# Patient Record
Sex: MALE | Race: WHITE | Employment: OTHER | URBAN - METROPOLITAN AREA
[De-identification: names, ages, dates, MRNs, and addresses within clinical notes are randomized per-mention and may not be internally consistent; named-entity substitution may affect disease eponyms.]

---

## 2017-03-05 PROBLEM — R06.09 EXERTIONAL DYSPNEA: Status: ACTIVE | Noted: 2017-03-05

## 2017-03-05 PROBLEM — G47.33 OBSTRUCTIVE SLEEP APNEA: Status: ACTIVE | Noted: 2017-03-05

## 2017-03-05 PROBLEM — E66.01 MORBID OBESITY DUE TO EXCESS CALORIES (HCC): Status: ACTIVE | Noted: 2017-03-05

## 2017-03-06 PROBLEM — E78.00 PURE HYPERCHOLESTEROLEMIA: Status: ACTIVE | Noted: 2017-03-06

## 2017-03-06 PROBLEM — N18.2 CHRONIC KIDNEY DISEASE, STAGE II (MILD): Status: ACTIVE | Noted: 2017-03-06

## 2017-03-08 LAB
LEFT VENTRICULAR EJECTION FRACTION HIGH VALUE: 60 %
LEFT VENTRICULAR EJECTION FRACTION MODE: NORMAL
LV EF: 55 %

## 2017-05-04 PROBLEM — M79.602 LEFT ARM PAIN: Status: ACTIVE | Noted: 2017-05-04

## 2017-05-05 PROBLEM — I10 ACCELERATED HYPERTENSION: Status: ACTIVE | Noted: 2017-05-05

## 2018-03-12 ENCOUNTER — TELEPHONE (OUTPATIENT)
Dept: CARDIOLOGY CLINIC | Age: 53
End: 2018-03-12

## 2018-03-12 NOTE — TELEPHONE ENCOUNTER
Yuniel Daisy 8/09/2095 [facility] [phone] (home)    Called and cancelled tomorrows initial visit. Has to take his mother to an appt that she can't miss. Cant come next Tuesday.   Agreeable to Friday 3/23 @ 2\"30pm

## 2018-03-19 DIAGNOSIS — R06.09 EXERTIONAL DYSPNEA: Primary | ICD-10-CM

## 2018-03-23 ENCOUNTER — OFFICE VISIT (OUTPATIENT)
Dept: CARDIOLOGY CLINIC | Age: 53
End: 2018-03-23

## 2018-03-23 VITALS
HEIGHT: 72 IN | WEIGHT: 315 LBS | DIASTOLIC BLOOD PRESSURE: 108 MMHG | OXYGEN SATURATION: 93 % | BODY MASS INDEX: 42.66 KG/M2 | HEART RATE: 78 BPM | SYSTOLIC BLOOD PRESSURE: 170 MMHG | RESPIRATION RATE: 20 BRPM

## 2018-03-23 DIAGNOSIS — I50.30 (HFPEF) HEART FAILURE WITH PRESERVED EJECTION FRACTION (HCC): Primary | ICD-10-CM

## 2018-03-23 DIAGNOSIS — J98.4 RESTRICTIVE LUNG DISEASE: ICD-10-CM

## 2018-03-23 DIAGNOSIS — R06.02 SOB (SHORTNESS OF BREATH): ICD-10-CM

## 2018-03-23 DIAGNOSIS — G47.33 OSA (OBSTRUCTIVE SLEEP APNEA): ICD-10-CM

## 2018-03-23 DIAGNOSIS — I10 ESSENTIAL HYPERTENSION: ICD-10-CM

## 2018-03-23 DIAGNOSIS — E66.9 OBESITY (BMI 35.0-39.9 WITHOUT COMORBIDITY): ICD-10-CM

## 2018-03-23 PROCEDURE — 99205 OFFICE O/P NEW HI 60 MIN: CPT | Performed by: INTERNAL MEDICINE

## 2018-03-23 PROCEDURE — 93000 ELECTROCARDIOGRAM COMPLETE: CPT | Performed by: INTERNAL MEDICINE

## 2018-03-23 RX ORDER — AMITRIPTYLINE HYDROCHLORIDE 25 MG/1
25 TABLET, FILM COATED ORAL NIGHTLY PRN
Qty: 90 TABLET | Refills: 3 | Status: SHIPPED | OUTPATIENT
Start: 2018-03-23 | End: 2018-09-17

## 2018-03-23 RX ORDER — POTASSIUM CHLORIDE 20 MEQ/1
20 TABLET, EXTENDED RELEASE ORAL DAILY
Qty: 60 TABLET | Refills: 3 | Status: SHIPPED | OUTPATIENT
Start: 2018-03-23 | End: 2018-07-26 | Stop reason: ALTCHOICE

## 2018-03-23 RX ORDER — NICOTINE 21 MG/24HR
1 PATCH, TRANSDERMAL 24 HOURS TRANSDERMAL EVERY 24 HOURS
Qty: 60 PATCH | Refills: 3 | Status: SHIPPED | OUTPATIENT
Start: 2018-03-23 | End: 2018-07-10

## 2018-03-23 RX ORDER — BUMETANIDE 2 MG/1
2 TABLET ORAL DAILY
Qty: 60 TABLET | Refills: 3 | Status: SHIPPED | OUTPATIENT
Start: 2018-03-23 | End: 2018-07-26 | Stop reason: ALTCHOICE

## 2018-03-23 RX ORDER — LOSARTAN POTASSIUM 25 MG/1
25 TABLET ORAL DAILY
Qty: 30 TABLET | Refills: 3 | Status: SHIPPED | OUTPATIENT
Start: 2018-03-23 | End: 2018-04-23 | Stop reason: SDUPTHER

## 2018-04-04 ENCOUNTER — HOSPITAL ENCOUNTER (OUTPATIENT)
Age: 53
Discharge: HOME | End: 2018-04-04
Payer: MEDICAID

## 2018-04-04 ENCOUNTER — OFFICE VISIT (OUTPATIENT)
Dept: CARDIOLOGY CLINIC | Age: 53
End: 2018-04-04
Payer: MEDICAID

## 2018-04-04 VITALS
OXYGEN SATURATION: 95 % | HEART RATE: 94 BPM | WEIGHT: 315 LBS | RESPIRATION RATE: 20 BRPM | BODY MASS INDEX: 42.66 KG/M2 | HEIGHT: 72 IN | DIASTOLIC BLOOD PRESSURE: 80 MMHG | SYSTOLIC BLOOD PRESSURE: 138 MMHG

## 2018-04-04 DIAGNOSIS — G47.33 OSA (OBSTRUCTIVE SLEEP APNEA): ICD-10-CM

## 2018-04-04 DIAGNOSIS — I50.30 (HFPEF) HEART FAILURE WITH PRESERVED EJECTION FRACTION (HCC): ICD-10-CM

## 2018-04-04 DIAGNOSIS — I10 ESSENTIAL HYPERTENSION: ICD-10-CM

## 2018-04-04 DIAGNOSIS — E66.9 OBESITY (BMI 35.0-39.9 WITHOUT COMORBIDITY): ICD-10-CM

## 2018-04-04 DIAGNOSIS — J98.4 RESTRICTIVE LUNG DISEASE: ICD-10-CM

## 2018-04-04 DIAGNOSIS — I25.118 ATHEROSCLEROSIS OF NATIVE CORONARY ARTERY OF NATIVE HEART WITH STABLE ANGINA PECTORIS (HCC): Primary | ICD-10-CM

## 2018-04-04 DIAGNOSIS — R06.02 SOB (SHORTNESS OF BREATH): ICD-10-CM

## 2018-04-04 LAB — PRO-BNP: 245 PG/ML (ref 0–125)

## 2018-04-04 PROCEDURE — G8598 ASA/ANTIPLAT THER USED: HCPCS | Performed by: INTERNAL MEDICINE

## 2018-04-04 PROCEDURE — 99214 OFFICE O/P EST MOD 30 MIN: CPT | Performed by: INTERNAL MEDICINE

## 2018-04-04 PROCEDURE — 4004F PT TOBACCO SCREEN RCVD TLK: CPT | Performed by: INTERNAL MEDICINE

## 2018-04-04 PROCEDURE — G8427 DOCREV CUR MEDS BY ELIG CLIN: HCPCS | Performed by: INTERNAL MEDICINE

## 2018-04-04 PROCEDURE — 3017F COLORECTAL CA SCREEN DOC REV: CPT | Performed by: INTERNAL MEDICINE

## 2018-04-04 PROCEDURE — G8417 CALC BMI ABV UP PARAM F/U: HCPCS | Performed by: INTERNAL MEDICINE

## 2018-04-04 PROCEDURE — 36415 COLL VENOUS BLD VENIPUNCTURE: CPT

## 2018-04-04 PROCEDURE — 93000 ELECTROCARDIOGRAM COMPLETE: CPT | Performed by: INTERNAL MEDICINE

## 2018-04-04 PROCEDURE — 83880 ASSAY OF NATRIURETIC PEPTIDE: CPT

## 2018-04-06 ENCOUNTER — OFFICE VISIT (OUTPATIENT)
Dept: PULMONOLOGY | Age: 53
End: 2018-04-06
Payer: MEDICAID

## 2018-04-06 VITALS
SYSTOLIC BLOOD PRESSURE: 180 MMHG | DIASTOLIC BLOOD PRESSURE: 122 MMHG | WEIGHT: 315 LBS | RESPIRATION RATE: 20 BRPM | BODY MASS INDEX: 42.66 KG/M2 | HEIGHT: 72 IN | OXYGEN SATURATION: 96 %

## 2018-04-06 DIAGNOSIS — G47.33 OBSTRUCTIVE SLEEP APNEA: ICD-10-CM

## 2018-04-06 PROCEDURE — 99213 OFFICE O/P EST LOW 20 MIN: CPT | Performed by: INTERNAL MEDICINE

## 2018-04-06 PROCEDURE — G8427 DOCREV CUR MEDS BY ELIG CLIN: HCPCS | Performed by: INTERNAL MEDICINE

## 2018-04-06 PROCEDURE — 3017F COLORECTAL CA SCREEN DOC REV: CPT | Performed by: INTERNAL MEDICINE

## 2018-04-06 PROCEDURE — 99214 OFFICE O/P EST MOD 30 MIN: CPT | Performed by: INTERNAL MEDICINE

## 2018-04-06 PROCEDURE — G8598 ASA/ANTIPLAT THER USED: HCPCS | Performed by: INTERNAL MEDICINE

## 2018-04-06 PROCEDURE — G8417 CALC BMI ABV UP PARAM F/U: HCPCS | Performed by: INTERNAL MEDICINE

## 2018-04-06 PROCEDURE — 4004F PT TOBACCO SCREEN RCVD TLK: CPT | Performed by: INTERNAL MEDICINE

## 2018-04-09 ENCOUNTER — HOSPITAL ENCOUNTER (OUTPATIENT)
Dept: CARDIOLOGY | Age: 53
Discharge: HOME | End: 2018-04-09
Payer: MEDICAID

## 2018-04-09 DIAGNOSIS — E66.9 OBESITY (BMI 35.0-39.9 WITHOUT COMORBIDITY): ICD-10-CM

## 2018-04-09 DIAGNOSIS — G47.33 OSA (OBSTRUCTIVE SLEEP APNEA): ICD-10-CM

## 2018-04-09 DIAGNOSIS — I10 ESSENTIAL HYPERTENSION: ICD-10-CM

## 2018-04-09 DIAGNOSIS — R06.02 SOB (SHORTNESS OF BREATH): ICD-10-CM

## 2018-04-09 DIAGNOSIS — I50.30 (HFPEF) HEART FAILURE WITH PRESERVED EJECTION FRACTION (HCC): ICD-10-CM

## 2018-04-09 DIAGNOSIS — J98.4 RESTRICTIVE LUNG DISEASE: ICD-10-CM

## 2018-04-09 LAB
LV EF: 56 %
LVEF MODALITY: NORMAL

## 2018-04-09 PROCEDURE — 93306 TTE W/DOPPLER COMPLETE: CPT

## 2018-04-12 ENCOUNTER — HOSPITAL ENCOUNTER (OUTPATIENT)
Dept: CARDIAC CATH/INVASIVE PROCEDURES | Age: 53
Discharge: HOME | End: 2018-04-12
Payer: MEDICAID

## 2018-04-12 VITALS
DIASTOLIC BLOOD PRESSURE: 109 MMHG | RESPIRATION RATE: 24 BRPM | TEMPERATURE: 98 F | BODY MASS INDEX: 42.66 KG/M2 | HEIGHT: 72 IN | WEIGHT: 315 LBS | HEART RATE: 86 BPM | SYSTOLIC BLOOD PRESSURE: 176 MMHG

## 2018-04-12 LAB
ANION GAP SERPL CALCULATED.3IONS-SCNC: 17 MMOL/L (ref 7–16)
BASOPHILS ABSOLUTE: 0.04 E9/L (ref 0–0.2)
BASOPHILS RELATIVE PERCENT: 0.4 % (ref 0–2)
BUN BLDV-MCNC: 21 MG/DL (ref 6–20)
CALCIUM SERPL-MCNC: 9.4 MG/DL (ref 8.6–10.2)
CHLORIDE BLD-SCNC: 103 MMOL/L (ref 98–107)
CO2: 24 MMOL/L (ref 22–29)
CREAT SERPL-MCNC: 1.1 MG/DL (ref 0.7–1.2)
EOSINOPHILS ABSOLUTE: 0.17 E9/L (ref 0.05–0.5)
EOSINOPHILS RELATIVE PERCENT: 1.9 % (ref 0–6)
GFR AFRICAN AMERICAN: >60
GFR NON-AFRICAN AMERICAN: >60 ML/MIN/1.73
GLUCOSE BLD-MCNC: 119 MG/DL (ref 74–109)
HCT VFR BLD CALC: 46.5 % (ref 37–54)
HEMOGLOBIN: 15.7 G/DL (ref 12.5–16.5)
IMMATURE GRANULOCYTES #: 0.06 E9/L
IMMATURE GRANULOCYTES %: 0.7 % (ref 0–5)
LYMPHOCYTES ABSOLUTE: 1.82 E9/L (ref 1.5–4)
LYMPHOCYTES RELATIVE PERCENT: 19.8 % (ref 20–42)
MCH RBC QN AUTO: 31.1 PG (ref 26–35)
MCHC RBC AUTO-ENTMCNC: 33.8 % (ref 32–34.5)
MCV RBC AUTO: 92.1 FL (ref 80–99.9)
MONOCYTES ABSOLUTE: 0.79 E9/L (ref 0.1–0.95)
MONOCYTES RELATIVE PERCENT: 8.6 % (ref 2–12)
NEUTROPHILS ABSOLUTE: 6.3 E9/L (ref 1.8–7.3)
NEUTROPHILS RELATIVE PERCENT: 68.6 % (ref 43–80)
PDW BLD-RTO: 13.7 FL (ref 11.5–15)
PLATELET # BLD: 221 E9/L (ref 130–450)
PMV BLD AUTO: 9.1 FL (ref 7–12)
POTASSIUM SERPL-SCNC: 4.4 MMOL/L (ref 3.5–5)
RBC # BLD: 5.05 E12/L (ref 3.8–5.8)
SODIUM BLD-SCNC: 144 MMOL/L (ref 132–146)
WBC # BLD: 9.2 E9/L (ref 4.5–11.5)

## 2018-04-12 PROCEDURE — 80048 BASIC METABOLIC PNL TOTAL CA: CPT

## 2018-04-12 PROCEDURE — 2500000003 HC RX 250 WO HCPCS

## 2018-04-12 PROCEDURE — 2709999900 HC NON-CHARGEABLE SUPPLY

## 2018-04-12 PROCEDURE — C1751 CATH, INF, PER/CENT/MIDLINE: HCPCS

## 2018-04-12 PROCEDURE — C1894 INTRO/SHEATH, NON-LASER: HCPCS

## 2018-04-12 PROCEDURE — 93451 RIGHT HEART CATH: CPT | Performed by: INTERNAL MEDICINE

## 2018-04-12 PROCEDURE — 6360000002 HC RX W HCPCS

## 2018-04-12 PROCEDURE — 36415 COLL VENOUS BLD VENIPUNCTURE: CPT

## 2018-04-12 PROCEDURE — 85025 COMPLETE CBC W/AUTO DIFF WBC: CPT

## 2018-04-23 ENCOUNTER — OFFICE VISIT (OUTPATIENT)
Dept: CARDIOLOGY CLINIC | Age: 53
End: 2018-04-23
Payer: MEDICAID

## 2018-04-23 VITALS
OXYGEN SATURATION: 98 % | WEIGHT: 315 LBS | BODY MASS INDEX: 42.66 KG/M2 | RESPIRATION RATE: 24 BRPM | DIASTOLIC BLOOD PRESSURE: 88 MMHG | HEART RATE: 86 BPM | SYSTOLIC BLOOD PRESSURE: 150 MMHG | HEIGHT: 72 IN

## 2018-04-23 DIAGNOSIS — I50.30 (HFPEF) HEART FAILURE WITH PRESERVED EJECTION FRACTION (HCC): ICD-10-CM

## 2018-04-23 DIAGNOSIS — R20.0 ARM NUMBNESS: ICD-10-CM

## 2018-04-23 DIAGNOSIS — R07.9 CHEST PAIN, UNSPECIFIED TYPE: Primary | ICD-10-CM

## 2018-04-23 DIAGNOSIS — R42 DIZZINESS: ICD-10-CM

## 2018-04-23 DIAGNOSIS — R06.00 DYSPNEA, UNSPECIFIED TYPE: ICD-10-CM

## 2018-04-23 DIAGNOSIS — R26.89 IMBALANCE: ICD-10-CM

## 2018-04-23 DIAGNOSIS — R27.0 ATAXIA: ICD-10-CM

## 2018-04-23 PROCEDURE — G8417 CALC BMI ABV UP PARAM F/U: HCPCS | Performed by: INTERNAL MEDICINE

## 2018-04-23 PROCEDURE — 4004F PT TOBACCO SCREEN RCVD TLK: CPT | Performed by: INTERNAL MEDICINE

## 2018-04-23 PROCEDURE — 93000 ELECTROCARDIOGRAM COMPLETE: CPT | Performed by: INTERNAL MEDICINE

## 2018-04-23 PROCEDURE — 99215 OFFICE O/P EST HI 40 MIN: CPT | Performed by: INTERNAL MEDICINE

## 2018-04-23 PROCEDURE — 3017F COLORECTAL CA SCREEN DOC REV: CPT | Performed by: INTERNAL MEDICINE

## 2018-04-23 PROCEDURE — 99406 BEHAV CHNG SMOKING 3-10 MIN: CPT | Performed by: INTERNAL MEDICINE

## 2018-04-23 PROCEDURE — G8598 ASA/ANTIPLAT THER USED: HCPCS | Performed by: INTERNAL MEDICINE

## 2018-04-23 PROCEDURE — G8427 DOCREV CUR MEDS BY ELIG CLIN: HCPCS | Performed by: INTERNAL MEDICINE

## 2018-04-23 RX ORDER — LOSARTAN POTASSIUM 50 MG/1
50 TABLET ORAL NIGHTLY
Qty: 90 TABLET | Refills: 3 | Status: SHIPPED | OUTPATIENT
Start: 2018-04-23 | End: 2018-07-10 | Stop reason: SDUPTHER

## 2018-05-10 ENCOUNTER — TELEPHONE (OUTPATIENT)
Dept: CARDIOLOGY CLINIC | Age: 53
End: 2018-05-10

## 2018-06-25 ENCOUNTER — APPOINTMENT (OUTPATIENT)
Dept: GENERAL RADIOLOGY | Age: 53
End: 2018-06-25
Payer: MEDICAID

## 2018-06-25 ENCOUNTER — APPOINTMENT (OUTPATIENT)
Dept: CT IMAGING | Age: 53
End: 2018-06-25
Payer: MEDICAID

## 2018-06-25 ENCOUNTER — HOSPITAL ENCOUNTER (OUTPATIENT)
Age: 53
Setting detail: OBSERVATION
Discharge: HOME | End: 2018-06-28
Attending: EMERGENCY MEDICINE | Admitting: INTERNAL MEDICINE
Payer: MEDICAID

## 2018-06-25 DIAGNOSIS — I15.0 RENOVASCULAR HYPERTENSION: ICD-10-CM

## 2018-06-25 DIAGNOSIS — R42 LIGHTHEADED: ICD-10-CM

## 2018-06-25 DIAGNOSIS — G45.9 TRANSIENT CEREBRAL ISCHEMIA, UNSPECIFIED TYPE: Primary | ICD-10-CM

## 2018-06-25 DIAGNOSIS — I10 HYPERTENSION, UNSPECIFIED TYPE: ICD-10-CM

## 2018-06-25 LAB
ANION GAP SERPL CALCULATED.3IONS-SCNC: 13 MMOL/L (ref 7–16)
BUN BLDV-MCNC: 14 MG/DL (ref 6–20)
CALCIUM SERPL-MCNC: 9.1 MG/DL (ref 8.6–10.2)
CHLORIDE BLD-SCNC: 103 MMOL/L (ref 98–107)
CO2: 24 MMOL/L (ref 22–29)
CREAT SERPL-MCNC: 1 MG/DL (ref 0.7–1.2)
GFR AFRICAN AMERICAN: >60
GFR NON-AFRICAN AMERICAN: >60 ML/MIN/1.73
GLUCOSE BLD-MCNC: 156 MG/DL (ref 74–109)
HCT VFR BLD CALC: 45 % (ref 37–54)
HEMOGLOBIN: 15.3 G/DL (ref 12.5–16.5)
MCH RBC QN AUTO: 31.7 PG (ref 26–35)
MCHC RBC AUTO-ENTMCNC: 34 % (ref 32–34.5)
MCV RBC AUTO: 93.4 FL (ref 80–99.9)
PDW BLD-RTO: 13.3 FL (ref 11.5–15)
PLATELET # BLD: 178 E9/L (ref 130–450)
PMV BLD AUTO: 9.2 FL (ref 7–12)
POTASSIUM SERPL-SCNC: 3.8 MMOL/L (ref 3.5–5)
PRO-BNP: 281 PG/ML (ref 0–125)
RBC # BLD: 4.82 E12/L (ref 3.8–5.8)
SODIUM BLD-SCNC: 140 MMOL/L (ref 132–146)
TROPONIN: <0.01 NG/ML (ref 0–0.03)
WBC # BLD: 6.7 E9/L (ref 4.5–11.5)

## 2018-06-25 PROCEDURE — 85027 COMPLETE CBC AUTOMATED: CPT

## 2018-06-25 PROCEDURE — 36415 COLL VENOUS BLD VENIPUNCTURE: CPT

## 2018-06-25 PROCEDURE — 83880 ASSAY OF NATRIURETIC PEPTIDE: CPT

## 2018-06-25 PROCEDURE — 70450 CT HEAD/BRAIN W/O DYE: CPT

## 2018-06-25 PROCEDURE — 96374 THER/PROPH/DIAG INJ IV PUSH: CPT

## 2018-06-25 PROCEDURE — 80048 BASIC METABOLIC PNL TOTAL CA: CPT

## 2018-06-25 PROCEDURE — 2500000003 HC RX 250 WO HCPCS: Performed by: EMERGENCY MEDICINE

## 2018-06-25 PROCEDURE — 96375 TX/PRO/DX INJ NEW DRUG ADDON: CPT

## 2018-06-25 PROCEDURE — 71046 X-RAY EXAM CHEST 2 VIEWS: CPT

## 2018-06-25 PROCEDURE — 6360000002 HC RX W HCPCS: Performed by: EMERGENCY MEDICINE

## 2018-06-25 PROCEDURE — 99285 EMERGENCY DEPT VISIT HI MDM: CPT

## 2018-06-25 PROCEDURE — 84484 ASSAY OF TROPONIN QUANT: CPT

## 2018-06-25 PROCEDURE — G0378 HOSPITAL OBSERVATION PER HR: HCPCS

## 2018-06-25 RX ORDER — HYDRALAZINE HYDROCHLORIDE 20 MG/ML
10 INJECTION INTRAMUSCULAR; INTRAVENOUS ONCE
Status: COMPLETED | OUTPATIENT
Start: 2018-06-25 | End: 2018-06-25

## 2018-06-25 RX ORDER — MORPHINE SULFATE 4 MG/ML
4 INJECTION, SOLUTION INTRAMUSCULAR; INTRAVENOUS ONCE
Status: COMPLETED | OUTPATIENT
Start: 2018-06-25 | End: 2018-06-25

## 2018-06-25 RX ORDER — ONDANSETRON 2 MG/ML
4 INJECTION INTRAMUSCULAR; INTRAVENOUS ONCE
Status: COMPLETED | OUTPATIENT
Start: 2018-06-25 | End: 2018-06-25

## 2018-06-25 RX ORDER — LABETALOL HYDROCHLORIDE 5 MG/ML
20 INJECTION, SOLUTION INTRAVENOUS ONCE
Status: COMPLETED | OUTPATIENT
Start: 2018-06-25 | End: 2018-06-25

## 2018-06-25 RX ADMIN — MORPHINE SULFATE 4 MG: 4 INJECTION INTRAVENOUS at 20:11

## 2018-06-25 RX ADMIN — LABETALOL HYDROCHLORIDE 20 MG: 5 INJECTION INTRAVENOUS at 18:05

## 2018-06-25 RX ADMIN — ONDANSETRON 4 MG: 2 INJECTION INTRAMUSCULAR; INTRAVENOUS at 20:11

## 2018-06-25 RX ADMIN — HYDRALAZINE HYDROCHLORIDE 10 MG: 20 INJECTION INTRAMUSCULAR; INTRAVENOUS at 22:24

## 2018-06-25 ASSESSMENT — PAIN SCALES - GENERAL
PAINLEVEL_OUTOF10: 10
PAINLEVEL_OUTOF10: 6

## 2018-06-25 ASSESSMENT — PAIN DESCRIPTION - LOCATION: LOCATION: BACK;HEAD

## 2018-06-25 ASSESSMENT — PAIN DESCRIPTION - PAIN TYPE: TYPE: CHRONIC PAIN;ACUTE PAIN

## 2018-06-25 NOTE — ED PROVIDER NOTES
Heart Disease in his mother; No Known Problems in his brother, brother, sister, and sister; Other in his father. The patients home medications have been reviewed.     Allergies: Pcn [penicillins]    -------------------------------------------------- RESULTS -------------------------------------------------  All laboratory and radiology results have been personally reviewed by myself   LABS:  Results for orders placed or performed during the hospital encounter of 66/24/61   Basic metabolic panel   Result Value Ref Range    Sodium 140 132 - 146 mmol/L    Potassium 3.8 3.5 - 5.0 mmol/L    Chloride 103 98 - 107 mmol/L    CO2 24 22 - 29 mmol/L    Anion Gap 13 7 - 16 mmol/L    Glucose 156 (H) 74 - 109 mg/dL    BUN 14 6 - 20 mg/dL    CREATININE 1.0 0.7 - 1.2 mg/dL    GFR Non-African American >60 >=60 mL/min/1.73    GFR African American >60     Calcium 9.1 8.6 - 10.2 mg/dL   CBC   Result Value Ref Range    WBC 6.7 4.5 - 11.5 E9/L    RBC 4.82 3.80 - 5.80 E12/L    Hemoglobin 15.3 12.5 - 16.5 g/dL    Hematocrit 45.0 37.0 - 54.0 %    MCV 93.4 80.0 - 99.9 fL    MCH 31.7 26.0 - 35.0 pg    MCHC 34.0 32.0 - 34.5 %    RDW 13.3 11.5 - 15.0 fL    Platelets 005 000 - 390 E9/L    MPV 9.2 7.0 - 12.0 fL   Troponin   Result Value Ref Range    Troponin <0.01 0.00 - 0.03 ng/mL   Brain Natriuretic Peptide   Result Value Ref Range    Pro- (H) 0 - 125 pg/mL   Hemoglobin A1c   Result Value Ref Range    Hemoglobin A1C 6.1 (H) 4.0 - 5.6 %   CBC auto differential   Result Value Ref Range    WBC 7.8 4.5 - 11.5 E9/L    RBC 4.93 3.80 - 5.80 E12/L    Hemoglobin 15.5 12.5 - 16.5 g/dL    Hematocrit 45.2 37.0 - 54.0 %    MCV 91.7 80.0 - 99.9 fL    MCH 31.4 26.0 - 35.0 pg    MCHC 34.3 32.0 - 34.5 %    RDW 13.4 11.5 - 15.0 fL    Platelets 773 831 - 074 E9/L    MPV 9.2 7.0 - 12.0 fL    Neutrophils % 65.9 43.0 - 80.0 %    Immature Granulocytes % 0.9 0.0 - 5.0 %    Lymphocytes % 19.4 (L) 20.0 - 42.0 %    Monocytes % 10.5 2.0 - 12.0 %    Eosinophils results, in addition to providing specific details for the plan of care and counseling regarding the diagnosis and prognosis. Questions are answered at this time and they are agreeable with the plan.      --------------------------------- IMPRESSION AND DISPOSITION ---------------------------------    IMPRESSION  1. Transient cerebral ischemia, unspecified type    2. Hypertension, unspecified type    3. Lightheaded    4. Renovascular hypertension        DISPOSITION  Disposition: Admit to telemetry  Patient condition is stable      NOTE: This report was transcribed using voice recognition software.  Every effort was made to ensure accuracy; however, inadvertent computerized transcription errors may be present        Pablo Goldman MD  07/01/18 2522

## 2018-06-25 NOTE — ED NOTES
FIRST PROVIDER CONTACT ASSESSMENT NOTE      Department of Emergency Medicine   6/25/18  3:00 PM    Chief Complaint: Shortness of Breath (more so than usual since this a.m.; -cough +dizziness, numbness to b/l hands) and Headache      History of Present Illness:    Leroy Espinoza is a 48 y.o. male who presents to the ED by private car for  Sob   Focused Screening Exam:  Constitutional:  Alert, appears stated age and is in no distress.   Heart rrr  Lungs Diminished     *ALLERGIES*     Pcn [penicillins]     ED Triage Vitals [06/25/18 1458]   BP Temp Temp Source Pulse Resp SpO2 Height Weight   (!) 166/107 98.4 °F (36.9 °C) Oral 76 20 95 % 6' (1.829 m) (!) 350 lb (158.8 kg)        Initial Plan of Care:  Initiate Treatment-Testing, Proceed toTreatment Area When Bed Available for ED Attending/MLP to Continue Care    -----------------END OF FIRST PROVIDER CONTACT ASSESSMENT NOTE--------------  Electronically signed by JESSE Manley   DD: 6/25/18     JESSE Manley  06/25/18 1909

## 2018-06-26 LAB — HBA1C MFR BLD: 6.1 % (ref 4–5.6)

## 2018-06-26 PROCEDURE — 6370000000 HC RX 637 (ALT 250 FOR IP): Performed by: INTERNAL MEDICINE

## 2018-06-26 PROCEDURE — 2580000003 HC RX 258: Performed by: INTERNAL MEDICINE

## 2018-06-26 PROCEDURE — 83036 HEMOGLOBIN GLYCOSYLATED A1C: CPT

## 2018-06-26 PROCEDURE — G0378 HOSPITAL OBSERVATION PER HR: HCPCS

## 2018-06-26 PROCEDURE — 6360000002 HC RX W HCPCS: Performed by: INTERNAL MEDICINE

## 2018-06-26 PROCEDURE — 96372 THER/PROPH/DIAG INJ SC/IM: CPT

## 2018-06-26 PROCEDURE — 36415 COLL VENOUS BLD VENIPUNCTURE: CPT

## 2018-06-26 PROCEDURE — 99220 PR INITIAL OBSERVATION CARE/DAY 70 MINUTES: CPT | Performed by: PSYCHIATRY & NEUROLOGY

## 2018-06-26 RX ORDER — AMITRIPTYLINE HYDROCHLORIDE 25 MG/1
25 TABLET, FILM COATED ORAL NIGHTLY PRN
Status: DISCONTINUED | OUTPATIENT
Start: 2018-06-26 | End: 2018-06-28 | Stop reason: HOSPADM

## 2018-06-26 RX ORDER — LOSARTAN POTASSIUM 50 MG/1
50 TABLET ORAL NIGHTLY
Status: DISCONTINUED | OUTPATIENT
Start: 2018-06-26 | End: 2018-06-28 | Stop reason: HOSPADM

## 2018-06-26 RX ORDER — ONDANSETRON 2 MG/ML
4 INJECTION INTRAMUSCULAR; INTRAVENOUS EVERY 6 HOURS PRN
Status: DISCONTINUED | OUTPATIENT
Start: 2018-06-26 | End: 2018-06-28 | Stop reason: HOSPADM

## 2018-06-26 RX ORDER — HYDRALAZINE HYDROCHLORIDE 20 MG/ML
10 INJECTION INTRAMUSCULAR; INTRAVENOUS EVERY 6 HOURS PRN
Status: DISCONTINUED | OUTPATIENT
Start: 2018-06-26 | End: 2018-06-28 | Stop reason: HOSPADM

## 2018-06-26 RX ORDER — SODIUM CHLORIDE 0.9 % (FLUSH) 0.9 %
10 SYRINGE (ML) INJECTION PRN
Status: DISCONTINUED | OUTPATIENT
Start: 2018-06-26 | End: 2018-06-28 | Stop reason: HOSPADM

## 2018-06-26 RX ORDER — ACETAMINOPHEN 325 MG/1
650 TABLET ORAL EVERY 4 HOURS PRN
Status: DISCONTINUED | OUTPATIENT
Start: 2018-06-26 | End: 2018-06-28 | Stop reason: HOSPADM

## 2018-06-26 RX ORDER — POTASSIUM CHLORIDE 20 MEQ/1
20 TABLET, EXTENDED RELEASE ORAL DAILY
Status: DISCONTINUED | OUTPATIENT
Start: 2018-06-26 | End: 2018-06-28 | Stop reason: HOSPADM

## 2018-06-26 RX ORDER — SODIUM CHLORIDE 0.9 % (FLUSH) 0.9 %
10 SYRINGE (ML) INJECTION EVERY 12 HOURS SCHEDULED
Status: DISCONTINUED | OUTPATIENT
Start: 2018-06-26 | End: 2018-06-28 | Stop reason: HOSPADM

## 2018-06-26 RX ORDER — BUMETANIDE 1 MG/1
2 TABLET ORAL DAILY
Status: DISCONTINUED | OUTPATIENT
Start: 2018-06-26 | End: 2018-06-28 | Stop reason: HOSPADM

## 2018-06-26 RX ORDER — ASPIRIN 81 MG/1
81 TABLET, CHEWABLE ORAL DAILY
Status: DISCONTINUED | OUTPATIENT
Start: 2018-06-26 | End: 2018-06-28 | Stop reason: HOSPADM

## 2018-06-26 RX ADMIN — BECLOMETHASONE DIPROPIONATE 1 PUFF: 40 AEROSOL, METERED RESPIRATORY (INHALATION) at 20:49

## 2018-06-26 RX ADMIN — BECLOMETHASONE DIPROPIONATE 1 PUFF: 40 AEROSOL, METERED RESPIRATORY (INHALATION) at 08:00

## 2018-06-26 RX ADMIN — POTASSIUM CHLORIDE 20 MEQ: 1500 TABLET, EXTENDED RELEASE ORAL at 08:01

## 2018-06-26 RX ADMIN — Medication 10 ML: at 08:01

## 2018-06-26 RX ADMIN — ENOXAPARIN SODIUM 40 MG: 40 INJECTION SUBCUTANEOUS at 08:01

## 2018-06-26 RX ADMIN — ACETAMINOPHEN 650 MG: 325 TABLET, FILM COATED ORAL at 16:32

## 2018-06-26 RX ADMIN — Medication 10 ML: at 22:20

## 2018-06-26 RX ADMIN — LOSARTAN POTASSIUM 50 MG: 50 TABLET, FILM COATED ORAL at 20:48

## 2018-06-26 RX ADMIN — METOPROLOL TARTRATE 12.5 MG: 25 TABLET ORAL at 20:48

## 2018-06-26 RX ADMIN — HYDRALAZINE HYDROCHLORIDE 10 MG: 20 INJECTION INTRAMUSCULAR; INTRAVENOUS at 12:16

## 2018-06-26 RX ADMIN — ASPIRIN 81 MG CHEWABLE TABLET 81 MG: 81 TABLET CHEWABLE at 08:01

## 2018-06-26 RX ADMIN — BUMETANIDE 2 MG: 1 TABLET ORAL at 08:01

## 2018-06-26 ASSESSMENT — PAIN DESCRIPTION - LOCATION
LOCATION: NECK;LEG
LOCATION: NECK

## 2018-06-26 ASSESSMENT — PAIN DESCRIPTION - ONSET: ONSET: ON-GOING

## 2018-06-26 ASSESSMENT — PAIN SCALES - GENERAL
PAINLEVEL_OUTOF10: 0
PAINLEVEL_OUTOF10: 7

## 2018-06-26 ASSESSMENT — PAIN DESCRIPTION - FREQUENCY: FREQUENCY: INTERMITTENT

## 2018-06-26 ASSESSMENT — PAIN DESCRIPTION - PAIN TYPE
TYPE: CHRONIC PAIN
TYPE: OTHER (COMMENT)

## 2018-06-26 NOTE — CONSULTS
aspirin  81 mg Oral Daily    bumetanide  2 mg Oral Daily    losartan  50 mg Oral Nightly    potassium chloride  20 mEq Oral Daily    beclomethasone  1 puff Inhalation BID    sodium chloride flush  10 mL Intravenous 2 times per day    enoxaparin  40 mg Subcutaneous Daily           Meds prn:     hydrALAZINE, amitriptyline, sodium chloride flush, ondansetron, acetaminophen    Meds prior to admission:     No current facility-administered medications on file prior to encounter. Current Outpatient Prescriptions on File Prior to Encounter   Medication Sig Dispense Refill    losartan (COZAAR) 50 MG tablet Take 1 tablet by mouth nightly 90 tablet 3    QVAR 40 MCG/ACT inhaler Inhale 1 puff into the lungs 2 times daily      amitriptyline (ELAVIL) 25 MG tablet Take 1 tablet by mouth nightly as needed for Sleep 90 tablet 3    bumetanide (BUMEX) 2 MG tablet Take 1 tablet by mouth daily 60 tablet 3    potassium chloride (KLOR-CON M) 20 MEQ extended release tablet Take 1 tablet by mouth daily 60 tablet 3    nicotine (NICODERM CQ) 14 MG/24HR Place 1 patch onto the skin every 24 hours 60 patch 3    aspirin 81 MG tablet Take 81 mg by mouth daily Prescribed per PCP; contact DR Andi Cantu re: preop instructions         Allergies:    Pcn [penicillins]    Social History:     reports that he has been smoking Cigarettes. He has a 6.25 pack-year smoking history. He has never used smokeless tobacco. He reports that he drinks about 3.0 oz of alcohol per week . He reports that he uses drugs, including Marijuana, about 2 times per week. Family History:     family history includes Heart Disease in his mother; No Known Problems in his brother, brother, sister, and sister; Other in his father.     ROS:     General: no fever, chills   Heent: no nasal congestion, sore throat   Resp: no cough, sob   Cardiac: no cp   Gi: no nausea, vomiting, melena, abd pain, nausea  Gu: no hematuria, dysuria   Neruo: dysarthria  Endocrine:  No h/o dm  Derm: no rash   Heme: no epistaxis, bruising  All other sx negative     Physical Exam:    Patient Vitals for the past 24 hrs:   BP Temp Temp src Pulse Resp SpO2 Weight   06/26/18 1345 (!) 138/92 - - - - - -   06/26/18 1215 (!) 162/100 97.9 °F (36.6 °C) Oral 70 18 - -   06/26/18 0745 128/81 97.5 °F (36.4 °C) - 67 18 96 % -   06/26/18 0650 - - - - - - (!) 347 lb 14.4 oz (157.8 kg)   06/26/18 0435 130/82 98.2 °F (36.8 °C) Temporal 68 18 97 % -   06/25/18 2355 (!) 140/90 98 °F (36.7 °C) Temporal 66 - 97 % -   06/25/18 2239 (!) 154/94 - - 68 18 98 % -   06/25/18 2205 (!) 188/114 - Oral 68 18 98 % -   06/25/18 1809 (!) 174/88 - - 69 18 96 % -         Intake/Output Summary (Last 24 hours) at 06/26/18 1550  Last data filed at 06/26/18 1352   Gross per 24 hour   Intake              840 ml   Output             1850 ml   Net            -1010 ml       Constitutional: Patient in no acute distress   Head: normocephalic, atraumatic   Neck: supple, no jvd  Cardiovascular: regular rate and rhythm, no murmurs, gallops, or rubs   Respiratory: Clear, no rales, rhochi, or wheezes,   Gastrointestinal: soft, nontender, nondistended, no hepatosplenomegaly  Ext: no edema  Neuro: aaox3  Skin: dry, no rash   Back: nontender    Data:    Recent Labs      06/25/18   1515   WBC  6.7   HGB  15.3   HCT  45.0   MCV  93.4   PLT  178       Recent Labs      06/25/18   1515   NA  140   K  3.8   CL  103   CO2  24   CREATININE  1.0   BUN  14   LABGLOM  >60   GLUCOSE  156*   CALCIUM  9.1       No results for input(s): ALT, AST, ALKPHOS, BILITOT, BILIDIR in the last 72 hours. No results found for: FERRITIN, IRON, TIBC    Lab Results   Component Value Date    MG 2.1 05/04/2017       Lab Results   Component Value Date    LABALBU 4.2 08/29/2017    LABALBU 4.4 05/03/2017    LABALBU 4.7 07/16/2012       Assessment and Plan:    1. Htn: not well controlled at this time. Likely multifactorial due to obesity, reilly, tobacco use, high salt diet , and inactivity.

## 2018-06-26 NOTE — PLAN OF CARE
Problem: Falls - Risk of:  Goal: Will remain free from falls  Will remain free from falls   Outcome: Met This Shift    Goal: Absence of physical injury  Absence of physical injury   Outcome: Met This Shift      Problem: Risk for Impaired Skin Integrity  Goal: Tissue integrity - skin and mucous membranes  Structural intactness and normal physiological function of skin and  mucous membranes.    Outcome: Met This Shift

## 2018-06-27 ENCOUNTER — APPOINTMENT (OUTPATIENT)
Dept: ULTRASOUND IMAGING | Age: 53
End: 2018-06-27
Payer: MEDICAID

## 2018-06-27 LAB
BASOPHILS ABSOLUTE: 0.05 E9/L (ref 0–0.2)
BASOPHILS RELATIVE PERCENT: 0.6 % (ref 0–2)
CORTISOL TOTAL: 7.42 MCG/DL (ref 2.68–18.4)
EOSINOPHILS ABSOLUTE: 0.21 E9/L (ref 0.05–0.5)
EOSINOPHILS RELATIVE PERCENT: 2.7 % (ref 0–6)
HCT VFR BLD CALC: 45.2 % (ref 37–54)
HEMOGLOBIN: 15.5 G/DL (ref 12.5–16.5)
IMMATURE GRANULOCYTES #: 0.07 E9/L
IMMATURE GRANULOCYTES %: 0.9 % (ref 0–5)
LYMPHOCYTES ABSOLUTE: 1.52 E9/L (ref 1.5–4)
LYMPHOCYTES RELATIVE PERCENT: 19.4 % (ref 20–42)
MCH RBC QN AUTO: 31.4 PG (ref 26–35)
MCHC RBC AUTO-ENTMCNC: 34.3 % (ref 32–34.5)
MCV RBC AUTO: 91.7 FL (ref 80–99.9)
MONOCYTES ABSOLUTE: 0.82 E9/L (ref 0.1–0.95)
MONOCYTES RELATIVE PERCENT: 10.5 % (ref 2–12)
NEUTROPHILS ABSOLUTE: 5.17 E9/L (ref 1.8–7.3)
NEUTROPHILS RELATIVE PERCENT: 65.9 % (ref 43–80)
PDW BLD-RTO: 13.4 FL (ref 11.5–15)
PLATELET # BLD: 201 E9/L (ref 130–450)
PMV BLD AUTO: 9.2 FL (ref 7–12)
RBC # BLD: 4.93 E12/L (ref 3.8–5.8)
TSH SERPL DL<=0.05 MIU/L-ACNC: 1.17 UIU/ML (ref 0.27–4.2)
WBC # BLD: 7.8 E9/L (ref 4.5–11.5)

## 2018-06-27 PROCEDURE — 6370000000 HC RX 637 (ALT 250 FOR IP): Performed by: INTERNAL MEDICINE

## 2018-06-27 PROCEDURE — 36415 COLL VENOUS BLD VENIPUNCTURE: CPT

## 2018-06-27 PROCEDURE — 2580000003 HC RX 258: Performed by: INTERNAL MEDICINE

## 2018-06-27 PROCEDURE — 96372 THER/PROPH/DIAG INJ SC/IM: CPT

## 2018-06-27 PROCEDURE — 93975 VASCULAR STUDY: CPT

## 2018-06-27 PROCEDURE — G0378 HOSPITAL OBSERVATION PER HR: HCPCS

## 2018-06-27 PROCEDURE — 84443 ASSAY THYROID STIM HORMONE: CPT

## 2018-06-27 PROCEDURE — 6360000002 HC RX W HCPCS: Performed by: INTERNAL MEDICINE

## 2018-06-27 PROCEDURE — 85025 COMPLETE CBC W/AUTO DIFF WBC: CPT

## 2018-06-27 PROCEDURE — 76770 US EXAM ABDO BACK WALL COMP: CPT

## 2018-06-27 PROCEDURE — 82533 TOTAL CORTISOL: CPT

## 2018-06-27 RX ADMIN — Medication 10 ML: at 08:56

## 2018-06-27 RX ADMIN — Medication 10 ML: at 21:31

## 2018-06-27 RX ADMIN — ACETAMINOPHEN 650 MG: 325 TABLET, FILM COATED ORAL at 09:00

## 2018-06-27 RX ADMIN — POTASSIUM CHLORIDE 20 MEQ: 1500 TABLET, EXTENDED RELEASE ORAL at 08:56

## 2018-06-27 RX ADMIN — METOPROLOL TARTRATE 12.5 MG: 25 TABLET ORAL at 21:31

## 2018-06-27 RX ADMIN — BECLOMETHASONE DIPROPIONATE 1 PUFF: 40 AEROSOL, METERED RESPIRATORY (INHALATION) at 21:32

## 2018-06-27 RX ADMIN — LOSARTAN POTASSIUM 50 MG: 50 TABLET, FILM COATED ORAL at 21:31

## 2018-06-27 RX ADMIN — METOPROLOL TARTRATE 12.5 MG: 25 TABLET ORAL at 08:56

## 2018-06-27 RX ADMIN — HYDRALAZINE HYDROCHLORIDE 10 MG: 20 INJECTION INTRAMUSCULAR; INTRAVENOUS at 00:30

## 2018-06-27 RX ADMIN — BUMETANIDE 2 MG: 1 TABLET ORAL at 08:56

## 2018-06-27 RX ADMIN — ASPIRIN 81 MG CHEWABLE TABLET 81 MG: 81 TABLET CHEWABLE at 09:05

## 2018-06-27 RX ADMIN — BECLOMETHASONE DIPROPIONATE 1 PUFF: 40 AEROSOL, METERED RESPIRATORY (INHALATION) at 08:55

## 2018-06-27 RX ADMIN — ENOXAPARIN SODIUM 40 MG: 40 INJECTION SUBCUTANEOUS at 08:55

## 2018-06-27 ASSESSMENT — PAIN SCALES - GENERAL
PAINLEVEL_OUTOF10: 4
PAINLEVEL_OUTOF10: 2

## 2018-06-27 ASSESSMENT — PAIN DESCRIPTION - FREQUENCY: FREQUENCY: INTERMITTENT

## 2018-06-27 ASSESSMENT — PAIN DESCRIPTION - LOCATION: LOCATION: NECK

## 2018-06-27 ASSESSMENT — PAIN DESCRIPTION - ORIENTATION: ORIENTATION: RIGHT;LEFT

## 2018-06-27 ASSESSMENT — PAIN DESCRIPTION - DESCRIPTORS: DESCRIPTORS: PINS AND NEEDLES

## 2018-06-27 ASSESSMENT — PAIN DESCRIPTION - ONSET: ONSET: GRADUAL

## 2018-06-27 ASSESSMENT — PAIN DESCRIPTION - PAIN TYPE: TYPE: CHRONIC PAIN

## 2018-06-27 NOTE — CONSULTS
CT of his head. CMP was  unrevealing with a GFR greater than 60. Blood glucose of 156 with a  hemoglobin A1c of 6.1. CBC was unremarkable. Coagulation studies were  normal.    IMPRESSION:  This individual now presents with only hypernasal dysarthria. This may be a longstanding difficulty due to chronic nasal congestion and  postnasal drip; however, his resolving speech difficulties and visual  abnormalities are likely the result of an hypertensive crisis. He may have  suffered from PRES. This disorder, fortunately, is now resolving. However, he  is at high risk of strokes as well as severe heart disease due to  his hypertension, marked obesity and obstructive sleep apnea. At present, he is stable medically except for his continued high blood  pressures. PLAN:  We will do MRI and the open study on Thursday. We will watch his blood pressure closely and proceed accordingly. I spent 70 minutes with this patient, examining him, reviewing records, and  reviewing his imaging studies. I spent 50% of my time in discussing my  diagnoses and treatment plans with him.         Maida Morales MD    D: 06/26/2018 16:45:26       T: 06/26/2018 20:03:59     [email or web address]  Job#: [id number]     Doc#: [id number]:

## 2018-06-27 NOTE — PROGRESS NOTES
Spoke with MRI tech re: patient needs MRI Brain WO Contrast scheduled for the mobile open MRI tomorrow. Tentatively, there is a 12pm slot for the scan open, so patient can be transported via wheelchair at that time. Transport to be arranged by the MRI department.      MRI Checklist completed 6/27/18

## 2018-06-27 NOTE — PROGRESS NOTES
Nutrition Assessment    Type and Reason for Visit: Initial, Consult, Patient Education    Nutrition Recommendations: Continue current diet    Malnutrition Assessment:  · Malnutrition Status: No malnutrition  · Context: Acute illness or injury  · Findings of the 6 clinical characteristics of malnutrition (Minimum of 2 out of 6 clinical characteristics is required to make the diagnosis of moderate or severe Protein Calorie Malnutrition based on AND/ASPEN Guidelines):  1. Energy Intake-Greater than 75%,  (x2 days)    2. Weight Loss-No significant weight loss    3. Fat Loss-No significant subcutaneous fat loss    4. Muscle Loss-No significant muscle mass loss    5. Fluid Accumulation-No significant fluid accumulation    6.   Strength-Not measured    Nutrition Diagnosis:   · Problem: No nutrition diagnosis at this time    Nutrition Assessment:  · Nutrition-Focused Physical Findings: pt alert, abd WDL, +BS, no edema, -I/O  · Wound Type: None  · Current Nutrition Therapies:  · Oral Diet Orders: Carb Control 5 Carbs/Meal   · Oral Diet intake: %  · Oral Nutrition Supplement (ONS) Orders: None  · Anthropometric Measures:  · Ht: 6' (182.9 cm)   · Current Body Wt: 343 lb (155.6 kg) (standing scale 6/27)  · Admission Body Wt: 347 lb (157.4 kg) (standing scale 6/26)  · Usual Body Wt: 351 lb (159.2 kg) (actual per EMR 4/2018)  · % Weight Change: 1% loss (4#),  x1 day, likely r/t diuretic use  · Ideal Body Wt: 178 lb (80.7 kg), % Ideal Body 193%  · Adjusted Body Wt: 193 lb (87.5 kg), body weight adjusted for Obesity  · BMI Classification: BMI > or equal to 40.0 Obese Class III  · Comparative Standards (Estimated Nutrition Needs):  · Estimated Daily Total Kcal: 1873-3680 (13-15 kcal/kg CBW)  · Estimated Daily Protein (g): 160-185 (2.0-2.3 kcal/kg IBW)    Estimated Intake vs Estimated Needs: Intake Meets Needs    Nutrition Risk Level: Low    Nutrition Interventions:   Continued Inpatient Monitoring, Education Completed,

## 2018-06-27 NOTE — PROGRESS NOTES
seeing  3. Tobacco abuse  4. HFpEF/reilly: continue diuretic and will add bb. Encourage bipap at night    Felicie Model.  Jose Cotton MD

## 2018-06-27 NOTE — CARE COORDINATION
Transition of care: Patient resting on bed. Remains observation level of care. Pt for open MRI tomorrow. Arrangements made per nursing. SW/CM will follow.

## 2018-06-27 NOTE — PLAN OF CARE
Problem: Nutrition  Goal: Optimal nutrition therapy  Outcome: Ongoing  Nutrition Problem: No nutrition diagnosis at this time  Intervention: Food and/or Nutrient Delivery: Continue current diet  Nutritional Goals: intake >75% meals

## 2018-06-28 VITALS
SYSTOLIC BLOOD PRESSURE: 135 MMHG | RESPIRATION RATE: 18 BRPM | TEMPERATURE: 98.4 F | HEIGHT: 72 IN | BODY MASS INDEX: 42.66 KG/M2 | OXYGEN SATURATION: 97 % | DIASTOLIC BLOOD PRESSURE: 89 MMHG | WEIGHT: 315 LBS | HEART RATE: 64 BPM

## 2018-06-28 PROCEDURE — 6370000000 HC RX 637 (ALT 250 FOR IP): Performed by: INTERNAL MEDICINE

## 2018-06-28 PROCEDURE — 99225 PR SBSQ OBSERVATION CARE/DAY 25 MINUTES: CPT | Performed by: NURSE PRACTITIONER

## 2018-06-28 PROCEDURE — 6360000002 HC RX W HCPCS: Performed by: INTERNAL MEDICINE

## 2018-06-28 PROCEDURE — 96372 THER/PROPH/DIAG INJ SC/IM: CPT

## 2018-06-28 PROCEDURE — 2580000003 HC RX 258: Performed by: INTERNAL MEDICINE

## 2018-06-28 PROCEDURE — G0378 HOSPITAL OBSERVATION PER HR: HCPCS

## 2018-06-28 RX ADMIN — POTASSIUM CHLORIDE 20 MEQ: 1500 TABLET, EXTENDED RELEASE ORAL at 08:25

## 2018-06-28 RX ADMIN — HYDRALAZINE HYDROCHLORIDE 10 MG: 20 INJECTION INTRAMUSCULAR; INTRAVENOUS at 01:29

## 2018-06-28 RX ADMIN — ENOXAPARIN SODIUM 40 MG: 40 INJECTION SUBCUTANEOUS at 08:25

## 2018-06-28 RX ADMIN — ASPIRIN 81 MG CHEWABLE TABLET 81 MG: 81 TABLET CHEWABLE at 08:25

## 2018-06-28 RX ADMIN — BECLOMETHASONE DIPROPIONATE 1 PUFF: 40 AEROSOL, METERED RESPIRATORY (INHALATION) at 08:26

## 2018-06-28 RX ADMIN — METOPROLOL TARTRATE 12.5 MG: 25 TABLET ORAL at 08:25

## 2018-06-28 RX ADMIN — BUMETANIDE 2 MG: 1 TABLET ORAL at 08:25

## 2018-06-28 RX ADMIN — Medication 10 ML: at 08:26

## 2018-06-28 RX ADMIN — ACETAMINOPHEN 650 MG: 325 TABLET, FILM COATED ORAL at 08:25

## 2018-06-28 ASSESSMENT — PAIN SCALES - GENERAL
PAINLEVEL_OUTOF10: 3
PAINLEVEL_OUTOF10: 0
PAINLEVEL_OUTOF10: 0

## 2018-06-28 NOTE — PROGRESS NOTES
Donovan Norman is a 48 y.o. right handed man    Neurolgogy is following for slurred speech    He was unable to tolerate open MRI--he tells me \"it didn't feel very open to me. \"    He still notes b/l hand tingling and L leg weakness---atop tingling in his b/l hips    No further slurred speech or other new weakness    He admits to having severe anxiety over his issues. His BPs are better controlled    No family present    He is medically stable    Objective:     /89   Pulse 64   Temp 98.4 °F (36.9 °C) (Oral)   Resp 18   Ht 6' (1.829 m)   Wt (!) 341 lb 6.4 oz (154.9 kg)   SpO2 97%   BMI 46.30 kg/m²     General appearance: alert, appears stated age and cooperative---in no acute distress sitting up in bed; morbidly obese  Head: Normocephalic, without obvious abnormality, atraumatic  Eyes: conjunctivae/corneas clear.  PERRL  Neck: no adenopathy, no carotid bruit, no JVD, supple, symmetrical, trachea midline and thyroid not enlarged, symmetric, no tenderness/mass/nodules---large neck circumference  Lungs: clear to auscultation bilaterally  Heart: regular rate and rhythm, S1, S2 normal, no murmur, click, rub or gallop   Abd; rotund; soft, nontender  Extremities: extremities normal, atraumatic, no cyanosis or edema  Pulses: 2+ and symmetric  Skin: Skin color, texture, turgor normal. No rashes or lesions     Mental Status: alert, oriented, thought content appropriate---nervous but pleasant and cooperative  Speech: no dysarthria  Language: no aphasias    Cranial Nerves:  I: smell NA   II: visual acuity  NA   II: visual fields Full to confrontation   II: pupils IVÁN   III,VII: ptosis None   III,IV,VI: extraocular muscles  Full ROM   V: mastication Normal   V: facial light touch sensation  Normal to LT and PP   V,VII: corneal reflex     VII: facial muscle function - upper  Normal   VII: facial muscle function - lower Normal   VIII: hearing Normal   IX: soft palate elevation  Normal   IX,X: gag reflex    XI: trapezius strength  5/5   XI: sternocleidomastoid strength 5/5   XI: neck extension strength  5/5   XII: tongue strength  Normal     Motor:  5/5 throughout except 4/5 L leg with questionable effort  Able to push himself up in bed with both legs without issues  No drift  No abnormal movements    Sensory:  LT intact b/l  PP \"duller\" on L arm; \"more tingly\" R leg    Coordination:   HS intact (no issues lifting L leg for HS testing)  FN, FFM intact b/l    Gait:  Moderate [place]'s  Waddling and slow but normal    DTR:   BE arms  Right Quadriceps reflex 1+  Left Quadriceps reflex 1+  Right Achilles reflex 1+  Left Achilles reflex 1+     No Babinskis or Ugarte's    Laboratory/Radiology:     CBC with Differential:    Lab Results   Component Value Date    WBC 7.8 06/27/2018    RBC 4.93 06/27/2018    HGB 15.5 06/27/2018    HCT 45.2 06/27/2018     06/27/2018    MCV 91.7 06/27/2018    MCH 31.4 06/27/2018    MCHC 34.3 06/27/2018    RDW 13.4 06/27/2018    LYMPHOPCT 19.4 06/27/2018    MONOPCT 10.5 06/27/2018    BASOPCT 0.6 06/27/2018    MONOSABS 0.82 06/27/2018    LYMPHSABS 1.52 06/27/2018    EOSABS 0.21 06/27/2018    BASOSABS 0.05 06/27/2018      Ref. Range 6/26/2018 09:17   Hemoglobin A1C Latest Ref Range: 4.0 - 5.6 % 6.1 (H)     CT head WO: no acute events    All labs and images personally reviewed today    Assessment:     The patient presented with slurred speech, blurred vision, and b/l hand numbness---in the setting of hypertensive crisis---with BPs as high as 188/114. CT proved no acute events    Unable to tolerate open MRI    His neuro exam is inconsistent but improving with BP control. He still notes sensory changes in his L arm, R leg, and both hands---I find no weakness in his L leg on exam and question a LS radiculopathy or neuropathy causing his L leg issues. His anxiety is contributing to this picture.     It is reasonable to re-attempt open MRI as an OP---with plan for pre-medication---should his issues persist, however he does not need any additional inpatient neuro testing at this time. He should remain on ASA and continue I discussed aggressive risk factor modification with him including weight loss, daily exercise, tobacco cessation, and med/follow up compliance. We will follow up with him in our office in a few months.     Plan:     Consider repeating open MRI as an OP---with pre-medication    Continue ASA    Lifestyle mod as detailed above    Will sign off---follow up in our office in 3 months    YUMIKO Eckert, CNP  5:55 PM  6/28/2018

## 2018-06-29 LAB
EKG ATRIAL RATE: 67 BPM
EKG P AXIS: 42 DEGREES
EKG P-R INTERVAL: 156 MS
EKG Q-T INTERVAL: 394 MS
EKG QRS DURATION: 84 MS
EKG QTC CALCULATION (BAZETT): 416 MS
EKG R AXIS: -13 DEGREES
EKG T AXIS: -84 DEGREES
EKG VENTRICULAR RATE: 67 BPM

## 2018-08-18 LAB
EKG ATRIAL RATE: 75 BPM
EKG P AXIS: 29 DEGREES
EKG P-R INTERVAL: 164 MS
EKG Q-T INTERVAL: 410 MS
EKG QRS DURATION: 88 MS
EKG QTC CALCULATION (BAZETT): 457 MS
EKG R AXIS: -30 DEGREES
EKG T AXIS: 116 DEGREES
EKG VENTRICULAR RATE: 75 BPM

## 2018-09-11 ENCOUNTER — TELEPHONE (OUTPATIENT)
Dept: ENT CLINIC | Age: 53
End: 2018-09-11

## 2018-09-12 ENCOUNTER — OFFICE VISIT (OUTPATIENT)
Dept: ENT CLINIC | Age: 53
End: 2018-09-12
Payer: MEDICAID

## 2018-09-12 VITALS
WEIGHT: 315 LBS | BODY MASS INDEX: 42.66 KG/M2 | SYSTOLIC BLOOD PRESSURE: 139 MMHG | OXYGEN SATURATION: 94 % | HEART RATE: 84 BPM | HEIGHT: 72 IN | DIASTOLIC BLOOD PRESSURE: 102 MMHG

## 2018-09-12 DIAGNOSIS — R49.0 HOARSENESS OF VOICE: ICD-10-CM

## 2018-09-12 DIAGNOSIS — J38.3 LESION OF VOCAL CORD: Primary | ICD-10-CM

## 2018-09-12 PROCEDURE — 99204 OFFICE O/P NEW MOD 45 MIN: CPT | Performed by: OTOLARYNGOLOGY

## 2018-09-12 PROCEDURE — 4004F PT TOBACCO SCREEN RCVD TLK: CPT | Performed by: OTOLARYNGOLOGY

## 2018-09-12 PROCEDURE — G8598 ASA/ANTIPLAT THER USED: HCPCS | Performed by: OTOLARYNGOLOGY

## 2018-09-12 PROCEDURE — 31575 DIAGNOSTIC LARYNGOSCOPY: CPT | Performed by: OTOLARYNGOLOGY

## 2018-09-12 PROCEDURE — G8427 DOCREV CUR MEDS BY ELIG CLIN: HCPCS | Performed by: OTOLARYNGOLOGY

## 2018-09-12 PROCEDURE — G8417 CALC BMI ABV UP PARAM F/U: HCPCS | Performed by: OTOLARYNGOLOGY

## 2018-09-12 PROCEDURE — 3017F COLORECTAL CA SCREEN DOC REV: CPT | Performed by: OTOLARYNGOLOGY

## 2018-09-12 ASSESSMENT — ENCOUNTER SYMPTOMS
SHORTNESS OF BREATH: 0
SORE THROAT: 1
ABDOMINAL PAIN: 0
RESPIRATORY NEGATIVE: 1
VOICE CHANGE: 1
COLOR CHANGE: 0
EYES NEGATIVE: 1
GASTROINTESTINAL NEGATIVE: 1

## 2018-09-12 NOTE — PATIENT INSTRUCTIONS
Thank you for choosing our [facility], or [facility] practice. We are committed to your medical treatment and  care. To prepare you for surgery, the surgery scheduler will be  calling you to confirm a date for both your surgery and follow up  appointment. Please allow at least 72 hours after your office visit to  receive your appointment dates and times. If you need to reschedule or cancel your surgery or follow up  appointment, please call the surgery scheduler at [phone]. INSTRUCTIONS FOR SURGERY 09/24/18    Nothing to eat or drink after midnight the night before surgery unless surgery is at [facility] or otherwise instructed by the hospital.    DO NOT TAKE ANY ASPIRIN PRODUCTS 7 days prior to surgery. Tylenol only. No Advil, Motrin, Aleve, or Ibuprofen    Any illegal drugs in your system (including Marijuana even if legally prescribed) will result in your surgery being cancelled. Please be sure to check with our office or the hospital on time frame for the drugs to be out of your system. Should your insurance change at any time you must contact our office. Failure to do so may result in your surgery being rescheduled.     [facility] [address], [address] [place] [address] will call you a couple days prior to your surgery and give you further instructions, if any questions call them at [phone]

## 2018-09-12 NOTE — PROGRESS NOTES
procedure. The following findings were noted. Findings:  Mucosa:  pale and boggy and erythematous   Nasal septum:  normal and deviated to left   Turbinates:  pale, swollen, edematous   Adenoid:  normal   Eustachian tubes:  normal   Mucous stranding:  present   Lesions:  absent   Modified Wallace's Maneuver not indicated   Larynx Vocal cord mobility was normal.  Subglottis is patent. Lesion of left false vocal cord noted. Condition:  Stable    Complications:  None    Pictures:                                Assessment:       Diagnosis Orders   1. Hoarseness of voice     2. Lesion of vocal cord                Plan:      I recommend:    Panendoscopy, with esophagoscopy, bronchoscopy and direct laryngoscopy, microsuspension with biposy of left vocal cord    The procedure risks and benefits were discussed with the patient and family. Pt and family understood and decided to proceed with the surgery. Surgical risks include:     -- Tearing of tissues is the most common problem. This is most common in esophagoscopy using a rigid scope. Tearing of the mucosa of the esophagus or hypopharynx can cause mediastinitus or a severe infection in the chest. Injury to the vocal cords can occur with laryngosopy as can injury to the lungs during bronchoscopy. Lung injury can cause air to leak into the chest cavity. If severe it can fill the chest cavity and compress the lungs producing a tension pneumothorax. Insertion of a chest tube would be emergently required to treat the patient.    - injury to lips, teeth or tongue       I will get a CT scan of the neck and have the patient follow up for review    Follow up in 1 week(s)

## 2018-09-13 ENCOUNTER — TELEPHONE (OUTPATIENT)
Dept: CARDIOLOGY CLINIC | Age: 53
End: 2018-09-13

## 2018-09-13 ENCOUNTER — TELEPHONE (OUTPATIENT)
Dept: ENT CLINIC | Age: 53
End: 2018-09-13

## 2018-09-13 NOTE — TELEPHONE ENCOUNTER
Ct Neck Auth Request faxed to [facility] at [phone]. Spoke with Chris Tadeo. It can take up to 45 minutes for them to receive ppw. She stated to call back later and they can try getting in touch with a nurse to see about expediting the case. Direct line for dept.  [phone]

## 2018-09-13 NOTE — TELEPHONE ENCOUNTER
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 9-11-18 EKG  4-11-18 TTE  4-12-18 Cath             Cardiac Catheterization   4/12/18   Cynthia Cristobal Primitivo 48 y.o. Ordering Physician Zain Patrick MD   Cardiac Catheterization   Order: [id number]   Status:  Final result   Visible to patient:  No (Not Released) Next appt:  10/02/2018 at 08:00 AM in Otolaryngology [facility]   Narrative     Zain Patrick MD     4/12/2018 10:52 AM  Procedure:   right heart catheterization        :  Julio Alatorre M.D. Indications:   Re evaluation of known cardiomyopathy, change in clinical status   or cardiac exam or to guide therapy   A (7) Indication - 94; Score 7        Access of:   right internal jugular vein        Procedural Technique: The patient was prepped and draped in a   sterile fashion for access of the right internal jugular vein. The skin and tissue tract were anesthetized with 1% lidocaine. Potential access sites were visualized with the use of an   ultrasound. Using Seldinger technique, access was completed with   ultrasound guidance, with placement of a 7 Fr sheath in the vein.          Procedural Diagnostics: Right heart catheterization was performed   with a Erleen Baird catheter. Hemodynamic measurements were made in   the right atrium, right ventricle, pulmonary artery. Serial   measurements of pulmonary artery wedge pressure were obtained at   end-expiration. Cardiac outputs were measured by both Vero and   thermodilution methods. The sheath was removed and hemostasis   achieved with manual pressure.  The 144      Potassium 3.5 - 5.0 mmol/L 4.4      Chloride 98 - 107 mmol/L 103      CO2 22 - 29 mmol/L 24      Anion Gap 7 - 16 mmol/L 17       Glucose 74 - 109 mg/dL 119   142R, CM     BUN 6 - 20 mg/dL 21       CREATININE 0.7 - 1.2 mg/dL 1.1      GFR Non-African American >=60 mL/min/1.73 >60     Comment: Chronic Kidney Disease: less than 60 ml/min/1.73 sq. m.         Kidney Failure: less than 15 ml/min/1.73 sq.m. Results valid for patients 18 years and older. GFR   >60      Calcium 8.6 - 10.2 mg/dL 9.4      AST       ALT       Total Protein       Alb       Alkaline Phosphatase       Total Bilirubin       Gfr Calculated         Specimen Collected: 04/12/18 08:09 Last Resulted: 04/12/18 08:44 Lab Flowsheet Order Details View Encounter Lab and Collection Details Routing Result History      CM=Additional comments  R=Reference range differs from displayed range                    CBC Auto Differential   Order: [id number]     Status:  Final result   Visible to patient:  No (Not Released) Next appt:  10/02/2018 at 08:00 AM in Otolaryngology (Gopal Aguero DO)   Newer results are available. Click to view them now.      Ref Range & Units 5mo ago 1yr ago    WBC 4.5 - 11.5 E9/L 9.2  9.7     RBC 3.80 - 5.80 E12/L 5.05  5.17     Hemoglobin 12.5 - 16.5 g/dL 15.7  16.6      Hematocrit 37.0 - 54.0 % 46.5  47.3     MCV 80.0 - 99.9 fL 92.1  91.5     MCH 26.0 - 35.0 pg 31.1  32.1     MCHC 32.0 - 34.5 % 33.8  35.1      RDW 11.5 - 15.0 fL 13.7  13.5     Platelets 742 - 523 O8/M 221  222     MPV 7.0 - 12.0 fL 9.1  9.3     Neutrophils % 43.0 - 80.0 % 68.6  63.7     Immature Granulocytes % 0.0 - 5.0 % 0.7  0.9     Lymphocytes % 20.0 - 42.0 % 19.8   23.9     Monocytes % 2.0 - 12.0 % 8.6  9.4     Eosinophils % 0.0 - 6.0 % 1.9  1.6     Basophils % 0.0 - 2.0 % 0.4  0.5     Neutrophils # 1.80 - 7.30 E9/L 6.30  6.14     Immature Granulocytes # E9/L 0.06  0.09     Lymphocytes # 1.50 - 4.00 E9/L 1.82  2.31  Referring Physician Josse Pompa      Date of Birth     1965       Sonographer         Kirk Chen [facility]      Age               53 year(s)       Interpreting        Dmitriy Rojas                                      Physician                                         Any Other     Procedure    Type of Study      TTE procedure:Echo Complete W/Doppler & Color Flow.     Procedure Date  Date: 04/09/2018 Start: 02:17 PM    Study Location: Echo Lab  Technical Quality: Poor visualization due to body habitus. Indications:Shortness of breath, Hypertension, COPD and Congestive heart  failure. Patient Status: Routine    Height: 72 inches Weight: 251 pounds BSA: 2.35 m^2 BMI: 34.04 kg/m^2    BP: 138/80 mmHg    Allergies    - Penicillins.     Findings      Left Ventricle   Limited images due to technically difficult study. Concentric hypertrophy.   Normal LV function. Difficult to determine if there are any wall motion   abnormalities.      Right Ventricle   Normal right ventricle structure and function.      Left Atrium   Normal left atrium. Interatrial septum not well visualized.      Mitral Valve   Physiologic and/or trace mitral regurgitation is present. No evidence of   hemodynamically significant mitral stenosis.      Tricuspid Valve   Normal tricuspid valve structure and function.      Aortic Valve   Trileaflet aortic valve. Normal leaflet mobility. Normal mean gradient.      Pulmonic Valve   Normal pulmonic valve structure and function. Trace pulmonic   insufficiency.      Pericardial Effusion   A pericardial clear space is present suggesting a prominent pericardial   fat pad or hemodynamically insignificant pericardial effusion.      Aorta   Mildly dilated aortic root. 3.9 cm.      Conclusions      Summary   Trileaflet aortic valve. Normal leaflet mobility. Normal mean gradient.   Normal left atrium. Interatrial septum not well visualized.   Limited images due to technically difficult study. Concentric hypertrophy.   Normal LV function. Difficult to determine if there are any wall motion   abnormalities.   Mildly dilated aortic root. 3.9 cm.   Physiologic and/or trace mitral regurgitation is present. No evidence of   hemodynamically significant mitral stenosis.  A pericardial clear space is present suggesting a prominent pericardial   fat pad or hemodynamically insignificant pericardial effusion.   Normal pulmonic valve structure and function.  Trace pulmonic   insufficiency.   Normal right ventricle structure and function.   Normal tricuspid valve structure and function.      Signature      ----------------------------------------------------------------   Electronically signed by Franky Rojas(Interpreting   physician) on 04/11/2018 03:13 PM   ----------------------------------------------------------------     M-Mode/2D Measurements & Calculations      LV Diastolic    LV Systolic Dimension: 3.6 cm   AV Cusp Separation: 1.7   Dimension: 5 cm LV Volume Diastolic: 598.7 ml   cmAO Root Dimension: 3.9   LV FS:28 %      LV Volume Systolic: 54 ml       cm   LV PW           LV EDV/LV EDV Index: 697.0   Diastolic: 1.3  PT/34 KG/N^6KO ESV/LV ESV   cm              Index: 54 ml/23ml/ m^2   Septum          EF Calculated: 55.2 %           RV Diastolic Dimension:   Diastolic: 1.4  LV Mass Index: 118 l/min*m^2    1.9 cm   cm                                                   LA/Aorta: 1.21   LV Mass: 276.44 LVOT: 2.3 cm   g                                               LA volume/Index: 43.6 ml     Doppler Measurements & Calculations      MV Peak E-Wave: 0.51 AV Peak Velocity: 1.16 LVOT Peak Velocity: 1.06 m/s   m/s                  m/s                    LVOT Mean Velocity: 0.59 m/s   MV Peak A-Wave: 0.65 AV Peak Gradient: 5.41 LVOT Peak Gradient: 4.5   m/s                  mmHg                   mmHgLVOT Mean Gradient: 1.9   MV E/A Ratio: 0.78   AV Mean Velocity: 0.73 mmHg   MV Peak Gradient:    m/s   2.5 mmHg             AV Mean Gradient: 2.5   MV Mean Gradient:    mmHg   0.6 mmHg             AV VTI: 15.3 cm   MV Mean Velocity:    AV Area   0.36 m/s             (Continuity):5.16 cm^2 PV Peak Velocity: 1.05 m/s   MV Deceleration                             PV Peak Gradient: 4.37 mmHg   Time: 277.2 msec     LVOT VTI: 19 cm        PV Mean Velocity: 0.64 m/s   MV P1/2t: 59.1 msec                         PV Mean Gradient: 2 mmHg   MVA by PHT:3.72 cm^2   MV Area   (continuity): 5.1   cm^2     [email or web address] [id number]         Specimen Collected: 04/09/18 14:17 Last Resulted: 04/11/18 15:13 Order Details View Encounter Lab and Collection Details Routing Result History         Scans on Order [id number]     Scan on 4/11/2018  3:13 PM by MARIO Acuna            Reviewed By Dorothy Myers MD on 4/11/2018 16:29   Suzanne Myers MD on 4/11/2018 16:29   PACS Images     Show images for ECHO Complete 2D W Doppler W Color   Signed     Electronically signed by Unknown Provider Result on 4/11/18 at 1513 EDT   Order Report        EKG media tab in epic      Results for Debi Pichardo (MRN [id number]) as of 9/13/2018 08:59   Ref.  Range 6/25/2018 15:15 6/26/2018 09:17 6/27/2018 05:38   Sodium Latest Ref Range: 132 - 146 mmol/L 140     Potassium Latest Ref Range: 3.5 - 5.0 mmol/L 3.8     Chloride Latest Ref Range: 98 - 107 mmol/L 103     CO2 Latest Ref Range: 22 - 29 mmol/L 24     BUN Latest Ref Range: 6 - 20 mg/dL 14     Creatinine Latest Ref Range: 0.7 - 1.2 mg/dL 1.0     Anion Gap Latest Ref Range: 7 - 16 mmol/L 13     GFR Non- Latest Ref Range: >=60 mL/min/1.73 >60     GFR  Unknown >60     Glucose Latest Ref Range: 74 - 109 mg/dL 156 (H)     Calcium Latest Ref Range: 8.6 - 10.2 mg/dL 9.1     Pro-BNP Latest Ref Range: 0 - 125 pg/mL 281 (H)     Troponin Latest Ref Range: 0.00 - 0.03 ng/mL <0.01     Hemoglobin A1C Latest Ref Range: 4.0 - 5.6 %  6.1 (H)    Cortisol Latest Ref Range: 2.68 - 18.40 mcg/dL   7.42   TSH Latest Ref Range: 0.270 - 4.200 uIU/mL   1.170   WBC Latest Ref Range: 4.5 - 11.5 E9/L 6.7  7.8   RBC Latest Ref Range: 3.80 - 5.80 E12/L 4.82  4.93   Hemoglobin Quant Latest Ref Range: 12.5 - 16.5 g/dL 15.3  15.5   Hematocrit Latest Ref Range: 37.0 - 54.0 % 45.0  45.2   MCV Latest Ref Range: 80.0 - 99.9 fL 93.4  91.7   MCH Latest Ref Range: 26.0 - 35.0 pg 31.7  31.4   MCHC Latest Ref Range: 32.0 - 34.5 % 34.0  34.3   MPV Latest Ref Range: 7.0 - 12.0 fL 9.2  9.2   RDW Latest Ref Range: 11.5 - 15.0 fL 13.3  13.4   Platelet Count Latest Ref Range: 130 - 450 E9/L 178  201   Neutrophils % Latest Ref Range: 43.0 - 80.0 %   65.9   Immature Granulocytes % Latest Ref Range: 0.0 - 5.0 %   0.9   Lymphocyte % Latest Ref Range: 20.0 - 42.0 %   19.4 (L)   Monocytes % Latest Ref Range: 2.0 - 12.0 %   10.5   Eosinophils % Latest Ref Range: 0.0 - 6.0 %   2.7   Basophils % Latest Ref Range: 0.0 - 2.0 %   0.6   Neutrophils # Latest Ref Range: 1.80 - 7.30 E9/L   5.17   Immature Granulocytes # Latest Units: E9/L   0.07   Lymphocytes # Latest Ref Range: 1.50 - 4.00 E9/L   1.52   Monocytes # Latest Ref Range: 0.10 - 0.95 E9/L   0.82   Eosinophils # Latest Ref Range: 0.05 - 0.50 E9/L   0.21   Basophils # Latest Ref Range: 0.00 - 0.20 E9/L   0.05

## 2018-09-13 NOTE — TELEPHONE ENCOUNTER
LVM informing patient I am working on  [facility] and once I receive auth from insurance I will schedule the ct and contact him

## 2018-09-17 ENCOUNTER — TELEPHONE (OUTPATIENT)
Dept: CARDIOLOGY CLINIC | Age: 53
End: 2018-09-17

## 2018-09-20 ENCOUNTER — TELEPHONE (OUTPATIENT)
Dept: CARDIOLOGY CLINIC | Age: 53
End: 2018-09-20

## 2018-09-20 NOTE — TELEPHONE ENCOUNTER
Rebecca Rom [birth year] [id number]   Plan is to see Dr Maia Martin tomorrow 9-21-18 for cardiac clearance for panendoscopy with esophagoscopy, bronchoscopy and direct laryngoscopy , microsuspension with biopsy of left vocal cord set for Monday 9-25-18 with Dr Len Cardona    Fax clearance to [phone]  Can call Katelin @ [phone] if not able to provide clearance.     Left voice message for Katelin regarding client has appt tomorrow for cardiac clearance    Neri Johnson RN 9/20/2018 10:36 AM

## 2018-09-21 ENCOUNTER — OFFICE VISIT (OUTPATIENT)
Dept: CARDIOLOGY CLINIC | Age: 53
End: 2018-09-21
Payer: MEDICAID

## 2018-09-21 VITALS
OXYGEN SATURATION: 96 % | WEIGHT: 315 LBS | DIASTOLIC BLOOD PRESSURE: 90 MMHG | HEIGHT: 72 IN | BODY MASS INDEX: 42.66 KG/M2 | HEART RATE: 78 BPM | SYSTOLIC BLOOD PRESSURE: 150 MMHG | RESPIRATION RATE: 18 BRPM

## 2018-09-21 DIAGNOSIS — Z01.810 PREOP CARDIOVASCULAR EXAM: ICD-10-CM

## 2018-09-21 DIAGNOSIS — I20.8 ATYPICAL ANGINA (HCC): ICD-10-CM

## 2018-09-21 DIAGNOSIS — I20.8 ANGINA OF EFFORT (HCC): Primary | ICD-10-CM

## 2018-09-21 PROCEDURE — 4004F PT TOBACCO SCREEN RCVD TLK: CPT | Performed by: INTERNAL MEDICINE

## 2018-09-21 PROCEDURE — G8427 DOCREV CUR MEDS BY ELIG CLIN: HCPCS | Performed by: INTERNAL MEDICINE

## 2018-09-21 PROCEDURE — G8417 CALC BMI ABV UP PARAM F/U: HCPCS | Performed by: INTERNAL MEDICINE

## 2018-09-21 PROCEDURE — 99214 OFFICE O/P EST MOD 30 MIN: CPT | Performed by: INTERNAL MEDICINE

## 2018-09-21 PROCEDURE — G8598 ASA/ANTIPLAT THER USED: HCPCS | Performed by: INTERNAL MEDICINE

## 2018-09-21 PROCEDURE — 93000 ELECTROCARDIOGRAM COMPLETE: CPT | Performed by: INTERNAL MEDICINE

## 2018-09-21 PROCEDURE — 3017F COLORECTAL CA SCREEN DOC REV: CPT | Performed by: INTERNAL MEDICINE

## 2018-09-21 NOTE — PROGRESS NOTES
Patient Active Problem List    Diagnosis Date Noted    TIA (transient ischemic attack) 06/25/2018    (HFpEF) heart failure with preserved ejection fraction (HCC)     Class 3 obesity without serious comorbidity with body mass index (BMI) of 45.0 to 49.9 in adult [facility])     Dyspnea on exertion     JARRET (obstructive sleep apnea)     Accelerated hypertension 05/05/2017    Left arm pain 05/04/2017    Atherosclerosis of native coronary artery of native heart with stable angina pectoris ([address]     Essential hypertension     Chronic kidney disease, stage II (mild) 03/06/2017    Pure hypercholesterolemia 03/06/2017    Exertional dyspnea 03/05/2017    Obstructive sleep apnea 03/05/2017    Morbid obesity due to excess calories ([address] 03/05/2017    Carpal tunnel syndrome of right wrist 10/04/2012    Overuse syndrome 10/04/2012    Right shoulder pain 06/27/2012     Past Medical History:   Diagnosis Date    Accelerated hypertension 5/5/2017    CHF (congestive heart failure) ([address]     Depression with anxiety     Fibromyalgia     GI bleeding     Hyperlipidemia     Hypertension     Myocardial infarct ([address]     in past, per testing, date unknown    Sleep apnea     cpap    Urinary frequency        Past Surgical History:   Procedure Laterality Date    CARDIAC CATHETERIZATION  03/16/2017    Dr Harsha Shannon  04/12/2018    Dr. Sanjiv Boothe  08/18/2017    ENDOSCOPY, COLON, DIAGNOSTIC  08/18/2017    TONSILLECTOMY         Allergies   Allergen Reactions    Pcn [Penicillins] Other (See Comments)     As a child, does not remembered     Outpatient Prescriptions Marked as Taking for the 9/21/18 encounter (Office Visit) with Meghan Max MD   Medication Sig Dispense Refill    beclomethasone (QVAR) 80 MCG/ACT inhaler Inhale 2 puffs into the lungs 2 times daily 1 Inhaler 3    losartan (COZAAR) 100 MG tablet Take 1 tablet by mouth nightly 30 tablet 2   

## 2018-09-24 ENCOUNTER — TELEPHONE (OUTPATIENT)
Dept: ENT CLINIC | Age: 53
End: 2018-09-24

## 2018-09-24 NOTE — TELEPHONE ENCOUNTER
Patient was scheduled for surgery today and needed cardiac clearance. He was in office with his cardiologist Dr. Yogesh Love on 9/21. I called their office to check on clearance on 9/21 around 3:30 and spoke to [place], clinical staff. She stated that  did not clear patient due to him having chest pains upon leaving the appointment. I called surgery scheduling and had his sx cancelled for today. Patient did call later on Friday to let our office know he is having a stress test and will notify our office once completed.     Electronically signed by El Lomeli MA on 9/24/18 at 8:04 AM

## 2018-09-26 ENCOUNTER — APPOINTMENT (OUTPATIENT)
Dept: CARDIOLOGY | Age: 53
End: 2018-09-26
Payer: MEDICAID

## 2018-09-26 ENCOUNTER — HOSPITAL ENCOUNTER (OUTPATIENT)
Dept: CT IMAGING | Age: 53
Discharge: HOME | End: 2018-09-28
Payer: MEDICAID

## 2018-09-26 ENCOUNTER — HOSPITAL ENCOUNTER (OUTPATIENT)
Dept: NON INVASIVE DIAGNOSTICS | Age: 53
Discharge: HOME | End: 2018-09-26
Payer: MEDICAID

## 2018-09-26 ENCOUNTER — HOSPITAL ENCOUNTER (OUTPATIENT)
Dept: NUCLEAR MEDICINE | Age: 53
Discharge: HOME | End: 2018-09-28
Payer: MEDICAID

## 2018-09-26 DIAGNOSIS — J38.3 LESION OF VOCAL CORD: ICD-10-CM

## 2018-09-26 DIAGNOSIS — Z01.810 PRE-OPERATIVE CARDIOVASCULAR EXAMINATION: ICD-10-CM

## 2018-09-26 LAB
LV EF: 49 %
LVEF MODALITY: NORMAL

## 2018-09-26 PROCEDURE — 93016 CV STRESS TEST SUPVJ ONLY: CPT | Performed by: INTERNAL MEDICINE

## 2018-09-26 PROCEDURE — 93018 CV STRESS TEST I&R ONLY: CPT | Performed by: INTERNAL MEDICINE

## 2018-09-26 PROCEDURE — 3430000000 HC RX DIAGNOSTIC RADIOPHARMACEUTICAL: Performed by: RADIOLOGY

## 2018-09-26 PROCEDURE — 6360000002 HC RX W HCPCS: Performed by: INTERNAL MEDICINE

## 2018-09-26 PROCEDURE — A9500 TC99M SESTAMIBI: HCPCS | Performed by: RADIOLOGY

## 2018-09-26 PROCEDURE — 78452 HT MUSCLE IMAGE SPECT MULT: CPT

## 2018-09-26 PROCEDURE — 70492 CT SFT TSUE NCK W/O & W/DYE: CPT

## 2018-09-26 PROCEDURE — 6360000004 HC RX CONTRAST MEDICATION: Performed by: RADIOLOGY

## 2018-09-26 PROCEDURE — 93017 CV STRESS TEST TRACING ONLY: CPT

## 2018-09-26 PROCEDURE — 2580000003 HC RX 258: Performed by: RADIOLOGY

## 2018-09-26 RX ORDER — SODIUM CHLORIDE 0.9 % (FLUSH) 0.9 %
10 SYRINGE (ML) INJECTION
Status: COMPLETED | OUTPATIENT
Start: 2018-09-26 | End: 2018-09-26

## 2018-09-26 RX ADMIN — REGADENOSON 0.4 MG: 0.08 INJECTION, SOLUTION INTRAVENOUS at 09:41

## 2018-09-26 RX ADMIN — IOPAMIDOL 90 ML: 755 INJECTION, SOLUTION INTRAVENOUS at 07:15

## 2018-09-26 RX ADMIN — Medication 35 MILLICURIE: at 09:45

## 2018-09-26 RX ADMIN — Medication 10 ML: at 07:15

## 2018-09-26 RX ADMIN — Medication 10 MILLICURIE: at 07:58

## 2018-09-27 ENCOUNTER — TELEPHONE (OUTPATIENT)
Dept: ENT CLINIC | Age: 53
End: 2018-09-27

## 2018-09-28 NOTE — TELEPHONE ENCOUNTER
Patient called back and I advised him of CT result and to let us know when he is cleared by cardio. Patient verbalized understanding.     Electronically signed by Hayden White MA on 9/28/18 at 11:47 AM

## 2018-10-02 ENCOUNTER — TELEPHONE (OUTPATIENT)
Dept: CARDIOTHORACIC SURGERY | Age: 53
End: 2018-10-02

## 2018-10-15 ENCOUNTER — HOSPITAL ENCOUNTER (EMERGENCY)
Age: 53
Discharge: HOME | End: 2018-10-15
Payer: MEDICAID

## 2018-10-15 VITALS
HEIGHT: 72 IN | RESPIRATION RATE: 17 BRPM | OXYGEN SATURATION: 96 % | HEART RATE: 75 BPM | BODY MASS INDEX: 42.66 KG/M2 | WEIGHT: 315 LBS | TEMPERATURE: 96.9 F | DIASTOLIC BLOOD PRESSURE: 110 MMHG | SYSTOLIC BLOOD PRESSURE: 179 MMHG

## 2018-10-15 DIAGNOSIS — R03.0 ELEVATED BLOOD PRESSURE READING: ICD-10-CM

## 2018-10-15 DIAGNOSIS — K02.9 PAIN DUE TO DENTAL CARIES: Primary | ICD-10-CM

## 2018-10-15 PROCEDURE — 6370000000 HC RX 637 (ALT 250 FOR IP): Performed by: PHYSICIAN ASSISTANT

## 2018-10-15 PROCEDURE — 99282 EMERGENCY DEPT VISIT SF MDM: CPT

## 2018-10-15 RX ORDER — OXYCODONE HYDROCHLORIDE AND ACETAMINOPHEN 5; 325 MG/1; MG/1
2 TABLET ORAL ONCE
Status: COMPLETED | OUTPATIENT
Start: 2018-10-15 | End: 2018-10-15

## 2018-10-15 RX ORDER — CLINDAMYCIN HYDROCHLORIDE 300 MG/1
300 CAPSULE ORAL 3 TIMES DAILY
Qty: 30 CAPSULE | Refills: 0 | Status: SHIPPED | OUTPATIENT
Start: 2018-10-15 | End: 2018-10-25

## 2018-10-15 RX ORDER — CLINDAMYCIN HYDROCHLORIDE 150 MG/1
300 CAPSULE ORAL ONCE
Status: COMPLETED | OUTPATIENT
Start: 2018-10-15 | End: 2018-10-15

## 2018-10-15 RX ORDER — OXYCODONE HYDROCHLORIDE AND ACETAMINOPHEN 5; 325 MG/1; MG/1
1 TABLET ORAL EVERY 6 HOURS PRN
Qty: 12 TABLET | Refills: 0 | Status: SHIPPED | OUTPATIENT
Start: 2018-10-15 | End: 2018-10-18

## 2018-10-15 RX ADMIN — CLINDAMYCIN HYDROCHLORIDE 300 MG: 150 CAPSULE ORAL at 04:22

## 2018-10-15 RX ADMIN — OXYCODONE HYDROCHLORIDE AND ACETAMINOPHEN 2 TABLET: 5; 325 TABLET ORAL at 04:22

## 2018-10-15 ASSESSMENT — PAIN SCALES - GENERAL
PAINLEVEL_OUTOF10: 6
PAINLEVEL_OUTOF10: 10
PAINLEVEL_OUTOF10: 10

## 2018-10-15 ASSESSMENT — PAIN DESCRIPTION - LOCATION: LOCATION: TEETH

## 2018-10-15 ASSESSMENT — PAIN DESCRIPTION - PAIN TYPE
TYPE: ACUTE PAIN
TYPE: ACUTE PAIN

## 2018-10-15 NOTE — ED PROVIDER NOTES
Independent:      HPI:  10/15/18, Time: Jose Alfredo Shipman is a 48 y.o. male presenting to the ED for a \" broken tooth\" with pain, worse over the last few days. The complaint has been constant, moderate to severe in severity, and worsened by attempts at eating and drinking. He reports he has had this tooth bothering him for a while, but he has no dentist. He has had no fever. He reports he has been taking Advil and Tylenol for the pain with no relief. ROS:   Pertinent positives and negatives are stated within the HPI, all other systems reviewed and are negative.    --------------------------------------------- PAST HISTORY ---------------------------------------------  Past Medical History:  has a past medical history of Accelerated hypertension; CHF (congestive heart failure) ([facility] Depression with anxiety; Fibromyalgia; GI bleeding; Hyperlipidemia; Hypertension; Myocardial infarct ([facility] Sleep apnea; and Urinary frequency. Past Surgical History:  has a past surgical history that includes Tonsillectomy; Colonoscopy (08/18/2017); Endoscopy, colon, diagnostic (08/18/2017); Cardiac catheterization (03/16/2017); and Cardiac catheterization (04/12/2018). Social History:  reports that he has been smoking Cigarettes. He has a 33.00 pack-year smoking history. He has never used smokeless tobacco. He reports that he drinks about 3.6 oz of alcohol per week . He reports that he uses drugs, including Marijuana. Family History: family history includes Heart Disease in his mother; No Known Problems in his brother, brother, sister, and sister; Other in his father. The patients home medications have been reviewed.     Allergies: Pcn [penicillins]    -------------------------------------------------- RESULTS -------------------------------------------------  All laboratory and radiology results have been personally reviewed by myself   LABS:  No results found for this visit on

## 2018-10-16 ENCOUNTER — TELEPHONE (OUTPATIENT)
Dept: ENT CLINIC | Age: 53
End: 2018-10-16

## 2018-10-16 RX ORDER — IBUPROFEN 200 MG
400 TABLET ORAL EVERY 6 HOURS PRN
COMMUNITY
End: 2019-04-30

## 2018-10-16 RX ORDER — ACETAMINOPHEN 500 MG
500 TABLET ORAL EVERY 6 HOURS PRN
COMMUNITY
End: 2019-04-30

## 2018-10-16 NOTE — PROGRESS NOTES
[facility] PRE-ADMISSION TESTING INSTRUCTIONS    The Preadmission Testing patient is instructed accordingly using the following criteria (check applicable):    ARRIVAL INSTRUCTIONS:  [x] Parking the day of Surgery is located in the Main Entrance lot. Upon entering the door, make an immediate right to the surgery reception desk    [x] Bring photo ID and insurance card    [] Bring in a copy of Living will or Durable Power of  papers. [x] Please be sure to arrange transportation to and from the hospital    [x] Please arrange for someone to be with you the remainder of the day due to having anesthesia      GENERAL INSTRUCTIONS:    [x] Nothing by mouth after midnight, including gum, candy, mints or water    [x] You may brush your teeth, but do not swallow any water    [x] Take medications as instructed with 1-2 oz of water    [] Stop herbal supplements and vitamins 5 days prior to procedure    [] Follow preop dosing of blood thinners per physician instructions    [] Do not take insulin or oral diabetic medications    [] If diabetic and have low blood sugar or feel symptomatic, take 1-2oz apple juice or glucose tablets    [] Bring inhalers day of surgery    [] Bring C-PAP/ Bi-Pap day of surgery    [] Bring urine specimen day of surgery    [x] Antibacterial Soap shower or bath AM of Surgery, no lotion, powders or creams to surgical site    [] Follow bowel prep as instructed per surgeon    [x] No tobacco products within 24 hours of surgery     [x] No alcohol or illegal drug use within 24 hours of surgery.     [x] Jewelry, body piercing's, eyeglasses, contact lenses and dentures are not permitted into surgery (bring cases)      [] Please do not wear any nail polish or make up on the day of surgery    [x] If not already done, you can expect a call from registration    [x] If surgeon requests a time change you will be notified the day prior to surgery    [] If you receive a survey after

## 2018-10-16 NOTE — PROGRESS NOTES
LEFT MESSAGE AT DR. WASHINGTON'S OFFICE THAT JUAN IS TAKING 400MG MOTRIN EVERY 6 HOURS. I INSTRUCTED HIM NOT TO TAKE IT THE MORNING OF SURGERY. THEY SHOULD CALL HIM IF THEY WANT TO STOP IT SOONER.

## 2018-10-22 ENCOUNTER — ANESTHESIA EVENT (OUTPATIENT)
Dept: OPERATING ROOM | Age: 53
End: 2018-10-22
Payer: MEDICAID

## 2018-10-22 ENCOUNTER — TELEPHONE (OUTPATIENT)
Dept: ENT CLINIC | Age: 53
End: 2018-10-22

## 2018-10-22 ENCOUNTER — ANESTHESIA (OUTPATIENT)
Dept: OPERATING ROOM | Age: 53
End: 2018-10-22
Payer: MEDICAID

## 2018-10-22 ENCOUNTER — HOSPITAL ENCOUNTER (OUTPATIENT)
Age: 53
Setting detail: OUTPATIENT SURGERY
Discharge: HOME | End: 2018-10-22
Attending: OTOLARYNGOLOGY | Admitting: OTOLARYNGOLOGY
Payer: MEDICAID

## 2018-10-22 VITALS
SYSTOLIC BLOOD PRESSURE: 151 MMHG | DIASTOLIC BLOOD PRESSURE: 93 MMHG | OXYGEN SATURATION: 93 % | HEART RATE: 78 BPM | RESPIRATION RATE: 20 BRPM | WEIGHT: 315 LBS | BODY MASS INDEX: 42.66 KG/M2 | TEMPERATURE: 97.2 F | HEIGHT: 72 IN

## 2018-10-22 VITALS — SYSTOLIC BLOOD PRESSURE: 201 MMHG | DIASTOLIC BLOOD PRESSURE: 106 MMHG | OXYGEN SATURATION: 93 % | TEMPERATURE: 92.3 F

## 2018-10-22 DIAGNOSIS — J38.3 LESION OF TRUE VOCAL CORD: Primary | ICD-10-CM

## 2018-10-22 DIAGNOSIS — G89.18 POST-OP PAIN: Primary | ICD-10-CM

## 2018-10-22 LAB
ANION GAP SERPL CALCULATED.3IONS-SCNC: 11 MMOL/L (ref 7–16)
BUN BLDV-MCNC: 21 MG/DL (ref 6–20)
CALCIUM SERPL-MCNC: 9.8 MG/DL (ref 8.6–10.2)
CHLORIDE BLD-SCNC: 102 MMOL/L (ref 98–107)
CO2: 28 MMOL/L (ref 22–29)
CREAT SERPL-MCNC: 1.1 MG/DL (ref 0.7–1.2)
GFR AFRICAN AMERICAN: >60
GFR NON-AFRICAN AMERICAN: >60 ML/MIN/1.73
GLUCOSE BLD-MCNC: 103 MG/DL (ref 74–109)
HCT VFR BLD CALC: 45.8 % (ref 37–54)
HEMOGLOBIN: 15.4 G/DL (ref 12.5–16.5)
MCH RBC QN AUTO: 32.3 PG (ref 26–35)
MCHC RBC AUTO-ENTMCNC: 33.6 % (ref 32–34.5)
MCV RBC AUTO: 96 FL (ref 80–99.9)
PDW BLD-RTO: 13.2 FL (ref 11.5–15)
PLATELET # BLD: 200 E9/L (ref 130–450)
PMV BLD AUTO: 9.1 FL (ref 7–12)
POTASSIUM SERPL-SCNC: 4.8 MMOL/L (ref 3.5–5)
RBC # BLD: 4.77 E12/L (ref 3.8–5.8)
SODIUM BLD-SCNC: 141 MMOL/L (ref 132–146)
WBC # BLD: 9.3 E9/L (ref 4.5–11.5)

## 2018-10-22 PROCEDURE — 36415 COLL VENOUS BLD VENIPUNCTURE: CPT

## 2018-10-22 PROCEDURE — 7100000011 HC PHASE II RECOVERY - ADDTL 15 MIN: Performed by: OTOLARYNGOLOGY

## 2018-10-22 PROCEDURE — 3700000000 HC ANESTHESIA ATTENDED CARE: Performed by: OTOLARYNGOLOGY

## 2018-10-22 PROCEDURE — 85027 COMPLETE CBC AUTOMATED: CPT

## 2018-10-22 PROCEDURE — 31536 LARYNGOSCOPY W/BX & OP SCOPE: CPT | Performed by: OTOLARYNGOLOGY

## 2018-10-22 PROCEDURE — 6370000000 HC RX 637 (ALT 250 FOR IP): Performed by: ANESTHESIOLOGY

## 2018-10-22 PROCEDURE — 6360000002 HC RX W HCPCS

## 2018-10-22 PROCEDURE — 2580000003 HC RX 258: Performed by: NURSE ANESTHETIST, CERTIFIED REGISTERED

## 2018-10-22 PROCEDURE — 3600000012 HC SURGERY LEVEL 2 ADDTL 15MIN: Performed by: OTOLARYNGOLOGY

## 2018-10-22 PROCEDURE — 2580000003 HC RX 258: Performed by: STUDENT IN AN ORGANIZED HEALTH CARE EDUCATION/TRAINING PROGRAM

## 2018-10-22 PROCEDURE — 2709999900 HC NON-CHARGEABLE SUPPLY: Performed by: OTOLARYNGOLOGY

## 2018-10-22 PROCEDURE — 3700000001 HC ADD 15 MINUTES (ANESTHESIA): Performed by: OTOLARYNGOLOGY

## 2018-10-22 PROCEDURE — 7100000010 HC PHASE II RECOVERY - FIRST 15 MIN: Performed by: OTOLARYNGOLOGY

## 2018-10-22 PROCEDURE — 6360000002 HC RX W HCPCS: Performed by: STUDENT IN AN ORGANIZED HEALTH CARE EDUCATION/TRAINING PROGRAM

## 2018-10-22 PROCEDURE — 7100000001 HC PACU RECOVERY - ADDTL 15 MIN: Performed by: OTOLARYNGOLOGY

## 2018-10-22 PROCEDURE — 80048 BASIC METABOLIC PNL TOTAL CA: CPT

## 2018-10-22 PROCEDURE — 7100000000 HC PACU RECOVERY - FIRST 15 MIN: Performed by: OTOLARYNGOLOGY

## 2018-10-22 PROCEDURE — 2500000003 HC RX 250 WO HCPCS: Performed by: NURSE ANESTHETIST, CERTIFIED REGISTERED

## 2018-10-22 PROCEDURE — 3600000002 HC SURGERY LEVEL 2 BASE: Performed by: OTOLARYNGOLOGY

## 2018-10-22 PROCEDURE — 6360000002 HC RX W HCPCS: Performed by: NURSE ANESTHETIST, CERTIFIED REGISTERED

## 2018-10-22 RX ORDER — MIDAZOLAM HYDROCHLORIDE 1 MG/ML
INJECTION INTRAMUSCULAR; INTRAVENOUS PRN
Status: DISCONTINUED | OUTPATIENT
Start: 2018-10-22 | End: 2018-10-22 | Stop reason: SDUPTHER

## 2018-10-22 RX ORDER — ONDANSETRON 2 MG/ML
INJECTION INTRAMUSCULAR; INTRAVENOUS PRN
Status: DISCONTINUED | OUTPATIENT
Start: 2018-10-22 | End: 2018-10-22 | Stop reason: SDUPTHER

## 2018-10-22 RX ORDER — FENTANYL CITRATE 50 UG/ML
INJECTION, SOLUTION INTRAMUSCULAR; INTRAVENOUS PRN
Status: DISCONTINUED | OUTPATIENT
Start: 2018-10-22 | End: 2018-10-22 | Stop reason: SDUPTHER

## 2018-10-22 RX ORDER — NEOSTIGMINE METHYLSULFATE 1 MG/ML
INJECTION, SOLUTION INTRAVENOUS PRN
Status: DISCONTINUED | OUTPATIENT
Start: 2018-10-22 | End: 2018-10-22 | Stop reason: SDUPTHER

## 2018-10-22 RX ORDER — LABETALOL HYDROCHLORIDE 5 MG/ML
INJECTION, SOLUTION INTRAVENOUS PRN
Status: DISCONTINUED | OUTPATIENT
Start: 2018-10-22 | End: 2018-10-22 | Stop reason: SDUPTHER

## 2018-10-22 RX ORDER — SUCCINYLCHOLINE CHLORIDE 20 MG/ML
INJECTION INTRAMUSCULAR; INTRAVENOUS PRN
Status: DISCONTINUED | OUTPATIENT
Start: 2018-10-22 | End: 2018-10-22 | Stop reason: SDUPTHER

## 2018-10-22 RX ORDER — SODIUM CHLORIDE, SODIUM LACTATE, POTASSIUM CHLORIDE, CALCIUM CHLORIDE 600; 310; 30; 20 MG/100ML; MG/100ML; MG/100ML; MG/100ML
INJECTION, SOLUTION INTRAVENOUS CONTINUOUS PRN
Status: DISCONTINUED | OUTPATIENT
Start: 2018-10-22 | End: 2018-10-22 | Stop reason: SDUPTHER

## 2018-10-22 RX ORDER — FENTANYL CITRATE 50 UG/ML
50 INJECTION, SOLUTION INTRAMUSCULAR; INTRAVENOUS EVERY 5 MIN PRN
Status: DISCONTINUED | OUTPATIENT
Start: 2018-10-22 | End: 2018-10-22 | Stop reason: HOSPADM

## 2018-10-22 RX ORDER — DEXAMETHASONE SODIUM PHOSPHATE 4 MG/ML
INJECTION, SOLUTION INTRA-ARTICULAR; INTRALESIONAL; INTRAMUSCULAR; INTRAVENOUS; SOFT TISSUE PRN
Status: DISCONTINUED | OUTPATIENT
Start: 2018-10-22 | End: 2018-10-22 | Stop reason: SDUPTHER

## 2018-10-22 RX ORDER — HYDRALAZINE HYDROCHLORIDE 20 MG/ML
INJECTION INTRAMUSCULAR; INTRAVENOUS PRN
Status: DISCONTINUED | OUTPATIENT
Start: 2018-10-22 | End: 2018-10-22 | Stop reason: SDUPTHER

## 2018-10-22 RX ORDER — TRAMADOL HYDROCHLORIDE 50 MG/1
TABLET ORAL
Status: DISCONTINUED
Start: 2018-10-22 | End: 2018-10-22 | Stop reason: HOSPADM

## 2018-10-22 RX ORDER — ROCURONIUM BROMIDE 10 MG/ML
INJECTION, SOLUTION INTRAVENOUS PRN
Status: DISCONTINUED | OUTPATIENT
Start: 2018-10-22 | End: 2018-10-22 | Stop reason: SDUPTHER

## 2018-10-22 RX ORDER — TRAMADOL HYDROCHLORIDE 50 MG/1
50 TABLET ORAL
Status: COMPLETED | OUTPATIENT
Start: 2018-10-22 | End: 2018-10-22

## 2018-10-22 RX ORDER — PROPOFOL 10 MG/ML
INJECTION, EMULSION INTRAVENOUS PRN
Status: DISCONTINUED | OUTPATIENT
Start: 2018-10-22 | End: 2018-10-22 | Stop reason: SDUPTHER

## 2018-10-22 RX ORDER — GLYCOPYRROLATE 0.2 MG/ML
INJECTION INTRAMUSCULAR; INTRAVENOUS PRN
Status: DISCONTINUED | OUTPATIENT
Start: 2018-10-22 | End: 2018-10-22 | Stop reason: SDUPTHER

## 2018-10-22 RX ORDER — SODIUM CHLORIDE 0.9 % (FLUSH) 0.9 %
10 SYRINGE (ML) INJECTION EVERY 12 HOURS SCHEDULED
Status: DISCONTINUED | OUTPATIENT
Start: 2018-10-22 | End: 2018-10-22 | Stop reason: HOSPADM

## 2018-10-22 RX ORDER — SODIUM CHLORIDE 0.9 % (FLUSH) 0.9 %
10 SYRINGE (ML) INJECTION PRN
Status: DISCONTINUED | OUTPATIENT
Start: 2018-10-22 | End: 2018-10-22 | Stop reason: HOSPADM

## 2018-10-22 RX ORDER — TRAMADOL HYDROCHLORIDE 50 MG/1
50 TABLET ORAL EVERY 6 HOURS PRN
Qty: 20 TABLET | Refills: 0 | Status: SHIPPED | OUTPATIENT
Start: 2018-10-22 | End: 2018-10-27

## 2018-10-22 RX ORDER — LIDOCAINE HYDROCHLORIDE 20 MG/ML
INJECTION, SOLUTION INFILTRATION; PERINEURAL PRN
Status: DISCONTINUED | OUTPATIENT
Start: 2018-10-22 | End: 2018-10-22 | Stop reason: SDUPTHER

## 2018-10-22 RX ADMIN — FENTANYL CITRATE 50 MCG: 50 INJECTION, SOLUTION INTRAMUSCULAR; INTRAVENOUS at 07:36

## 2018-10-22 RX ADMIN — ROCURONIUM BROMIDE 10 MG: 10 SOLUTION INTRAVENOUS at 07:44

## 2018-10-22 RX ADMIN — ONDANSETRON HYDROCHLORIDE 4 MG: 2 INJECTION, SOLUTION INTRAMUSCULAR; INTRAVENOUS at 07:50

## 2018-10-22 RX ADMIN — FENTANYL CITRATE 50 MCG: 50 INJECTION, SOLUTION INTRAMUSCULAR; INTRAVENOUS at 08:05

## 2018-10-22 RX ADMIN — SUCCINYLCHOLINE CHLORIDE 180 MG: 20 INJECTION, SOLUTION INTRAMUSCULAR; INTRAVENOUS at 07:32

## 2018-10-22 RX ADMIN — CEFAZOLIN 3 G: 1 INJECTION, POWDER, FOR SOLUTION INTRAMUSCULAR; INTRAVENOUS; PARENTERAL at 07:28

## 2018-10-22 RX ADMIN — PROPOFOL 50 MG: 10 INJECTION, EMULSION INTRAVENOUS at 08:04

## 2018-10-22 RX ADMIN — LIDOCAINE HYDROCHLORIDE 100 MG: 20 INJECTION, SOLUTION INFILTRATION; PERINEURAL at 07:31

## 2018-10-22 RX ADMIN — TRAMADOL HYDROCHLORIDE 50 MG: 50 TABLET, FILM COATED ORAL at 10:02

## 2018-10-22 RX ADMIN — PROPOFOL 300 MG: 10 INJECTION, EMULSION INTRAVENOUS at 07:32

## 2018-10-22 RX ADMIN — Medication 3 MG: at 08:15

## 2018-10-22 RX ADMIN — HYDRALAZINE HYDROCHLORIDE 10 MG: 20 INJECTION INTRAMUSCULAR; INTRAVENOUS at 07:29

## 2018-10-22 RX ADMIN — DEXAMETHASONE SODIUM PHOSPHATE 8 MG: 4 INJECTION, SOLUTION INTRAMUSCULAR; INTRAVENOUS at 07:50

## 2018-10-22 RX ADMIN — GLYCOPYRROLATE 0.2 MG: 0.2 INJECTION, SOLUTION INTRAMUSCULAR; INTRAVENOUS at 07:54

## 2018-10-22 RX ADMIN — ROCURONIUM BROMIDE 20 MG: 10 SOLUTION INTRAVENOUS at 07:37

## 2018-10-22 RX ADMIN — HYDROCORTISONE SODIUM SUCCINATE 100 MG: 100 INJECTION, POWDER, FOR SOLUTION INTRAMUSCULAR; INTRAVENOUS at 09:21

## 2018-10-22 RX ADMIN — MIDAZOLAM HYDROCHLORIDE 2 MG: 1 INJECTION, SOLUTION INTRAMUSCULAR; INTRAVENOUS at 07:28

## 2018-10-22 RX ADMIN — SODIUM CHLORIDE, POTASSIUM CHLORIDE, SODIUM LACTATE AND CALCIUM CHLORIDE: 600; 310; 30; 20 INJECTION, SOLUTION INTRAVENOUS at 07:30

## 2018-10-22 RX ADMIN — GLYCOPYRROLATE 0.6 MG: 0.2 INJECTION, SOLUTION INTRAMUSCULAR; INTRAVENOUS at 08:15

## 2018-10-22 RX ADMIN — PROPOFOL 50 MG: 10 INJECTION, EMULSION INTRAVENOUS at 07:40

## 2018-10-22 RX ADMIN — LABETALOL HYDROCHLORIDE 5 MG: 5 INJECTION, SOLUTION INTRAVENOUS at 08:43

## 2018-10-22 ASSESSMENT — PAIN DESCRIPTION - DESCRIPTORS
DESCRIPTORS: BURNING
DESCRIPTORS: DISCOMFORT;ACHING
DESCRIPTORS: BURNING

## 2018-10-22 ASSESSMENT — PULMONARY FUNCTION TESTS
PIF_VALUE: 42
PIF_VALUE: 20
PIF_VALUE: 34
PIF_VALUE: 3
PIF_VALUE: 45
PIF_VALUE: 0
PIF_VALUE: 1
PIF_VALUE: 45
PIF_VALUE: 45
PIF_VALUE: 8
PIF_VALUE: 1
PIF_VALUE: 45
PIF_VALUE: 42
PIF_VALUE: 5
PIF_VALUE: 42
PIF_VALUE: 1
PIF_VALUE: 42
PIF_VALUE: 3
PIF_VALUE: 39
PIF_VALUE: 41
PIF_VALUE: 1
PIF_VALUE: 1
PIF_VALUE: 17
PIF_VALUE: 35
PIF_VALUE: 42
PIF_VALUE: 45
PIF_VALUE: 43
PIF_VALUE: 42
PIF_VALUE: 1
PIF_VALUE: 18
PIF_VALUE: 34
PIF_VALUE: 42
PIF_VALUE: 42
PIF_VALUE: 1
PIF_VALUE: 45
PIF_VALUE: 42
PIF_VALUE: 42
PIF_VALUE: 1
PIF_VALUE: 1
PIF_VALUE: 33
PIF_VALUE: 1
PIF_VALUE: 45
PIF_VALUE: 35
PIF_VALUE: 42
PIF_VALUE: 32
PIF_VALUE: 45
PIF_VALUE: 17
PIF_VALUE: 0
PIF_VALUE: 1
PIF_VALUE: 47
PIF_VALUE: 45
PIF_VALUE: 42
PIF_VALUE: 42
PIF_VALUE: 10
PIF_VALUE: 47
PIF_VALUE: 28
PIF_VALUE: 2
PIF_VALUE: 42
PIF_VALUE: 1
PIF_VALUE: 1
PIF_VALUE: 45
PIF_VALUE: 47
PIF_VALUE: 42
PIF_VALUE: 19
PIF_VALUE: 35
PIF_VALUE: 45
PIF_VALUE: 45
PIF_VALUE: 1
PIF_VALUE: 42
PIF_VALUE: 45
PIF_VALUE: 40
PIF_VALUE: 45
PIF_VALUE: 33
PIF_VALUE: 1
PIF_VALUE: 4
PIF_VALUE: 1
PIF_VALUE: 45
PIF_VALUE: 47
PIF_VALUE: 1

## 2018-10-22 ASSESSMENT — PAIN - FUNCTIONAL ASSESSMENT: PAIN_FUNCTIONAL_ASSESSMENT: 0-10

## 2018-10-22 ASSESSMENT — PAIN DESCRIPTION - ORIENTATION
ORIENTATION: RIGHT
ORIENTATION: RIGHT

## 2018-10-22 ASSESSMENT — PAIN DESCRIPTION - PAIN TYPE
TYPE: SURGICAL PAIN
TYPE: SURGICAL PAIN

## 2018-10-22 ASSESSMENT — PAIN SCALES - GENERAL
PAINLEVEL_OUTOF10: 10
PAINLEVEL_OUTOF10: 6
PAINLEVEL_OUTOF10: 5
PAINLEVEL_OUTOF10: 7
PAINLEVEL_OUTOF10: 10
PAINLEVEL_OUTOF10: 4

## 2018-10-22 ASSESSMENT — PAIN DESCRIPTION - PROGRESSION: CLINICAL_PROGRESSION: GRADUALLY IMPROVING

## 2018-10-22 ASSESSMENT — ENCOUNTER SYMPTOMS: SHORTNESS OF BREATH: 1

## 2018-10-22 ASSESSMENT — PAIN DESCRIPTION - LOCATION
LOCATION: OTHER (COMMENT)
LOCATION: MOUTH

## 2018-10-22 NOTE — OP NOTE
10/22/2018  Cynthia Cristobal Primitivo  [id number]      Pre-operative Diagnosis: L vocal cord lesion    Post-operative Diagnosis: same    Procedure: Direct laryngoscopy    Anesthesia: General endotracheal anesthesia    Surgeons/Assistants: Gaby/Kym    Estimated Blood Loss: less than 50     Complications: Unable to visualize larynx, esophagus, or lungs. No biopsy performed due to difficult exposure     Specimens: was not Obtained    DESCRIPTION OF PROCEDURE: The patient was consented preoperatively, taken   back to the operating room, identified, and appropriately placed in the   supine position. Given anesthesia per general intubation. Once the patient   was intubated, they were turned 90 degrees, and the bed was elevated to my hip   height. A tooth guard was not placed in the patient's   oral cavity to protect his upper teeth. Esophagoscopy  An esophagoscope was then placed in the patient's oral cavity down to his   posterior pharynx. The esophagoscope was attempted to be placed into the esophageal sphincter   and allowed to go down to the inferior aspect of the esophagus. This was unable to be passed in a safe manner. Direct laryngoscopy  A Ossoff-Pilling, dedo, and holliger scope was placed in the patient's oral cavity. All areas of oropharynx and hypopharynx were evaluated starting with the right base of the tongue, left base of tongue right vallecula and left vallecula. The scope was then attempted to be used to lift the posterior aspect of the epiglottis to evaluate the larynx, bilateral aryepiglottic folds, false vocal cords, anterior commissure, true vocal cords and subglottis. This was unable to be done due to the extensive soft tissue and edema of the epiglottis. The true vocal cords were unable to be seen. The glidescope was used and gave the best visualization however no instrumentation was able to be passed to perform the biopsy.  Due to the anterior location and extensive soft tissue in the glottis the case was unable to be continued. Bronchoscopy  A flexible bronchoscope was attempted to be passed the endotracheal tube however the patient did not tolerate this due to hypoxia. The bronchoscopy was aborted. The The patient was then turned back to Anesthesia for appropriate awakening. The patient's oral cavity was examined after the tooth guard was removed and teeth were all intact. Dr. Cheli Linder was present and scrubbed for the entire case.     Electronically signed by Babak Valdivia DO on 10/22/18 at 12:26 PM

## 2018-10-22 NOTE — PROGRESS NOTES
Dr Chin Vu aware that patient was in ED on 10- with tooth infection and on antibiotics/on CPAP at home didn't bring machine or know settings. Dr Jennifer Goss aware of recent tooth infection and on antibiotic

## 2018-10-22 NOTE — ANESTHESIA PRE PROCEDURE
Minda Gleason DO           Allergies: Allergies   Allergen Reactions    Pcn [Penicillins] Other (See Comments)     As a child, does not remembered       Problem List:    Patient Active Problem List   Diagnosis Code    Right shoulder pain M25.511    Carpal tunnel syndrome of right wrist G56.01    Overuse syndrome T14. 8XXA    Exertional dyspnea R06.09    Obstructive sleep apnea G47.33    Morbid obesity due to excess calories ([facility]) E66.01    Chronic kidney disease, stage II (mild) N18.2    Pure hypercholesterolemia E78.00    Atherosclerosis of native coronary artery of native heart with stable angina pectoris ([facility]) I25.118    Essential hypertension I10    Left arm pain M79.602    Accelerated hypertension I10    (HFpEF) heart failure with preserved ejection fraction ([facility]) I50.30    Class 3 obesity without serious comorbidity with body mass index (BMI) of 45.0 to 49.9 in adult [id number]    Dyspnea on exertion R06.09    JARRET (obstructive sleep apnea) G47.33    TIA (transient ischemic attack) G45.9       Past Medical History:        Diagnosis Date    Accelerated hypertension 5/5/2017    Cerebral artery occlusion with cerebral infarction ([facility] [address]     TIA    CHF (congestive heart failure) (HCC)     Depression with anxiety     Fibromyalgia     GI bleeding     Hyperlipidemia     Hypertension     Myocardial infarct ([facility])     in past, per testing, date unknown    Sleep apnea     cpap    Urinary frequency        Past Surgical History:        Procedure Laterality Date    CARDIAC CATHETERIZATION  03/16/2017    Dr Yonathan Martin  04/12/2018    Dr. Isaura Walker COLONOSCOPY  08/18/2017    ENDOSCOPY, COLON, DIAGNOSTIC  08/18/2017    TONSILLECTOMY         Social History:    Social History   Substance Use Topics    Smoking status: Current Every Day Smoker     Packs/day: 0.25     Years: 33.00     Types: Cigarettes    Smokeless tobacco: Never Used      Comment: currently 2-3 for: PHART, PO2ART, NGH9LTV, PYV9RJB, BEART, Z3CGXXAY     Type & Screen (If Applicable):  No results found for: [facility]    Anesthesia Evaluation  Patient summary reviewed no history of anesthetic complications:   Airway: Mallampati: III  TM distance: <3 FB   Neck ROM: full  Mouth opening: > = 3 FB Dental:      Comment: Chipped front tooth  Broken back teeth    Pulmonary: breath sounds clear to auscultation  (+) shortness of breath:  sleep apnea:                            ROS comment: Vocal cord mass   Cardiovascular:    (+) hypertension:, past MI:, CAD:, SWENSON:, hyperlipidemia        Rhythm: regular             Beta Blocker:  Dose within 24 Hrs         Neuro/Psych:   (+) neuromuscular disease (fibromyalgia):, TIA,    (-) CVA           GI/Hepatic/Renal: Neg GI/Hepatic/Renal ROS  (+) morbid obesity          Endo/Other: Negative Endo/Other ROS                    Abdominal:           Vascular: negative vascular ROS. Anesthesia Plan      general     ASA 4     (Use glidescope )  Induction: intravenous. MIPS: Postoperative opioids intended and Prophylactic antiemetics administered. Anesthetic plan and risks discussed with patient and spouse. Plan discussed with CRNA.           [address],    10/22/2018

## 2018-10-22 NOTE — H&P
Subjective:      Patient ID:  Narcisa Ivey is a 48 y.o. male.     HPI:     Hoarseness  Patient presents with hoarseness. The patient describes moderate episodes of hoarseness which are gradually worsening over time. The episodes began 6 month(s) ago.       Symptoms of gastroesophageal reflux is not noted. Tx: none      Symptoms of allergic rhinitis is not noted. Tx: nothing additional        Trauma/Surgeries in the chest or neck? no  Type:      Previous prolonged intubation: no  Time:   ago.                            History   Smoking Status    Current Every Day Smoker    Packs/day: 0.10    Years: 25.00    Types: Cigarettes   Smokeless Tobacco    Never Used       Comment: currently 2 cigarettes/day; also using nicoderm patches      Social History   Social History            Social History    Marital status: Single       Spouse name: N/A    Number of children: N/A    Years of education: N/A             Social History Main Topics    Smoking status: Current Every Day Smoker       Packs/day: 0.10       Years: 25.00       Types: Cigarettes    Smokeless tobacco: Never Used         Comment: currently 2 cigarettes/day; also using nicoderm patches    Alcohol use 3.0 oz/week       5 Cans of beer per week         Comment: drinks only on the weekends    Drug use: Yes       Frequency: 2.0 times per week       Types: Marijuana    Sexual activity: Not Asked           Other Topics Concern    None          Social History Narrative    None                  Patient's medications, allergies, past medical, surgical, social and family histories were reviewed and updated as appropriate.           Review of Systems   Constitutional: Negative. HENT: Positive for sore throat and voice change. Eyes: Negative. Negative for visual disturbance. Respiratory: Negative. Negative for shortness of breath. Cardiovascular: Negative. Negative for chest pain. Gastrointestinal: Negative.   Negative for abdominal

## 2018-10-22 NOTE — PROGRESS NOTES
1000 nourishment provided;wife to bedside  1040 DISCHARGE INSTRUCTIONS REVIEWED;VERBALIZES UNDERSTANDING

## 2018-10-22 NOTE — ANESTHESIA POSTPROCEDURE EVALUATION
Department of Anesthesiology  Postprocedure Note    Patient: Johnnie Finney  MRN: [id number]  YOB: 1965  Date of evaluation: 10/22/2018  Time:  11:11 AM     Procedure Summary     Date:  10/22/18 Room / Location:  [facility] OR 01 / [facility] OR    Anesthesia Start:  [address]  Anesthesia Stop:  9854    Procedure:  DIRECT LARYNGOSCOPY (N/A Throat) Diagnosis:  (VOCAL CORD MASS)    Surgeon:  Bharathi Rain DO Responsible Provider:  Adriana Harris DO    Anesthesia Type:  general ASA Status:  4          Anesthesia Type: general    Kalen Phase I: Kalen Score: 10    Kalen Phase II: Kalen Score: 10    Last vitals: Reviewed and per EMR flowsheets.        Anesthesia Post Evaluation    Patient location during evaluation: PACU  Patient participation: complete - patient participated  Level of consciousness: awake and alert  Airway patency: patent  Nausea & Vomiting: no nausea and no vomiting  Complications: no  Cardiovascular status: hemodynamically stable  Respiratory status: acceptable  Hydration status: euvolemic

## 2018-10-26 NOTE — TELEPHONE ENCOUNTER
Dr. Hari Client would like to see the patient Monday if he is willing. I called the patient and LVM asking him to callback so we could discuss moving his appt to see Dr. Hair Client. I approved with Dr. Jus Baker to move appt.     Electronically signed by Kristie Page MA on 10/26/18 at 10:55 AM

## 2018-10-29 ENCOUNTER — TELEPHONE (OUTPATIENT)
Dept: ENT CLINIC | Age: 53
End: 2018-10-29

## 2018-10-29 ENCOUNTER — OFFICE VISIT (OUTPATIENT)
Dept: ENT CLINIC | Age: 53
End: 2018-10-29
Payer: MEDICAID

## 2018-10-29 VITALS
OXYGEN SATURATION: 95 % | WEIGHT: 315 LBS | DIASTOLIC BLOOD PRESSURE: 110 MMHG | SYSTOLIC BLOOD PRESSURE: 166 MMHG | HEIGHT: 72 IN | BODY MASS INDEX: 42.66 KG/M2 | HEART RATE: 86 BPM

## 2018-10-29 DIAGNOSIS — J38.3 FALSE VOCAL CORD LESION: Primary | ICD-10-CM

## 2018-10-29 PROCEDURE — G8427 DOCREV CUR MEDS BY ELIG CLIN: HCPCS | Performed by: OTOLARYNGOLOGY

## 2018-10-29 PROCEDURE — G8417 CALC BMI ABV UP PARAM F/U: HCPCS | Performed by: OTOLARYNGOLOGY

## 2018-10-29 PROCEDURE — 4004F PT TOBACCO SCREEN RCVD TLK: CPT | Performed by: OTOLARYNGOLOGY

## 2018-10-29 PROCEDURE — G8598 ASA/ANTIPLAT THER USED: HCPCS | Performed by: OTOLARYNGOLOGY

## 2018-10-29 PROCEDURE — 3017F COLORECTAL CA SCREEN DOC REV: CPT | Performed by: OTOLARYNGOLOGY

## 2018-10-29 PROCEDURE — G8484 FLU IMMUNIZE NO ADMIN: HCPCS | Performed by: OTOLARYNGOLOGY

## 2018-10-29 PROCEDURE — 99213 OFFICE O/P EST LOW 20 MIN: CPT | Performed by: OTOLARYNGOLOGY

## 2018-10-30 RX ORDER — CLINDAMYCIN HYDROCHLORIDE 300 MG/1
300 CAPSULE ORAL 3 TIMES DAILY
COMMUNITY
End: 2019-09-12

## 2018-10-30 NOTE — PROGRESS NOTES
SPOKE WITH   [facility] ABOUT DIFFICULT INTUBATION AT [facility] 10/22/18

## 2018-10-30 NOTE — PROGRESS NOTES
Harry 38 PRE-ADMISSION TESTING GENERAL INSTRUCTIONS- [facility]-phone number:[phone]    GENERAL INSTRUCTIONS  [x] Antibacterial Soap shower Night before and/or AM of Surgery  [] Jaylon wipe instruction sheet and wipes given. [x] Nothing by mouth after midnight, including gum, candy, mints, or water.   [] You may brush your teeth, gargle, but do NOT swallow water. []Hibiclens shower  the night before and the morning of surgery. Do not use             Hibiclens on your face or head. [x]No smoking, chewing tobacco, illegal drugs, or alcohol within 24 hours of your surgery. [x] Jewelry, valuables or body piercing's should not be brought to the hospital. All body and/or tongue piercing's must be removed prior to arriving to hospital.  ALL hair pins must be removed. [x] Do not wear makeup, lotions, powders, deodorant. Nail polish as directed by the nurse. [x] Arrange transportation to and from the hospital.  Arrange for someone to be with you for the remainder of the day and for 24 hours after your procedure due to having had anesthesia. [x] Bring insurance card and photo ID.  [] Transfusion Bracelet: Please bring with you to hospital, day of surgery  [] Bring urine specimen day of surgery. Any small container is acceptable. [] Use inhalers the morning of surgery and bring with you to hospital.   []Bring copy of living will or healthcare power of  papers to be placed in your electronic record. [] CPAP/BI-PAP: Please bring your machine if you are to spend the night in the hospital.     ENDOSCOPY INSTRUCTIONS:   [] Bowel prep instructions reviewed. [] Nothing by mouth after midnight, including gum, candy, mints, or water.  [] You may brush your teeth, gargle, but do NOT swallow water. [] Do not wear makeup, lotions, powders, deodorant. Nail polish as directed by the nurse.   [] Arrange transportation to and from the hospital.  Arrange for someone to be with you for the

## 2018-10-31 ENCOUNTER — ANESTHESIA EVENT (OUTPATIENT)
Dept: OPERATING ROOM | Age: 53
End: 2018-10-31
Payer: MEDICAID

## 2018-10-31 ASSESSMENT — ENCOUNTER SYMPTOMS
VOMITING: 0
SHORTNESS OF BREATH: 0
COUGH: 0

## 2018-10-31 NOTE — H&P
biopsy.     Dr. Alta Alston.  Otolaryngology Facial Plastic Surgery  :  [facility] Otolaryngology/Facial Plastic Surgery Residency  Associate Clinical Professor:  Adah Lefort, [facility]

## 2018-10-31 NOTE — PROGRESS NOTES
ibuprofen (ADVIL;MOTRIN) 200 MG tablet, Take 400 mg by mouth every 6 hours as needed for Pain, Disp: , Rfl:     acetaminophen (TYLENOL) 500 MG tablet, Take 500 mg by mouth every 6 hours as needed for Pain, Disp: , Rfl:     beclomethasone (QVAR) 80 MCG/ACT inhaler, Inhale 2 puffs into the lungs 2 times daily, Disp: 1 Inhaler, Rfl: 3    Handicap Placard MISC, by Does not apply route Duration 5 years, Disp: 1 each, Rfl: 0    losartan (COZAAR) 100 MG tablet, Take 1 tablet by mouth nightly, Disp: 30 tablet, Rfl: 2    metoprolol tartrate (LOPRESSOR) 25 MG tablet, Take 0.5 tablets by mouth 2 times daily, Disp: 60 tablet, Rfl: 3    aspirin 81 MG tablet, Take 81 mg by mouth daily , Disp: , Rfl:     clindamycin (CLEOCIN) 300 MG capsule, Take 300 mg by mouth 3 times daily, Disp: , Rfl:   Pcn [penicillins]  Social History   Substance Use Topics    Smoking status: Current Every Day Smoker     Packs/day: 0.25     Years: 33.00     Types: Cigarettes    Smokeless tobacco: Never Used      Comment: currently 2-3 cigarettes/day, 9/21/18    Alcohol use 3.6 oz/week     6 Cans of beer per week     Family History   Problem Relation Age of Onset    Heart Disease Mother     Other Father     No Known Problems Sister     No Known Problems Brother     No Known Problems Sister     No Known Problems Brother        Review of Systems   Constitutional: Negative for chills and fever. HENT: Negative for ear discharge and hearing loss. Respiratory: Negative for cough and shortness of breath. Cardiovascular: Negative for chest pain and palpitations. Gastrointestinal: Negative for vomiting. Skin: Negative for rash. Allergic/Immunologic: Negative for environmental allergies. Neurological: Negative for dizziness and headaches. Hematological: Does not bruise/bleed easily. All other systems reviewed and are negative.       BP (!) 166/110 (Site: Right Upper Arm, Position: Sitting)   Pulse 86   Ht 6' (1.829 m)   Wt (!) 350 lb (158.8 kg)   SpO2 95%   BMI 47.47 kg/m²   Physical Exam   Constitutional: He is oriented to person, place, and time. He appears well-developed and well-nourished. HENT:   Head: Normocephalic and atraumatic. Right Ear: Hearing, external ear and ear canal normal.   Left Ear: Hearing, external ear and ear canal normal.   Nose: No mucosal edema or rhinorrhea. Right sinus exhibits no maxillary sinus tenderness. Left sinus exhibits no maxillary sinus tenderness. Mouth/Throat: No dental abscesses, lacerations or dental caries. Eyes: Pupils are equal, round, and reactive to light. Conjunctivae and EOM are normal.   Neck: Normal range of motion. Neck supple. Cardiovascular: Normal rate and intact distal pulses. Pulmonary/Chest: Effort normal and breath sounds normal. No respiratory distress. Abdominal: He exhibits no distension. There is no tenderness. There is no guarding. Musculoskeletal: Normal range of motion. Neurological: He is alert and oriented to person, place, and time. Skin: Skin is warm and dry. Psychiatric: He has a normal mood and affect. His behavior is normal. Judgment and thought content normal.   Nursing note and vitals reviewed. 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 Patient will be rescheduled as an outpatient panendoscopy with biopsy, risk and benefits have been reviewed, including bleeding, infection, loss of airway, hoarseness may be permanent, and then again need for future surgery and possible failure of obtaining a biopsy at this point. Patient understands all these risks and is willing to undergo a repeat biopsy. Dr. Aren De Souza.  Otolaryngology Facial Plastic Surgery  :  Susan Weathers Professor:

## 2018-11-01 ENCOUNTER — ANESTHESIA (OUTPATIENT)
Dept: OPERATING ROOM | Age: 53
End: 2018-11-01
Payer: MEDICAID

## 2018-11-01 ENCOUNTER — HOSPITAL ENCOUNTER (OUTPATIENT)
Age: 53
Setting detail: OUTPATIENT SURGERY
Discharge: HOME | End: 2018-11-01
Attending: OTOLARYNGOLOGY | Admitting: OTOLARYNGOLOGY
Payer: MEDICAID

## 2018-11-01 VITALS
OXYGEN SATURATION: 92 % | SYSTOLIC BLOOD PRESSURE: 136 MMHG | BODY MASS INDEX: 42.66 KG/M2 | DIASTOLIC BLOOD PRESSURE: 74 MMHG | RESPIRATION RATE: 17 BRPM | TEMPERATURE: 97.1 F | HEIGHT: 72 IN | WEIGHT: 315 LBS | HEART RATE: 76 BPM

## 2018-11-01 VITALS
DIASTOLIC BLOOD PRESSURE: 67 MMHG | OXYGEN SATURATION: 94 % | SYSTOLIC BLOOD PRESSURE: 112 MMHG | RESPIRATION RATE: 7 BRPM

## 2018-11-01 DIAGNOSIS — G89.18 POST-OP PAIN: Primary | ICD-10-CM

## 2018-11-01 PROCEDURE — 3700000000 HC ANESTHESIA ATTENDED CARE: Performed by: OTOLARYNGOLOGY

## 2018-11-01 PROCEDURE — 6360000002 HC RX W HCPCS: Performed by: ANESTHESIOLOGY

## 2018-11-01 PROCEDURE — 3600000003 HC SURGERY LEVEL 3 BASE: Performed by: OTOLARYNGOLOGY

## 2018-11-01 PROCEDURE — 2580000003 HC RX 258: Performed by: OTOLARYNGOLOGY

## 2018-11-01 PROCEDURE — 2500000003 HC RX 250 WO HCPCS

## 2018-11-01 PROCEDURE — 88342 IMHCHEM/IMCYTCHM 1ST ANTB: CPT

## 2018-11-01 PROCEDURE — 88305 TISSUE EXAM BY PATHOLOGIST: CPT

## 2018-11-01 PROCEDURE — 31536 LARYNGOSCOPY W/BX & OP SCOPE: CPT | Performed by: OTOLARYNGOLOGY

## 2018-11-01 PROCEDURE — 94660 CPAP INITIATION&MGMT: CPT

## 2018-11-01 PROCEDURE — 3700000001 HC ADD 15 MINUTES (ANESTHESIA): Performed by: OTOLARYNGOLOGY

## 2018-11-01 PROCEDURE — 7100000010 HC PHASE II RECOVERY - FIRST 15 MIN: Performed by: OTOLARYNGOLOGY

## 2018-11-01 PROCEDURE — 7100000011 HC PHASE II RECOVERY - ADDTL 15 MIN: Performed by: OTOLARYNGOLOGY

## 2018-11-01 PROCEDURE — 3600000013 HC SURGERY LEVEL 3 ADDTL 15MIN: Performed by: OTOLARYNGOLOGY

## 2018-11-01 PROCEDURE — 2709999900 HC NON-CHARGEABLE SUPPLY: Performed by: OTOLARYNGOLOGY

## 2018-11-01 PROCEDURE — 7100000000 HC PACU RECOVERY - FIRST 15 MIN: Performed by: OTOLARYNGOLOGY

## 2018-11-01 PROCEDURE — 7100000001 HC PACU RECOVERY - ADDTL 15 MIN: Performed by: OTOLARYNGOLOGY

## 2018-11-01 PROCEDURE — 6360000002 HC RX W HCPCS

## 2018-11-01 RX ORDER — GLYCOPYRROLATE 1 MG/5 ML
SYRINGE (ML) INTRAVENOUS PRN
Status: DISCONTINUED | OUTPATIENT
Start: 2018-11-01 | End: 2018-11-01 | Stop reason: SDUPTHER

## 2018-11-01 RX ORDER — IPRATROPIUM BROMIDE AND ALBUTEROL SULFATE 2.5; .5 MG/3ML; MG/3ML
1 SOLUTION RESPIRATORY (INHALATION)
Status: DISCONTINUED | OUTPATIENT
Start: 2018-11-01 | End: 2018-11-01

## 2018-11-01 RX ORDER — HYDROCODONE BITARTRATE AND ACETAMINOPHEN 5; 325 MG/1; MG/1
1 TABLET ORAL EVERY 6 HOURS PRN
Qty: 7 TABLET | Refills: 0 | Status: SHIPPED | OUTPATIENT
Start: 2018-11-01 | End: 2018-11-04

## 2018-11-01 RX ORDER — ONDANSETRON 2 MG/ML
INJECTION INTRAMUSCULAR; INTRAVENOUS PRN
Status: DISCONTINUED | OUTPATIENT
Start: 2018-11-01 | End: 2018-11-01 | Stop reason: SDUPTHER

## 2018-11-01 RX ORDER — FENTANYL CITRATE 50 UG/ML
INJECTION, SOLUTION INTRAMUSCULAR; INTRAVENOUS PRN
Status: DISCONTINUED | OUTPATIENT
Start: 2018-11-01 | End: 2018-11-01 | Stop reason: SDUPTHER

## 2018-11-01 RX ORDER — MIDAZOLAM HYDROCHLORIDE 1 MG/ML
INJECTION INTRAMUSCULAR; INTRAVENOUS PRN
Status: DISCONTINUED | OUTPATIENT
Start: 2018-11-01 | End: 2018-11-01 | Stop reason: SDUPTHER

## 2018-11-01 RX ORDER — LABETALOL HYDROCHLORIDE 5 MG/ML
5 INJECTION, SOLUTION INTRAVENOUS EVERY 10 MIN PRN
Status: DISCONTINUED | OUTPATIENT
Start: 2018-11-01 | End: 2018-11-01 | Stop reason: HOSPADM

## 2018-11-01 RX ORDER — ROCURONIUM BROMIDE 10 MG/ML
INJECTION, SOLUTION INTRAVENOUS PRN
Status: DISCONTINUED | OUTPATIENT
Start: 2018-11-01 | End: 2018-11-01 | Stop reason: SDUPTHER

## 2018-11-01 RX ORDER — PROMETHAZINE HYDROCHLORIDE 25 MG/ML
25 INJECTION, SOLUTION INTRAMUSCULAR; INTRAVENOUS PRN
Status: DISCONTINUED | OUTPATIENT
Start: 2018-11-01 | End: 2018-11-01 | Stop reason: HOSPADM

## 2018-11-01 RX ORDER — SODIUM CHLORIDE 9 MG/ML
INJECTION, SOLUTION INTRAVENOUS CONTINUOUS
Status: DISCONTINUED | OUTPATIENT
Start: 2018-11-01 | End: 2018-11-01 | Stop reason: HOSPADM

## 2018-11-01 RX ORDER — PROPOFOL 10 MG/ML
INJECTION, EMULSION INTRAVENOUS PRN
Status: DISCONTINUED | OUTPATIENT
Start: 2018-11-01 | End: 2018-11-01 | Stop reason: SDUPTHER

## 2018-11-01 RX ORDER — CEFAZOLIN SODIUM 1 G/3ML
INJECTION, POWDER, FOR SOLUTION INTRAMUSCULAR; INTRAVENOUS PRN
Status: DISCONTINUED | OUTPATIENT
Start: 2018-11-01 | End: 2018-11-01 | Stop reason: SDUPTHER

## 2018-11-01 RX ORDER — SODIUM CHLORIDE 0.9 % (FLUSH) 0.9 %
10 SYRINGE (ML) INJECTION EVERY 12 HOURS SCHEDULED
Status: DISCONTINUED | OUTPATIENT
Start: 2018-11-01 | End: 2018-11-01 | Stop reason: HOSPADM

## 2018-11-01 RX ORDER — SODIUM CHLORIDE 0.9 % (FLUSH) 0.9 %
10 SYRINGE (ML) INJECTION PRN
Status: DISCONTINUED | OUTPATIENT
Start: 2018-11-01 | End: 2018-11-01 | Stop reason: HOSPADM

## 2018-11-01 RX ORDER — LABETALOL HYDROCHLORIDE 5 MG/ML
INJECTION, SOLUTION INTRAVENOUS PRN
Status: DISCONTINUED | OUTPATIENT
Start: 2018-11-01 | End: 2018-11-01 | Stop reason: SDUPTHER

## 2018-11-01 RX ORDER — LIDOCAINE HYDROCHLORIDE 20 MG/ML
INJECTION, SOLUTION INFILTRATION; PERINEURAL PRN
Status: DISCONTINUED | OUTPATIENT
Start: 2018-11-01 | End: 2018-11-01 | Stop reason: SDUPTHER

## 2018-11-01 RX ORDER — METHYLPREDNISOLONE 4 MG/1
TABLET ORAL
Qty: 1 KIT | Refills: 0 | Status: SHIPPED | OUTPATIENT
Start: 2018-11-01 | End: 2018-11-20

## 2018-11-01 RX ORDER — NEOSTIGMINE METHYLSULFATE 1 MG/ML
INJECTION, SOLUTION INTRAVENOUS PRN
Status: DISCONTINUED | OUTPATIENT
Start: 2018-11-01 | End: 2018-11-01 | Stop reason: SDUPTHER

## 2018-11-01 RX ORDER — MEPERIDINE HYDROCHLORIDE 50 MG/ML
12.5 INJECTION INTRAMUSCULAR; INTRAVENOUS; SUBCUTANEOUS EVERY 5 MIN PRN
Status: DISCONTINUED | OUTPATIENT
Start: 2018-11-01 | End: 2018-11-01 | Stop reason: HOSPADM

## 2018-11-01 RX ORDER — SUCCINYLCHOLINE/SOD CL,ISO/PF 100 MG/5ML
SYRINGE (ML) INTRAVENOUS PRN
Status: DISCONTINUED | OUTPATIENT
Start: 2018-11-01 | End: 2018-11-01 | Stop reason: SDUPTHER

## 2018-11-01 RX ORDER — DEXAMETHASONE SODIUM PHOSPHATE 10 MG/ML
INJECTION, SOLUTION INTRAMUSCULAR; INTRAVENOUS PRN
Status: DISCONTINUED | OUTPATIENT
Start: 2018-11-01 | End: 2018-11-01 | Stop reason: SDUPTHER

## 2018-11-01 RX ADMIN — FENTANYL CITRATE 100 MCG: 50 INJECTION, SOLUTION INTRAMUSCULAR; INTRAVENOUS at 12:15

## 2018-11-01 RX ADMIN — DEXAMETHASONE SODIUM PHOSPHATE 10 MG: 10 INJECTION INTRAMUSCULAR; INTRAVENOUS at 13:04

## 2018-11-01 RX ADMIN — PROPOFOL 300 MG: 10 INJECTION, EMULSION INTRAVENOUS at 12:15

## 2018-11-01 RX ADMIN — MIDAZOLAM HYDROCHLORIDE 2 MG: 1 INJECTION, SOLUTION INTRAMUSCULAR; INTRAVENOUS at 12:10

## 2018-11-01 RX ADMIN — LIDOCAINE HYDROCHLORIDE 100 MG: 20 INJECTION, SOLUTION INFILTRATION; PERINEURAL at 12:15

## 2018-11-01 RX ADMIN — ONDANSETRON HYDROCHLORIDE 4 MG: 2 INJECTION, SOLUTION INTRAMUSCULAR; INTRAVENOUS at 12:15

## 2018-11-01 RX ADMIN — HYDROMORPHONE HYDROCHLORIDE 0.25 MG: 1 INJECTION, SOLUTION INTRAMUSCULAR; INTRAVENOUS; SUBCUTANEOUS at 14:04

## 2018-11-01 RX ADMIN — SODIUM CHLORIDE: 9 INJECTION, SOLUTION INTRAVENOUS at 12:15

## 2018-11-01 RX ADMIN — Medication 3 MG: at 13:04

## 2018-11-01 RX ADMIN — LABETALOL HYDROCHLORIDE 10 MG: 5 INJECTION, SOLUTION INTRAVENOUS at 13:22

## 2018-11-01 RX ADMIN — Medication 0.2 MG: at 12:15

## 2018-11-01 RX ADMIN — FENTANYL CITRATE 100 MCG: 50 INJECTION, SOLUTION INTRAMUSCULAR; INTRAVENOUS at 12:30

## 2018-11-01 RX ADMIN — CEFAZOLIN 3 MG: 1 INJECTION, POWDER, FOR SOLUTION INTRAVENOUS at 12:25

## 2018-11-01 RX ADMIN — Medication 0.6 MG: at 13:04

## 2018-11-01 RX ADMIN — Medication 180 MG: at 12:15

## 2018-11-01 RX ADMIN — LABETALOL HYDROCHLORIDE 10 MG: 5 INJECTION, SOLUTION INTRAVENOUS at 12:40

## 2018-11-01 RX ADMIN — ROCURONIUM BROMIDE 40 MG: 10 INJECTION INTRAVENOUS at 12:24

## 2018-11-01 RX ADMIN — DEXAMETHASONE SODIUM PHOSPHATE 10 MG: 10 INJECTION INTRAMUSCULAR; INTRAVENOUS at 12:15

## 2018-11-01 ASSESSMENT — PULMONARY FUNCTION TESTS
PIF_VALUE: 31
PIF_VALUE: 30
PIF_VALUE: 30
PIF_VALUE: 29
PIF_VALUE: 30
PIF_VALUE: 29
PIF_VALUE: 31
PIF_VALUE: 0
PIF_VALUE: 31
PIF_VALUE: 30
PIF_VALUE: 29
PIF_VALUE: 31
PIF_VALUE: 31
PIF_VALUE: 1
PIF_VALUE: 31
PIF_VALUE: 30
PIF_VALUE: 31
PIF_VALUE: 32
PIF_VALUE: 29
PIF_VALUE: 28
PIF_VALUE: 30
PIF_VALUE: 0
PIF_VALUE: 31
PIF_VALUE: 43
PIF_VALUE: 31
PIF_VALUE: 1
PIF_VALUE: 30
PIF_VALUE: 30
PIF_VALUE: 31
PIF_VALUE: 1
PIF_VALUE: 30
PIF_VALUE: 29
PIF_VALUE: 31
PIF_VALUE: 30
PIF_VALUE: 29
PIF_VALUE: 28
PIF_VALUE: 29
PIF_VALUE: 10
PIF_VALUE: 35
PIF_VALUE: 5
PIF_VALUE: 31
PIF_VALUE: 31
PIF_VALUE: 30
PIF_VALUE: 32
PIF_VALUE: 29
PIF_VALUE: 30
PIF_VALUE: 31
PIF_VALUE: 31
PIF_VALUE: 29
PIF_VALUE: 0
PIF_VALUE: 0
PIF_VALUE: 32
PIF_VALUE: 32
PIF_VALUE: 31
PIF_VALUE: 29
PIF_VALUE: 30
PIF_VALUE: 40
PIF_VALUE: 36
PIF_VALUE: 8
PIF_VALUE: 0
PIF_VALUE: 27
PIF_VALUE: 29
PIF_VALUE: 30
PIF_VALUE: 30
PIF_VALUE: 29
PIF_VALUE: 28
PIF_VALUE: 28
PIF_VALUE: 27

## 2018-11-01 ASSESSMENT — ENCOUNTER SYMPTOMS: SHORTNESS OF BREATH: 1

## 2018-11-01 ASSESSMENT — PAIN SCALES - GENERAL
PAINLEVEL_OUTOF10: 3
PAINLEVEL_OUTOF10: 0
PAINLEVEL_OUTOF10: 0
PAINLEVEL_OUTOF10: 2
PAINLEVEL_OUTOF10: 2
PAINLEVEL_OUTOF10: 4

## 2018-11-01 ASSESSMENT — PAIN - FUNCTIONAL ASSESSMENT: PAIN_FUNCTIONAL_ASSESSMENT: 0-10

## 2018-11-01 ASSESSMENT — PAIN DESCRIPTION - DESCRIPTORS
DESCRIPTORS: BURNING
DESCRIPTORS: BURNING;NUMBNESS
DESCRIPTORS: BURNING;DISCOMFORT
DESCRIPTORS: ACHING;DISCOMFORT
DESCRIPTORS: BURNING

## 2018-11-01 ASSESSMENT — PAIN DESCRIPTION - PAIN TYPE
TYPE: REFERRED PAIN
TYPE: REFERRED PAIN
TYPE: SURGICAL PAIN;REFERRED PAIN
TYPE: REFERRED PAIN

## 2018-11-01 ASSESSMENT — PAIN DESCRIPTION - LOCATION
LOCATION: EAR;OTHER (COMMENT)
LOCATION: TEETH;OTHER (COMMENT)
LOCATION: OTHER (COMMENT)
LOCATION: OTHER (COMMENT)

## 2018-11-01 ASSESSMENT — PAIN DESCRIPTION - ORIENTATION
ORIENTATION: LEFT
ORIENTATION: LEFT

## 2018-11-01 ASSESSMENT — PAIN DESCRIPTION - FREQUENCY
FREQUENCY: INTERMITTENT

## 2018-11-01 ASSESSMENT — LIFESTYLE VARIABLES: SMOKING_STATUS: 1

## 2018-11-01 NOTE — ANESTHESIA PRE PROCEDURE
Department of Anesthesiology  Preprocedure Note       Name:  Sandie Roche   Age:  48 y.o.  :  1965                                          MRN:  [id number]         Date:  2018      Surgeon: Shubham Jacobs):  Otilio Gustafson DO    Procedure: MICRO LARYNGOSCOPY WITH BIOPSY TRUE VOCAL CORD LESION (N/A )    Medications prior to admission:   Prior to Admission medications    Medication Sig Start Date End Date Taking? Authorizing Provider   clindamycin (CLEOCIN) 300 MG capsule Take 300 mg by mouth 3 times daily    Historical Provider, MD   ibuprofen (ADVIL;MOTRIN) 200 MG tablet Take 400 mg by mouth every 6 hours as needed for Pain    Historical Provider, MD   acetaminophen (TYLENOL) 500 MG tablet Take 500 mg by mouth every 6 hours as needed for Pain    Historical Provider, MD   beclomethasone (QVAR) 80 MCG/ACT inhaler Inhale 2 puffs into the lungs 2 times daily 18   Max Nascimento MD   Handicap Placard MISC by Does not apply route Duration 5 years 18   YUMIKO Salvador - CNP   losartan (COZAAR) 100 MG tablet Take 1 tablet by mouth nightly 7/10/18   Josse Pompa MD   metoprolol tartrate (LOPRESSOR) 25 MG tablet Take 0.5 tablets by mouth 2 times daily 18   Phineas    aspirin 81 MG tablet Take 81 mg by mouth daily     Historical Provider, MD       Current medications:    No current facility-administered medications for this visit. No current outpatient prescriptions on file. Facility-Administered Medications Ordered in Other Visits   Medication Dose Route Frequency Provider Last Rate Last Dose    sodium chloride flush 0.9 % injection 10 mL  10 mL Intravenous 2 times per day Otilio Gustafson DO        sodium chloride flush 0.9 % injection 10 mL  10 mL Intravenous PRN Otilio Gustafson DO           Allergies:     Allergies   Allergen Reactions    Pcn [Penicillins] Other (See Comments)     As a child, does not remembered       Problem List:    Patient Active

## 2018-11-02 NOTE — OP NOTE
[address]                  [address] [facility]  [facility]                                OPERATIVE REPORT    PATIENT NAME: Roxanna Rodríguez                 :        1965  MED REC NO:   [id number]                            ROOM:  ACCOUNT NO:   [de-identified]                           ADMIT DATE: 2018  PROVIDER:     Israel Parks DO    DATE OF PROCEDURE:  2018    PREOPERATIVE DIAGNOSIS:  Left true vocal cord and possible cord lesion. POSTOPERATIVE DIAGNOSIS:  Left true vocal cord and possible cord lesion. OPERATION PERFORMED:  Microlaryngoscopy with biopsy of left true vocal  cord lesion. ANESTHESIA:  Endotracheal, general.    SURGEON:  Elaine Salinas DO    ASSISTANT:  Israel Parks, PGY-2    ESTIMATED BLOOD LOSS:  Less than 5 mL. COMPLICATIONS:  None. SPECIMENS:  Left true vocal cord and anterior commissure lesions,  tissues. INDICATION FOR SURGERY:  The patient is a 61-year-old male with past  history of left true vocal cord lesions as shown on fiberoptic nasal  endoscopy. Biopsy of the lesions were recommended. Risks, benefits,  and alternatives were discussed. Risks including but not limited to,  pain, infection, damage to the surrounding structures, bleeding, loss of  airway, hoarseness, and the need for further surgery. The patient  voiced understanding and elected to proceed. He was seen in  preoperative holding by Dr. Elaine Salinas. All questions were reviewed  and answered. Consent was reviewed and signed. DESCRIPTION OF PROCEDURE:  The patient was brought to the operating  room, placed supine on the operating room table. SCDs were placed. Perioperative antibiotic was administered. A time-out was done and was  agreed by all parties present. General anesthesia was induced without  any complication and the patient was then prepped and draped in usual  sterile fashion.   The Holinger scope was then used

## 2018-11-12 ENCOUNTER — OFFICE VISIT (OUTPATIENT)
Dept: ENT CLINIC | Age: 53
End: 2018-11-12

## 2018-11-12 ENCOUNTER — TELEPHONE (OUTPATIENT)
Dept: ENT CLINIC | Age: 53
End: 2018-11-12

## 2018-11-12 VITALS
HEART RATE: 76 BPM | BODY MASS INDEX: 42.66 KG/M2 | OXYGEN SATURATION: 96 % | SYSTOLIC BLOOD PRESSURE: 167 MMHG | DIASTOLIC BLOOD PRESSURE: 117 MMHG | WEIGHT: 315 LBS | HEIGHT: 72 IN

## 2018-11-12 DIAGNOSIS — J38.3 LESION OF TRUE VOCAL CORD: Primary | ICD-10-CM

## 2018-11-12 PROCEDURE — 99024 POSTOP FOLLOW-UP VISIT: CPT | Performed by: OTOLARYNGOLOGY

## 2018-11-22 PROBLEM — Z72.0 TOBACCO ABUSE: Status: ACTIVE | Noted: 2018-11-22

## 2018-12-04 ASSESSMENT — ENCOUNTER SYMPTOMS
COUGH: 0
SORE THROAT: 0
VOICE CHANGE: 0
RHINORRHEA: 1
VOMITING: 0
SHORTNESS OF BREATH: 0

## 2019-03-08 ENCOUNTER — HOSPITAL ENCOUNTER (OUTPATIENT)
Age: 54
Discharge: HOME | End: 2019-03-10
Payer: MEDICAID

## 2019-03-08 ENCOUNTER — HOSPITAL ENCOUNTER (OUTPATIENT)
Dept: GENERAL RADIOLOGY | Age: 54
Discharge: HOME | End: 2019-03-10
Payer: MEDICAID

## 2019-03-08 DIAGNOSIS — M25.512 ACUTE PAIN OF LEFT SHOULDER: ICD-10-CM

## 2019-03-08 PROCEDURE — 73030 X-RAY EXAM OF SHOULDER: CPT

## 2019-03-13 ENCOUNTER — TELEPHONE (OUTPATIENT)
Dept: ADMINISTRATIVE | Age: 54
End: 2019-03-13

## 2019-03-14 ENCOUNTER — TELEPHONE (OUTPATIENT)
Dept: ADMINISTRATIVE | Age: 54
End: 2019-03-14

## 2019-03-14 NOTE — TELEPHONE ENCOUNTER
Called patient to schedule appointment per referral to Ortho Trauma. He refused. He states he talked to his doctor who reviewed his xray and said it is arthritis. He will continue to treat it the way he has already been treating it.

## 2019-04-01 ENCOUNTER — OFFICE VISIT (OUTPATIENT)
Dept: ENT CLINIC | Age: 54
End: 2019-04-01
Payer: MEDICAID

## 2019-04-01 VITALS
SYSTOLIC BLOOD PRESSURE: 154 MMHG | BODY MASS INDEX: 42.66 KG/M2 | OXYGEN SATURATION: 92 % | HEIGHT: 72 IN | HEART RATE: 77 BPM | DIASTOLIC BLOOD PRESSURE: 104 MMHG | WEIGHT: 315 LBS

## 2019-04-01 DIAGNOSIS — R49.0 HOARSENESS: Primary | ICD-10-CM

## 2019-04-01 PROCEDURE — 4004F PT TOBACCO SCREEN RCVD TLK: CPT | Performed by: OTOLARYNGOLOGY

## 2019-04-01 PROCEDURE — G8598 ASA/ANTIPLAT THER USED: HCPCS | Performed by: OTOLARYNGOLOGY

## 2019-04-01 PROCEDURE — 3017F COLORECTAL CA SCREEN DOC REV: CPT | Performed by: OTOLARYNGOLOGY

## 2019-04-01 PROCEDURE — 99213 OFFICE O/P EST LOW 20 MIN: CPT | Performed by: OTOLARYNGOLOGY

## 2019-04-01 PROCEDURE — G8427 DOCREV CUR MEDS BY ELIG CLIN: HCPCS | Performed by: OTOLARYNGOLOGY

## 2019-04-01 PROCEDURE — G8417 CALC BMI ABV UP PARAM F/U: HCPCS | Performed by: OTOLARYNGOLOGY

## 2019-04-01 PROCEDURE — 31575 DIAGNOSTIC LARYNGOSCOPY: CPT | Performed by: OTOLARYNGOLOGY

## 2019-04-10 RX ORDER — RANITIDINE 300 MG/1
300 TABLET ORAL NIGHTLY
Qty: 30 TABLET | Refills: 3 | Status: SHIPPED | OUTPATIENT
Start: 2019-04-10 | End: 2019-09-12

## 2019-04-10 RX ORDER — AZELASTINE 1 MG/ML
2 SPRAY, METERED NASAL 2 TIMES DAILY
Qty: 1 BOTTLE | Refills: 1 | Status: SHIPPED | OUTPATIENT
Start: 2019-04-10 | End: 2019-09-12

## 2019-04-14 ASSESSMENT — ENCOUNTER SYMPTOMS
COUGH: 0
SHORTNESS OF BREATH: 0
VOMITING: 0

## 2019-04-14 NOTE — PROGRESS NOTES
Disp: 1 Bottle, Rfl: 1    ranitidine (ZANTAC) 300 MG tablet, Take 1 tablet by mouth nightly, Disp: 30 tablet, Rfl: 3    ibuprofen (ADVIL;MOTRIN) 200 MG tablet, Take 400 mg by mouth every 6 hours as needed for Pain, Disp: , Rfl:     acetaminophen (TYLENOL) 500 MG tablet, Take 500 mg by mouth every 6 hours as needed for Pain, Disp: , Rfl:     beclomethasone (QVAR) 80 MCG/ACT inhaler, Inhale 2 puffs into the lungs 2 times daily, Disp: 1 Inhaler, Rfl: 3    Handicap Placard MISC, by Does not apply route Duration 5 years, Disp: 1 each, Rfl: 0    losartan (COZAAR) 100 MG tablet, Take 1 tablet by mouth nightly, Disp: 30 tablet, Rfl: 2    metoprolol tartrate (LOPRESSOR) 25 MG tablet, Take 0.5 tablets by mouth 2 times daily, Disp: 60 tablet, Rfl: 3    aspirin 81 MG tablet, Take 81 mg by mouth daily , Disp: , Rfl:     QVAR REDIHALER 40 MCG/ACT AERB inhaler, INHALE TWO PUFFS BY MOUTH TWO TIMES A DAY, Disp: 10.6 g, Rfl: 1    cloNIDine (CATAPRES) 0.1 MG tablet, Take 1 tablet by mouth 2 times daily, Disp: 60 tablet, Rfl: 3    clindamycin (CLEOCIN) 300 MG capsule, Take 300 mg by mouth 3 times daily, Disp: , Rfl:   Pcn [penicillins]  Social History     Tobacco Use    Smoking status: Current Every Day Smoker     Packs/day: 0.25     Years: 33.00     Pack years: 8.25     Types: Cigarettes    Smokeless tobacco: Never Used    Tobacco comment: currently 2-3 cigarettes/day, 9/21/18   Substance Use Topics    Alcohol use: Yes     Alcohol/week: 3.6 oz     Types: 6 Cans of beer per week     Comment: social    Drug use: Yes     Types: Marijuana     Comment: once a week     Family History   Problem Relation Age of Onset    Heart Disease Mother     Other Father     No Known Problems Sister     No Known Problems Brother     No Known Problems Sister     No Known Problems Brother        Review of Systems   Constitutional: Negative for chills and fever. HENT: Negative for ear discharge and hearing loss.     Respiratory: TVC motion and mucosa, no subglottic masses or lesions      Condition:  Stable    Complications:  None    IMPRESSION/PLAN:  No sign of recurrent disease available records, continue LPR prophylaxis in the form of Zantac 300 mg daily, as well as for postnasal drainage with nasal Astlein. Dr. Katherine Schwarz.  Otolaryngology Facial Plastic Surgery  :  [facility] Otolaryngology/Facial Plastic Surgery Residency  Associate Clinical Professor:  Bing Moya, [facility]

## 2019-09-12 ENCOUNTER — APPOINTMENT (OUTPATIENT)
Dept: CT IMAGING | Age: 54
DRG: 247 | End: 2019-09-12
Payer: COMMERCIAL

## 2019-09-12 ENCOUNTER — HOSPITAL ENCOUNTER (INPATIENT)
Age: 54
LOS: 2 days | Discharge: HOME | DRG: 247 | End: 2019-09-14
Attending: EMERGENCY MEDICINE | Admitting: INTERNAL MEDICINE
Payer: COMMERCIAL

## 2019-09-12 ENCOUNTER — APPOINTMENT (OUTPATIENT)
Dept: GENERAL RADIOLOGY | Age: 54
DRG: 247 | End: 2019-09-12
Payer: COMMERCIAL

## 2019-09-12 DIAGNOSIS — I21.4 NSTEMI (NON-ST ELEVATED MYOCARDIAL INFARCTION) (HCC): Primary | ICD-10-CM

## 2019-09-12 LAB
ABO/RH: NORMAL
ALBUMIN SERPL-MCNC: 4.2 G/DL (ref 3.5–5.2)
ALP BLD-CCNC: 86 U/L (ref 40–129)
ALT SERPL-CCNC: 32 U/L (ref 0–40)
ANION GAP SERPL CALCULATED.3IONS-SCNC: 13 MMOL/L (ref 7–16)
ANTIBODY SCREEN: NORMAL
APTT: 106.1 SEC (ref 24.5–35.1)
APTT: 30.6 SEC (ref 24.5–35.1)
APTT: >240 SEC (ref 24.5–35.1)
AST SERPL-CCNC: 38 U/L (ref 0–39)
BASOPHILS ABSOLUTE: 0.04 E9/L (ref 0–0.2)
BASOPHILS RELATIVE PERCENT: 0.4 % (ref 0–2)
BILIRUB SERPL-MCNC: 0.5 MG/DL (ref 0–1.2)
BUN BLDV-MCNC: 17 MG/DL (ref 6–20)
CALCIUM SERPL-MCNC: 9.4 MG/DL (ref 8.6–10.2)
CHLORIDE BLD-SCNC: 103 MMOL/L (ref 98–107)
CO2: 25 MMOL/L (ref 22–29)
CREAT SERPL-MCNC: 1.1 MG/DL (ref 0.7–1.2)
EOSINOPHILS ABSOLUTE: 0.16 E9/L (ref 0.05–0.5)
EOSINOPHILS RELATIVE PERCENT: 1.7 % (ref 0–6)
GFR AFRICAN AMERICAN: >60
GFR AFRICAN AMERICAN: >60
GFR NON-AFRICAN AMERICAN: >60 ML/MIN/1.73
GFR NON-AFRICAN AMERICAN: >60 ML/MIN/1.73
GLUCOSE BLD-MCNC: 126 MG/DL (ref 74–99)
GLUCOSE BLD-MCNC: 131 MG/DL (ref 74–99)
HCT VFR BLD CALC: 49.7 % (ref 37–54)
HEMOGLOBIN: 16.3 G/DL (ref 12.5–16.5)
IMMATURE GRANULOCYTES #: 0.07 E9/L
IMMATURE GRANULOCYTES %: 0.7 % (ref 0–5)
INR BLD: 1
LIPASE: 42 U/L (ref 13–60)
LYMPHOCYTES ABSOLUTE: 1.59 E9/L (ref 1.5–4)
LYMPHOCYTES RELATIVE PERCENT: 16.7 % (ref 20–42)
MCH RBC QN AUTO: 31.2 PG (ref 26–35)
MCHC RBC AUTO-ENTMCNC: 32.8 % (ref 32–34.5)
MCV RBC AUTO: 95.2 FL (ref 80–99.9)
MONOCYTES ABSOLUTE: 0.73 E9/L (ref 0.1–0.95)
MONOCYTES RELATIVE PERCENT: 7.7 % (ref 2–12)
NEUTROPHILS ABSOLUTE: 6.95 E9/L (ref 1.8–7.3)
NEUTROPHILS RELATIVE PERCENT: 72.8 % (ref 43–80)
PDW BLD-RTO: 13.5 FL (ref 11.5–15)
PERFORMED ON: ABNORMAL
PLATELET # BLD: 195 E9/L (ref 130–450)
PMV BLD AUTO: 9.6 FL (ref 7–12)
POC CHLORIDE: 108 MMOL/L (ref 100–108)
POC CREATININE: 1 MG/DL (ref 0.7–1.2)
POC POTASSIUM: 3.6 MMOL/L (ref 3.5–5)
POC SODIUM: 142 MMOL/L (ref 132–146)
POTASSIUM SERPL-SCNC: 3.8 MMOL/L (ref 3.5–5)
PRO-BNP: 829 PG/ML (ref 0–125)
PROTHROMBIN TIME: 11.6 SEC (ref 9.3–12.4)
RBC # BLD: 5.22 E12/L (ref 3.8–5.8)
SODIUM BLD-SCNC: 141 MMOL/L (ref 132–146)
TOTAL PROTEIN: 7.7 G/DL (ref 6.4–8.3)
TROPONIN: 0.34 NG/ML (ref 0–0.03)
TROPONIN: 0.54 NG/ML (ref 0–0.03)
WBC # BLD: 9.5 E9/L (ref 4.5–11.5)

## 2019-09-12 PROCEDURE — 71275 CT ANGIOGRAPHY CHEST: CPT

## 2019-09-12 PROCEDURE — 2140000000 HC CCU INTERMEDIATE R&B

## 2019-09-12 PROCEDURE — 6360000002 HC RX W HCPCS: Performed by: STUDENT IN AN ORGANIZED HEALTH CARE EDUCATION/TRAINING PROGRAM

## 2019-09-12 PROCEDURE — 86850 RBC ANTIBODY SCREEN: CPT

## 2019-09-12 PROCEDURE — 85610 PROTHROMBIN TIME: CPT

## 2019-09-12 PROCEDURE — 82947 ASSAY GLUCOSE BLOOD QUANT: CPT

## 2019-09-12 PROCEDURE — 6360000002 HC RX W HCPCS: Performed by: INTERNAL MEDICINE

## 2019-09-12 PROCEDURE — 96374 THER/PROPH/DIAG INJ IV PUSH: CPT

## 2019-09-12 PROCEDURE — 82565 ASSAY OF CREATININE: CPT

## 2019-09-12 PROCEDURE — 6370000000 HC RX 637 (ALT 250 FOR IP): Performed by: STUDENT IN AN ORGANIZED HEALTH CARE EDUCATION/TRAINING PROGRAM

## 2019-09-12 PROCEDURE — 94761 N-INVAS EAR/PLS OXIMETRY MLT: CPT

## 2019-09-12 PROCEDURE — 36415 COLL VENOUS BLD VENIPUNCTURE: CPT

## 2019-09-12 PROCEDURE — 82435 ASSAY OF BLOOD CHLORIDE: CPT

## 2019-09-12 PROCEDURE — 83880 ASSAY OF NATRIURETIC PEPTIDE: CPT

## 2019-09-12 PROCEDURE — 84484 ASSAY OF TROPONIN QUANT: CPT

## 2019-09-12 PROCEDURE — 86900 BLOOD TYPING SEROLOGIC ABO: CPT

## 2019-09-12 PROCEDURE — 2500000003 HC RX 250 WO HCPCS: Performed by: STUDENT IN AN ORGANIZED HEALTH CARE EDUCATION/TRAINING PROGRAM

## 2019-09-12 PROCEDURE — 96375 TX/PRO/DX INJ NEW DRUG ADDON: CPT

## 2019-09-12 PROCEDURE — 2580000003 HC RX 258: Performed by: INTERNAL MEDICINE

## 2019-09-12 PROCEDURE — 85025 COMPLETE CBC W/AUTO DIFF WBC: CPT

## 2019-09-12 PROCEDURE — 93005 ELECTROCARDIOGRAM TRACING: CPT | Performed by: STUDENT IN AN ORGANIZED HEALTH CARE EDUCATION/TRAINING PROGRAM

## 2019-09-12 PROCEDURE — 2580000003 HC RX 258: Performed by: RADIOLOGY

## 2019-09-12 PROCEDURE — 84132 ASSAY OF SERUM POTASSIUM: CPT

## 2019-09-12 PROCEDURE — 6360000004 HC RX CONTRAST MEDICATION: Performed by: RADIOLOGY

## 2019-09-12 PROCEDURE — 84295 ASSAY OF SERUM SODIUM: CPT

## 2019-09-12 PROCEDURE — 83690 ASSAY OF LIPASE: CPT

## 2019-09-12 PROCEDURE — 71045 X-RAY EXAM CHEST 1 VIEW: CPT

## 2019-09-12 PROCEDURE — 85730 THROMBOPLASTIN TIME PARTIAL: CPT

## 2019-09-12 PROCEDURE — 86901 BLOOD TYPING SEROLOGIC RH(D): CPT

## 2019-09-12 PROCEDURE — 6370000000 HC RX 637 (ALT 250 FOR IP): Performed by: INTERNAL MEDICINE

## 2019-09-12 PROCEDURE — 94660 CPAP INITIATION&MGMT: CPT

## 2019-09-12 PROCEDURE — 83735 ASSAY OF MAGNESIUM: CPT

## 2019-09-12 PROCEDURE — 80053 COMPREHEN METABOLIC PANEL: CPT

## 2019-09-12 PROCEDURE — 99285 EMERGENCY DEPT VISIT HI MDM: CPT

## 2019-09-12 RX ORDER — LOSARTAN POTASSIUM 50 MG/1
100 TABLET ORAL NIGHTLY
Status: DISCONTINUED | OUTPATIENT
Start: 2019-09-12 | End: 2019-09-14 | Stop reason: HOSPADM

## 2019-09-12 RX ORDER — HEPARIN SODIUM 1000 [USP'U]/ML
2000 INJECTION, SOLUTION INTRAVENOUS; SUBCUTANEOUS PRN
Status: DISCONTINUED | OUTPATIENT
Start: 2019-09-12 | End: 2019-09-14

## 2019-09-12 RX ORDER — HEPARIN SODIUM 10000 [USP'U]/100ML
1000 INJECTION, SOLUTION INTRAVENOUS CONTINUOUS
Status: DISCONTINUED | OUTPATIENT
Start: 2019-09-12 | End: 2019-09-14

## 2019-09-12 RX ORDER — SODIUM CHLORIDE 0.9 % (FLUSH) 0.9 %
10 SYRINGE (ML) INJECTION ONCE
Status: COMPLETED | OUTPATIENT
Start: 2019-09-12 | End: 2019-09-12

## 2019-09-12 RX ORDER — NITROGLYCERIN 20 MG/100ML
5 INJECTION INTRAVENOUS CONTINUOUS
Status: DISCONTINUED | OUTPATIENT
Start: 2019-09-12 | End: 2019-09-14

## 2019-09-12 RX ORDER — ONDANSETRON 2 MG/ML
4 INJECTION INTRAMUSCULAR; INTRAVENOUS EVERY 6 HOURS PRN
Status: DISCONTINUED | OUTPATIENT
Start: 2019-09-12 | End: 2019-09-14

## 2019-09-12 RX ORDER — SODIUM CHLORIDE 0.9 % (FLUSH) 0.9 %
10 SYRINGE (ML) INJECTION EVERY 12 HOURS SCHEDULED
Status: DISCONTINUED | OUTPATIENT
Start: 2019-09-12 | End: 2019-09-14 | Stop reason: HOSPADM

## 2019-09-12 RX ORDER — HEPARIN SODIUM 1000 [USP'U]/ML
10000 INJECTION, SOLUTION INTRAVENOUS; SUBCUTANEOUS ONCE
Status: COMPLETED | OUTPATIENT
Start: 2019-09-12 | End: 2019-09-12

## 2019-09-12 RX ORDER — SODIUM CHLORIDE 0.9 % (FLUSH) 0.9 %
10 SYRINGE (ML) INJECTION PRN
Status: DISCONTINUED | OUTPATIENT
Start: 2019-09-12 | End: 2019-09-14

## 2019-09-12 RX ORDER — ASPIRIN 81 MG/1
81 TABLET, CHEWABLE ORAL DAILY
Status: DISCONTINUED | OUTPATIENT
Start: 2019-09-13 | End: 2019-09-14 | Stop reason: HOSPADM

## 2019-09-12 RX ORDER — HEPARIN SODIUM 1000 [USP'U]/ML
4000 INJECTION, SOLUTION INTRAVENOUS; SUBCUTANEOUS PRN
Status: DISCONTINUED | OUTPATIENT
Start: 2019-09-12 | End: 2019-09-14

## 2019-09-12 RX ORDER — CLONIDINE HYDROCHLORIDE 0.1 MG/1
0.1 TABLET ORAL 2 TIMES DAILY
Status: DISCONTINUED | OUTPATIENT
Start: 2019-09-12 | End: 2019-09-13

## 2019-09-12 RX ORDER — ACETAMINOPHEN 325 MG/1
650 TABLET ORAL EVERY 4 HOURS PRN
Status: DISCONTINUED | OUTPATIENT
Start: 2019-09-12 | End: 2019-09-13 | Stop reason: SDUPTHER

## 2019-09-12 RX ORDER — HEPARIN SODIUM 1000 [USP'U]/ML
4000 INJECTION, SOLUTION INTRAVENOUS; SUBCUTANEOUS ONCE
Status: DISCONTINUED | OUTPATIENT
Start: 2019-09-12 | End: 2019-09-12 | Stop reason: SDUPTHER

## 2019-09-12 RX ORDER — FENTANYL CITRATE 50 UG/ML
75 INJECTION, SOLUTION INTRAMUSCULAR; INTRAVENOUS ONCE
Status: COMPLETED | OUTPATIENT
Start: 2019-09-12 | End: 2019-09-12

## 2019-09-12 RX ORDER — HEPARIN SODIUM 1000 [USP'U]/ML
2000 INJECTION, SOLUTION INTRAVENOUS; SUBCUTANEOUS PRN
Status: DISCONTINUED | OUTPATIENT
Start: 2019-09-12 | End: 2019-09-12 | Stop reason: SDUPTHER

## 2019-09-12 RX ORDER — HEPARIN SODIUM 1000 [USP'U]/ML
4000 INJECTION, SOLUTION INTRAVENOUS; SUBCUTANEOUS PRN
Status: DISCONTINUED | OUTPATIENT
Start: 2019-09-12 | End: 2019-09-12 | Stop reason: SDUPTHER

## 2019-09-12 RX ORDER — HEPARIN SODIUM 10000 [USP'U]/100ML
6 INJECTION, SOLUTION INTRAVENOUS CONTINUOUS
Status: DISCONTINUED | OUTPATIENT
Start: 2019-09-12 | End: 2019-09-12 | Stop reason: SDUPTHER

## 2019-09-12 RX ORDER — ESCITALOPRAM OXALATE 10 MG/1
10 TABLET ORAL DAILY
Status: DISCONTINUED | OUTPATIENT
Start: 2019-09-13 | End: 2019-09-14 | Stop reason: HOSPADM

## 2019-09-12 RX ORDER — ATORVASTATIN CALCIUM 40 MG/1
40 TABLET, FILM COATED ORAL NIGHTLY
Status: DISCONTINUED | OUTPATIENT
Start: 2019-09-12 | End: 2019-09-14 | Stop reason: HOSPADM

## 2019-09-12 RX ADMIN — NITROGLYCERIN 1 INCH: 20 OINTMENT TOPICAL at 15:59

## 2019-09-12 RX ADMIN — Medication 10 ML: at 22:31

## 2019-09-12 RX ADMIN — Medication 10 ML: at 22:30

## 2019-09-12 RX ADMIN — HEPARIN SODIUM 670 UNITS/HR: 10000 INJECTION, SOLUTION INTRAVENOUS at 22:39

## 2019-09-12 RX ADMIN — CLONIDINE HYDROCHLORIDE 0.1 MG: 0.1 TABLET ORAL at 22:30

## 2019-09-12 RX ADMIN — ATORVASTATIN CALCIUM 40 MG: 40 TABLET, FILM COATED ORAL at 22:30

## 2019-09-12 RX ADMIN — IOPAMIDOL 90 ML: 755 INJECTION, SOLUTION INTRAVENOUS at 15:16

## 2019-09-12 RX ADMIN — HEPARIN SODIUM 10000 UNITS: 1000 INJECTION INTRAVENOUS; SUBCUTANEOUS at 16:58

## 2019-09-12 RX ADMIN — NITROGLYCERIN 5 MCG/MIN: 20 INJECTION INTRAVENOUS at 17:09

## 2019-09-12 RX ADMIN — ONDANSETRON HYDROCHLORIDE 4 MG: 2 SOLUTION INTRAMUSCULAR; INTRAVENOUS at 22:30

## 2019-09-12 RX ADMIN — ACETAMINOPHEN 650 MG: 325 TABLET ORAL at 22:29

## 2019-09-12 RX ADMIN — NITROGLYCERIN 15 MCG/MIN: 20 INJECTION INTRAVENOUS at 18:06

## 2019-09-12 RX ADMIN — Medication 10 ML: at 15:16

## 2019-09-12 RX ADMIN — HEPARIN SODIUM 1000 UNITS/HR: 10000 INJECTION, SOLUTION INTRAVENOUS at 17:00

## 2019-09-12 RX ADMIN — NITROGLYCERIN 10 MCG/MIN: 20 INJECTION INTRAVENOUS at 17:38

## 2019-09-12 RX ADMIN — FENTANYL CITRATE 75 MCG: 50 INJECTION INTRAMUSCULAR; INTRAVENOUS at 16:56

## 2019-09-12 ASSESSMENT — ENCOUNTER SYMPTOMS
BACK PAIN: 1
VOMITING: 0
EYE REDNESS: 0
COUGH: 0
NAUSEA: 0
WHEEZING: 0
EYE PAIN: 0
ABDOMINAL PAIN: 0
EYE DISCHARGE: 0
SINUS PRESSURE: 0
SORE THROAT: 0
SHORTNESS OF BREATH: 1
DIARRHEA: 0

## 2019-09-12 ASSESSMENT — PAIN DESCRIPTION - ORIENTATION: ORIENTATION: LEFT;MID

## 2019-09-12 ASSESSMENT — PAIN DESCRIPTION - FREQUENCY: FREQUENCY: CONTINUOUS

## 2019-09-12 ASSESSMENT — PAIN SCALES - GENERAL
PAINLEVEL_OUTOF10: 8
PAINLEVEL_OUTOF10: 7
PAINLEVEL_OUTOF10: 2
PAINLEVEL_OUTOF10: 5

## 2019-09-12 ASSESSMENT — PAIN DESCRIPTION - ONSET: ONSET: GRADUAL

## 2019-09-12 ASSESSMENT — PAIN DESCRIPTION - LOCATION
LOCATION: SHOULDER;BACK
LOCATION: HEAD

## 2019-09-12 ASSESSMENT — PAIN DESCRIPTION - DESCRIPTORS: DESCRIPTORS: ACHING;HEADACHE;DISCOMFORT

## 2019-09-12 ASSESSMENT — PAIN DESCRIPTION - PAIN TYPE: TYPE: ACUTE PAIN

## 2019-09-12 NOTE — ED NOTES
APTT redrawn because the initial result was 30.6; the second result was >240. Heparin gtt paused for 10 minutes and lab was drawn from opposite arm. Discussed this with the nurse that the patient will be going to on the 6th floor. Kalyani Blanco  09/12/19 [id number]

## 2019-09-12 NOTE — ED PROVIDER NOTES
Value Ref Range    Lipase 42 13 - 60 U/L   POCT Venous   Result Value Ref Range    POC Sodium 142 132 - 146 mmol/L    POC Potassium 3.6 3.5 - 5.0 mmol/L    POC Chloride 108 100 - 108 mmol/L    POC Glucose 131 (H) 74 - 99 mg/dl    POC Creatinine 1.0 0.7 - 1.2 mg/dL    GFR Non-African American >60 >=60 mL/min/1.73    GFR  >60     Performed on SEE BELOW        Radiology  CTA CHEST W CONTRAST   Final Result      1. No evidence of thoracic aortic dissection. 2. Stable ectatic ascending thoracic aorta measuring up to 3.8 cm. 3. No evidence of pneumonia or pleural effusion. XR CHEST PORTABLE   Final Result      No airspace opacities or pleural effusion.                ------------------------- NURSING NOTES AND VITALS REVIEWED ---------------------------  Date / Time Roomed:  9/12/2019  2:42 PM  ED Bed Assignment:  13/13    The nursing notes within the ED encounter and vital signs as below have been reviewed. Patient Vitals for the past 24 hrs:   BP Temp Temp src Pulse Resp SpO2 Height Weight   09/12/19 1444 (!) 195/134 97.8 °F (36.6 °C) Infrared 86 20 94 % 6' (1.829 m) (!) 367 lb (166.5 kg)       Oxygen Saturation Interpretation: Normal      ------------------------------------------ PROGRESS NOTES ------------------------------------------  Re-evaluation(s):    See above    I have spoken with the patient and discussed todays results, in addition to providing specific details for the plan of care and counseling regarding the diagnosis and prognosis. Their questions are answered at this time and they are agreeable with the plan.      --------------------------------- ADDITIONAL PROVIDER NOTES ---------------------------------  Consultations:  Spoke with Dr. Sallyann Sandhoff,  They will admit this patient. Spoke with Dr. Gee Holt, he will provide consultation.      This patient's ED course included: a personal history and physicial examination, re-evaluation prior to disposition, multiple

## 2019-09-13 LAB
APTT: 34.8 SEC (ref 24.5–35.1)
APTT: 34.8 SEC (ref 24.5–35.1)
CHOLESTEROL, TOTAL: 211 MG/DL (ref 0–199)
EKG ATRIAL RATE: 67 BPM
EKG ATRIAL RATE: 70 BPM
EKG ATRIAL RATE: 73 BPM
EKG ATRIAL RATE: 76 BPM
EKG ATRIAL RATE: 76 BPM
EKG P AXIS: 29 DEGREES
EKG P AXIS: 29 DEGREES
EKG P AXIS: 30 DEGREES
EKG P AXIS: 31 DEGREES
EKG P AXIS: 47 DEGREES
EKG P-R INTERVAL: 156 MS
EKG P-R INTERVAL: 158 MS
EKG P-R INTERVAL: 160 MS
EKG P-R INTERVAL: 160 MS
EKG P-R INTERVAL: 164 MS
EKG Q-T INTERVAL: 392 MS
EKG Q-T INTERVAL: 394 MS
EKG Q-T INTERVAL: 426 MS
EKG Q-T INTERVAL: 428 MS
EKG Q-T INTERVAL: 436 MS
EKG QRS DURATION: 86 MS
EKG QRS DURATION: 88 MS
EKG QRS DURATION: 88 MS
EKG QRS DURATION: 90 MS
EKG QRS DURATION: 92 MS
EKG QTC CALCULATION (BAZETT): 441 MS
EKG QTC CALCULATION (BAZETT): 443 MS
EKG QTC CALCULATION (BAZETT): 460 MS
EKG QTC CALCULATION (BAZETT): 462 MS
EKG QTC CALCULATION (BAZETT): 469 MS
EKG R AXIS: -10 DEGREES
EKG R AXIS: -18 DEGREES
EKG R AXIS: -23 DEGREES
EKG R AXIS: -26 DEGREES
EKG R AXIS: -29 DEGREES
EKG T AXIS: 117 DEGREES
EKG T AXIS: 123 DEGREES
EKG T AXIS: 131 DEGREES
EKG T AXIS: 137 DEGREES
EKG T AXIS: 139 DEGREES
EKG VENTRICULAR RATE: 67 BPM
EKG VENTRICULAR RATE: 70 BPM
EKG VENTRICULAR RATE: 73 BPM
EKG VENTRICULAR RATE: 76 BPM
EKG VENTRICULAR RATE: 76 BPM
HCT VFR BLD CALC: 45.3 % (ref 37–54)
HDLC SERPL-MCNC: 29 MG/DL
HEMOGLOBIN: 14.6 G/DL (ref 12.5–16.5)
LDL CHOLESTEROL CALCULATED: 126 MG/DL (ref 0–99)
MAGNESIUM: 2.1 MG/DL (ref 1.6–2.6)
MAGNESIUM: 2.2 MG/DL (ref 1.6–2.6)
MCH RBC QN AUTO: 31.1 PG (ref 26–35)
MCHC RBC AUTO-ENTMCNC: 32.2 % (ref 32–34.5)
MCV RBC AUTO: 96.4 FL (ref 80–99.9)
METER GLUCOSE: 154 MG/DL (ref 74–99)
PDW BLD-RTO: 13.5 FL (ref 11.5–15)
PLATELET # BLD: 181 E9/L (ref 130–450)
PMV BLD AUTO: 10 FL (ref 7–12)
POC ACT LR: 106 SECONDS
POC ACT LR: 381 SECONDS
RBC # BLD: 4.7 E12/L (ref 3.8–5.8)
TRIGL SERPL-MCNC: 279 MG/DL (ref 0–149)
TROPONIN: 0.64 NG/ML (ref 0–0.03)
VLDLC SERPL CALC-MCNC: 56 MG/DL
WBC # BLD: 10 E9/L (ref 4.5–11.5)

## 2019-09-13 PROCEDURE — 85347 COAGULATION TIME ACTIVATED: CPT

## 2019-09-13 PROCEDURE — 6360000002 HC RX W HCPCS

## 2019-09-13 PROCEDURE — 93010 ELECTROCARDIOGRAM REPORT: CPT | Performed by: INTERNAL MEDICINE

## 2019-09-13 PROCEDURE — 80061 LIPID PANEL: CPT

## 2019-09-13 PROCEDURE — 6360000002 HC RX W HCPCS: Performed by: INTERNAL MEDICINE

## 2019-09-13 PROCEDURE — C1769 GUIDE WIRE: HCPCS

## 2019-09-13 PROCEDURE — 84484 ASSAY OF TROPONIN QUANT: CPT

## 2019-09-13 PROCEDURE — 6360000002 HC RX W HCPCS: Performed by: STUDENT IN AN ORGANIZED HEALTH CARE EDUCATION/TRAINING PROGRAM

## 2019-09-13 PROCEDURE — 2500000003 HC RX 250 WO HCPCS

## 2019-09-13 PROCEDURE — 94660 CPAP INITIATION&MGMT: CPT

## 2019-09-13 PROCEDURE — 93005 ELECTROCARDIOGRAM TRACING: CPT | Performed by: INTERNAL MEDICINE

## 2019-09-13 PROCEDURE — 85730 THROMBOPLASTIN TIME PARTIAL: CPT

## 2019-09-13 PROCEDURE — 85027 COMPLETE CBC AUTOMATED: CPT

## 2019-09-13 PROCEDURE — 36415 COLL VENOUS BLD VENIPUNCTURE: CPT

## 2019-09-13 PROCEDURE — C1753 CATH, INTRAVAS ULTRASOUND: HCPCS

## 2019-09-13 PROCEDURE — C1887 CATHETER, GUIDING: HCPCS

## 2019-09-13 PROCEDURE — 2580000003 HC RX 258: Performed by: INTERNAL MEDICINE

## 2019-09-13 PROCEDURE — 027035Z DILATION OF CORONARY ARTERY, ONE ARTERY WITH TWO DRUG-ELUTING INTRALUMINAL DEVICES, PERCUTANEOUS APPROACH: ICD-10-PCS | Performed by: INTERNAL MEDICINE

## 2019-09-13 PROCEDURE — C9600 PERC DRUG-EL COR STENT SING: HCPCS

## 2019-09-13 PROCEDURE — 99222 1ST HOSP IP/OBS MODERATE 55: CPT | Performed by: INTERNAL MEDICINE

## 2019-09-13 PROCEDURE — 93458 L HRT ARTERY/VENTRICLE ANGIO: CPT

## 2019-09-13 PROCEDURE — C1725 CATH, TRANSLUMIN NON-LASER: HCPCS

## 2019-09-13 PROCEDURE — 83735 ASSAY OF MAGNESIUM: CPT

## 2019-09-13 PROCEDURE — 6370000000 HC RX 637 (ALT 250 FOR IP): Performed by: INTERNAL MEDICINE

## 2019-09-13 PROCEDURE — B2151ZZ FLUOROSCOPY OF LEFT HEART USING LOW OSMOLAR CONTRAST: ICD-10-PCS | Performed by: INTERNAL MEDICINE

## 2019-09-13 PROCEDURE — 6370000000 HC RX 637 (ALT 250 FOR IP)

## 2019-09-13 PROCEDURE — C1894 INTRO/SHEATH, NON-LASER: HCPCS

## 2019-09-13 PROCEDURE — B2111ZZ FLUOROSCOPY OF MULTIPLE CORONARY ARTERIES USING LOW OSMOLAR CONTRAST: ICD-10-PCS | Performed by: INTERNAL MEDICINE

## 2019-09-13 PROCEDURE — 82962 GLUCOSE BLOOD TEST: CPT

## 2019-09-13 PROCEDURE — C1874 STENT, COATED/COV W/DEL SYS: HCPCS

## 2019-09-13 PROCEDURE — 4A023N7 MEASUREMENT OF CARDIAC SAMPLING AND PRESSURE, LEFT HEART, PERCUTANEOUS APPROACH: ICD-10-PCS | Performed by: INTERNAL MEDICINE

## 2019-09-13 PROCEDURE — 2709999900 HC NON-CHARGEABLE SUPPLY

## 2019-09-13 PROCEDURE — 2140000000 HC CCU INTERMEDIATE R&B

## 2019-09-13 RX ORDER — SODIUM CHLORIDE 0.9 % (FLUSH) 0.9 %
10 SYRINGE (ML) INJECTION EVERY 12 HOURS SCHEDULED
Status: DISCONTINUED | OUTPATIENT
Start: 2019-09-13 | End: 2019-09-14 | Stop reason: HOSPADM

## 2019-09-13 RX ORDER — FAMOTIDINE 20 MG/1
20 TABLET, FILM COATED ORAL 2 TIMES DAILY
Status: DISCONTINUED | OUTPATIENT
Start: 2019-09-13 | End: 2019-09-14 | Stop reason: HOSPADM

## 2019-09-13 RX ORDER — SODIUM CHLORIDE 0.9 % (FLUSH) 0.9 %
10 SYRINGE (ML) INJECTION PRN
Status: DISCONTINUED | OUTPATIENT
Start: 2019-09-13 | End: 2019-09-14

## 2019-09-13 RX ORDER — SODIUM CHLORIDE 9 MG/ML
INJECTION, SOLUTION INTRAVENOUS ONCE
Status: COMPLETED | OUTPATIENT
Start: 2019-09-13 | End: 2019-09-13

## 2019-09-13 RX ORDER — METOPROLOL SUCCINATE 25 MG/1
25 TABLET, EXTENDED RELEASE ORAL 2 TIMES DAILY
Status: DISCONTINUED | OUTPATIENT
Start: 2019-09-13 | End: 2019-09-14

## 2019-09-13 RX ORDER — ACETAMINOPHEN 325 MG/1
650 TABLET ORAL EVERY 4 HOURS PRN
Status: DISCONTINUED | OUTPATIENT
Start: 2019-09-13 | End: 2019-09-14

## 2019-09-13 RX ORDER — SODIUM CHLORIDE 9 MG/ML
INJECTION, SOLUTION INTRAVENOUS CONTINUOUS
Status: DISCONTINUED | OUTPATIENT
Start: 2019-09-13 | End: 2019-09-14

## 2019-09-13 RX ORDER — METOPROLOL SUCCINATE 25 MG/1
25 TABLET, EXTENDED RELEASE ORAL DAILY
Status: DISCONTINUED | OUTPATIENT
Start: 2019-09-13 | End: 2019-09-13

## 2019-09-13 RX ORDER — MORPHINE SULFATE 2 MG/ML
2 INJECTION, SOLUTION INTRAMUSCULAR; INTRAVENOUS EVERY 4 HOURS PRN
Status: COMPLETED | OUTPATIENT
Start: 2019-09-13 | End: 2019-09-13

## 2019-09-13 RX ADMIN — Medication 10 ML: at 06:29

## 2019-09-13 RX ADMIN — ACETAMINOPHEN 650 MG: 325 TABLET ORAL at 03:49

## 2019-09-13 RX ADMIN — MORPHINE SULFATE 2 MG: 2 INJECTION, SOLUTION INTRAMUSCULAR; INTRAVENOUS at 06:29

## 2019-09-13 RX ADMIN — SODIUM CHLORIDE: 9 INJECTION, SOLUTION INTRAVENOUS at 11:57

## 2019-09-13 RX ADMIN — METOPROLOL SUCCINATE 25 MG: 25 TABLET, EXTENDED RELEASE ORAL at 21:08

## 2019-09-13 RX ADMIN — ESCITALOPRAM 10 MG: 10 TABLET, FILM COATED ORAL at 11:54

## 2019-09-13 RX ADMIN — ATORVASTATIN CALCIUM 40 MG: 40 TABLET, FILM COATED ORAL at 21:07

## 2019-09-13 RX ADMIN — FAMOTIDINE 20 MG: 20 TABLET ORAL at 13:09

## 2019-09-13 RX ADMIN — TICAGRELOR 90 MG: 90 TABLET ORAL at 21:08

## 2019-09-13 RX ADMIN — LOSARTAN POTASSIUM 100 MG: 50 TABLET, FILM COATED ORAL at 00:27

## 2019-09-13 RX ADMIN — HEPARIN SODIUM 6 UNITS/KG/HR: 10000 INJECTION, SOLUTION INTRAVENOUS at 02:08

## 2019-09-13 RX ADMIN — FAMOTIDINE 20 MG: 20 TABLET ORAL at 21:07

## 2019-09-13 RX ADMIN — LOSARTAN POTASSIUM 100 MG: 50 TABLET, FILM COATED ORAL at 21:07

## 2019-09-13 RX ADMIN — Medication 10 ML: at 21:08

## 2019-09-13 RX ADMIN — SODIUM CHLORIDE: 9 INJECTION, SOLUTION INTRAVENOUS at 08:48

## 2019-09-13 RX ADMIN — HEPARIN SODIUM 2000 UNITS: 1000 INJECTION, SOLUTION INTRAVENOUS; SUBCUTANEOUS at 02:07

## 2019-09-13 RX ADMIN — METOPROLOL SUCCINATE 25 MG: 25 TABLET, EXTENDED RELEASE ORAL at 11:55

## 2019-09-13 RX ADMIN — METOPROLOL SUCCINATE 25 MG: 25 TABLET, EXTENDED RELEASE ORAL at 00:27

## 2019-09-13 ASSESSMENT — PAIN - FUNCTIONAL ASSESSMENT: PAIN_FUNCTIONAL_ASSESSMENT: ACTIVITIES ARE NOT PREVENTED

## 2019-09-13 ASSESSMENT — PAIN SCALES - GENERAL
PAINLEVEL_OUTOF10: 2
PAINLEVEL_OUTOF10: 10
PAINLEVEL_OUTOF10: 0
PAINLEVEL_OUTOF10: 7
PAINLEVEL_OUTOF10: 0
PAINLEVEL_OUTOF10: 0

## 2019-09-13 ASSESSMENT — PAIN DESCRIPTION - PAIN TYPE: TYPE: ACUTE PAIN

## 2019-09-13 ASSESSMENT — PAIN DESCRIPTION - LOCATION: LOCATION: HEAD

## 2019-09-13 ASSESSMENT — PAIN DESCRIPTION - FREQUENCY: FREQUENCY: CONTINUOUS

## 2019-09-13 ASSESSMENT — PAIN DESCRIPTION - PROGRESSION: CLINICAL_PROGRESSION: GRADUALLY WORSENING

## 2019-09-13 ASSESSMENT — PAIN DESCRIPTION - ONSET: ONSET: ON-GOING

## 2019-09-13 NOTE — CONSULTS
Reason for consult:     HPI: Linda Meza is a 48 y.o. male with PMH of morbid obesity, hypertension, hyperlipidemia, obstructive sleep apnea, chronic kidney disease, TIA, mild systolic dysfunction, post left heart catheterization in March 2017 with no significant CAD , post right heart catheterization in April 2018. Patient is followed up by Dr. Terrye Carrel and was seen by Dr. Sarah Alatorre. He was admitted yesterday due to chest discomfort. The discomfort occurred at rest and was described as heaviness which radiated to his left arm and back. The discomfort was rated as 9/10 intensity and was associated with shortness of breath and diaphoresis. Patient has been complaining of chronic dyspnea on exertion. He denies lower extremity edema. Telemetry revealed 2 episodes of nonsustained ventricular tachycardia  of 12 beats and  32 beats. She denied syncope. EKG on admission showed rhythm at 76 bpm, left fascicular anterior block, poor R wave progression, PVCs, T wave inversion in lead I, aVL, V5 and V6, incomplete right bundle branch block, left ventricular hypertrophy with strain cannot exclude ischemia.     Past Medical History:   Diagnosis Date    Accelerated hypertension 5/5/2017    Arthritis     CAD (coronary artery disease)     Cerebral artery occlusion with cerebral infarction (HCC)     TIA    CHF (congestive heart failure) (HCC)     COPD [facility]ronic obstructive pulmonary disease) ([facility] [address]     Depression with anxiety     Fibromyalgia     GI bleeding     Hyperlipidemia     Hypertension     Myocardial infarct ([facility] [address]     in past, per testing, date unknown    Sleep apnea     cpap    Urinary frequency        Past Surgical History:   Procedure Laterality Date    CARDIAC CATHETERIZATION  03/16/2017    Dr Skyla Lopez  04/12/2018    Dr. Gary Bernal COLONOSCOPY  08/18/2017    CORONARY ANGIOPLASTY WITH STENT PLACEMENT  09/13/2019    rca distal 4.5x 30    ENDOSCOPY,

## 2019-09-13 NOTE — PROGRESS NOTES
Pt complaining of severe shoulder and left sided chest pain. EKG obtained. Nitro drip increased and Dr. Piter Hicks notified. Order received for Morphine for pain, and to keep patient NPO.   VSS. Pt states pain is beginning to diminish. Will continue to monitor closely.

## 2019-09-13 NOTE — PROCEDURES
[address]                  [address] [facility]  [facility]                            CARDIAC CATHETERIZATION    PATIENT NAME: Alessandro Joya                 :        1965  MED REC NO:   [id number]                            ROOM:       6507  ACCOUNT NO:   [de-identified]                           ADMIT DATE: 2019  PROVIDER:     Kathia Dominguez MD    DATE OF PROCEDURE:  2019    LEFT HEART CATHETERIZATION REPORT AND CORONARY INTERVENTION NOTE    INDICATION:  Non-ST-elevation myocardial infarction and nonsustained VT. PROCEDURE NOTE:  The patient was brought to the cardiac cath lab in his  usual fasting state. Under sterile condition and under local anesthetic  and using conscious sedation, a 6-Tuvaluan Terumo Slender sheath was  inserted into his right radial artery. His ACT was 381. He received  300 mcg of intraarterial nitroglycerin and 2.5 mg of diluted verapamil  via the radial sheaths. A 5-Tuvaluan Tiger diagnostic catheter was  advanced to the ascending aorta with little difficulty due to angulated  right subclavian artery. The left main coronary artery was engaged and  a coronary angiogram was done. This catheter failed to engage the right  coronary artery. It was exchanged to a 6-Tuvaluan AR-2 diagnostic  catheter which was able to engage the right coronary artery and a  coronary angiogram was done. FINDINGS:  HEMODYNAMICS:  Aortic pressure 126/92 mmHg. CORONARY ARTERY:  1. Left main:  It is a very large and long artery with 20% distal  stenosis. 2.  LAD:  It is a very large artery, wrapping around the apex and giving  rise to two diagonal branches and two large septal perforators. There  was 50% discrete proximal LAD stenosis prior to septal . 3.  Left circumflex: It is a very large artery, giving rise to two  obtuse marginal branches then continues to the AV groove.   The first  marginal was small and repeated, revealed no edge dissection. The stents were well apposed, but slightly undersized in the mid  segment. Then, a 5.0 x 20 noncompliant balloon was inflated twice  inside the stent at 16 atmospheres with good final angiographic results. At the end of the procedure, the wire and the GuideLiner were pulled  out. The guiding catheter was exchanged over the guidewire to a  5-Romanian pigtail which was advanced into the left ventricle without  difficulty. The left ventricular end diastolic pressure was measured at  39 mmHg. Left ventriculogram done revealed mildly dilated left  ventricle with an ejection fraction of 45%. There was no significant  gradient across the aortic valve. At the end of the procedure, the  pigtail was pulled out. The Angiomax infusion was discontinued. The  Terumo Slender sheath was pulled out and a Vasc Band was applied over  the right radial artery with good hemostasis. The patient tolerated the  procedure very well and no complications were noted. The patient's  blood pressure was elevated during the procedure. He was still on  intravenous nitroglycerin. IMPRESSION:  1. Severe unstable distal RCA plaque with probable clot, status post  two drug eluting stents. 2.  Moderate proximal LAD stenosis. 3.  Significantly elevated left ventricular end diastolic pressure. 4.  Mild left ventricular systolic dysfunction. RECOMMENDATIONS:  1. Aggressive medical therapy. 2.  Dual antiplatelet therapy. 3.  Cardiac rehab post hospital discharge. PROCEDURE SEDATION START TIME:  09:42 a.m. PROCEDURE END TIME:  11:18 a.m. FLUOROSCOPY TIME:  23:04 minutes. CONTRAST VOLUME:  285 mL of Optiray. CONSCIOUS SEDATION:  1 mg of intravenous Versed and 125 mcg of  intravenous fentanyl.                 Mery Lin MD    D: 09/13/2019 11:53:02       T: 09/13/2019 14:25:42     [id number]  Job#: [id number]     Doc#: [id number]    CC:

## 2019-09-14 VITALS
SYSTOLIC BLOOD PRESSURE: 133 MMHG | HEART RATE: 67 BPM | RESPIRATION RATE: 16 BRPM | DIASTOLIC BLOOD PRESSURE: 90 MMHG | HEIGHT: 72 IN | TEMPERATURE: 97.3 F | OXYGEN SATURATION: 96 % | BODY MASS INDEX: 42.66 KG/M2 | WEIGHT: 315 LBS

## 2019-09-14 LAB
EKG ATRIAL RATE: 73 BPM
EKG P AXIS: 24 DEGREES
EKG P-R INTERVAL: 158 MS
EKG Q-T INTERVAL: 426 MS
EKG QRS DURATION: 88 MS
EKG QTC CALCULATION (BAZETT): 469 MS
EKG R AXIS: -28 DEGREES
EKG T AXIS: 135 DEGREES
EKG VENTRICULAR RATE: 73 BPM

## 2019-09-14 PROCEDURE — 6370000000 HC RX 637 (ALT 250 FOR IP): Performed by: INTERNAL MEDICINE

## 2019-09-14 PROCEDURE — 94660 CPAP INITIATION&MGMT: CPT

## 2019-09-14 PROCEDURE — 2580000003 HC RX 258: Performed by: INTERNAL MEDICINE

## 2019-09-14 PROCEDURE — 93005 ELECTROCARDIOGRAM TRACING: CPT | Performed by: INTERNAL MEDICINE

## 2019-09-14 PROCEDURE — 99233 SBSQ HOSP IP/OBS HIGH 50: CPT | Performed by: INTERNAL MEDICINE

## 2019-09-14 RX ORDER — NITROGLYCERIN 0.4 MG/1
0.4 TABLET SUBLINGUAL EVERY 5 MIN PRN
Status: DISCONTINUED | OUTPATIENT
Start: 2019-09-14 | End: 2019-09-14 | Stop reason: SDUPTHER

## 2019-09-14 RX ORDER — NITROGLYCERIN 0.4 MG/1
0.4 TABLET SUBLINGUAL EVERY 5 MIN PRN
Status: DISCONTINUED | OUTPATIENT
Start: 2019-09-14 | End: 2019-09-14 | Stop reason: HOSPADM

## 2019-09-14 RX ORDER — METOPROLOL SUCCINATE 50 MG/1
50 TABLET, EXTENDED RELEASE ORAL DAILY
Qty: 30 TABLET | Refills: 3 | Status: SHIPPED | OUTPATIENT
Start: 2019-09-15 | End: 2020-01-14 | Stop reason: DRUGHIGH

## 2019-09-14 RX ORDER — METOPROLOL SUCCINATE 50 MG/1
50 TABLET, EXTENDED RELEASE ORAL DAILY
Status: DISCONTINUED | OUTPATIENT
Start: 2019-09-15 | End: 2019-09-14 | Stop reason: HOSPADM

## 2019-09-14 RX ORDER — RANITIDINE 300 MG/1
300 TABLET ORAL NIGHTLY
Qty: 30 TABLET | Refills: 3 | Status: SHIPPED | OUTPATIENT
Start: 2019-09-14 | End: 2021-10-26

## 2019-09-14 RX ADMIN — SODIUM CHLORIDE: 9 INJECTION, SOLUTION INTRAVENOUS at 06:38

## 2019-09-14 RX ADMIN — TICAGRELOR 90 MG: 90 TABLET ORAL at 08:33

## 2019-09-14 RX ADMIN — FAMOTIDINE 20 MG: 20 TABLET ORAL at 08:33

## 2019-09-14 RX ADMIN — ESCITALOPRAM 10 MG: 10 TABLET, FILM COATED ORAL at 08:33

## 2019-09-14 RX ADMIN — METOPROLOL SUCCINATE 25 MG: 25 TABLET, EXTENDED RELEASE ORAL at 08:33

## 2019-09-14 RX ADMIN — ASPIRIN 81 MG 81 MG: 81 TABLET ORAL at 08:33

## 2019-09-14 RX ADMIN — Medication 10 ML: at 08:33

## 2019-09-14 ASSESSMENT — PAIN SCALES - GENERAL: PAINLEVEL_OUTOF10: 0

## 2019-09-14 NOTE — PROGRESS NOTES
Date: 9/13/2019    Time: 11:37 PM    Patient Placed On BIPAP/CPAP/ Non-Invasive Ventilation? Yes    If no must comment. Facial area red/color change? No           If YES are Blister/Lesion present? No   If yes must notify nursing staff  BIPAP/CPAP skin barrier?   Yes    Skin barrier type:Liquicel       Comments:        Kylah Srinivasan

## 2020-01-15 ENCOUNTER — TELEPHONE (OUTPATIENT)
Dept: ENT CLINIC | Age: 55
End: 2020-01-15

## 2020-01-15 NOTE — TELEPHONE ENCOUNTER
New referral was sent for same problem of dysphagia, last seen by Dr Leyla Mclaughlin 04-01-19 in [facility]. Pt is a current smoker. He is now scheduled for 02-17-20 in [facility] [address] [facility]. Please review to see if pt needs seen sooner.

## 2020-01-27 ENCOUNTER — HOSPITAL ENCOUNTER (OUTPATIENT)
Age: 55
Setting detail: OBSERVATION
Discharge: HOME | End: 2020-01-28
Attending: EMERGENCY MEDICINE | Admitting: INTERNAL MEDICINE
Payer: MEDICARE

## 2020-01-27 PROCEDURE — 99285 EMERGENCY DEPT VISIT HI MDM: CPT

## 2020-01-27 PROCEDURE — 94761 N-INVAS EAR/PLS OXIMETRY MLT: CPT

## 2020-01-28 ENCOUNTER — APPOINTMENT (OUTPATIENT)
Dept: GENERAL RADIOLOGY | Age: 55
End: 2020-01-28
Payer: MEDICARE

## 2020-01-28 ENCOUNTER — APPOINTMENT (OUTPATIENT)
Dept: NUCLEAR MEDICINE | Age: 55
End: 2020-01-28
Payer: MEDICARE

## 2020-01-28 VITALS
SYSTOLIC BLOOD PRESSURE: 165 MMHG | DIASTOLIC BLOOD PRESSURE: 98 MMHG | OXYGEN SATURATION: 97 % | BODY MASS INDEX: 42.66 KG/M2 | RESPIRATION RATE: 18 BRPM | WEIGHT: 315 LBS | HEART RATE: 57 BPM | HEIGHT: 72 IN | TEMPERATURE: 97.8 F

## 2020-01-28 PROBLEM — R07.9 CHEST PAIN: Status: ACTIVE | Noted: 2020-01-28

## 2020-01-28 LAB
ALBUMIN SERPL-MCNC: 4.1 G/DL (ref 3.5–5.2)
ALP BLD-CCNC: 79 U/L (ref 40–129)
ALT SERPL-CCNC: 22 U/L (ref 0–40)
ANION GAP SERPL CALCULATED.3IONS-SCNC: 13 MMOL/L (ref 7–16)
AST SERPL-CCNC: 17 U/L (ref 0–39)
BASOPHILS ABSOLUTE: 0.03 E9/L (ref 0–0.2)
BASOPHILS RELATIVE PERCENT: 0.3 % (ref 0–2)
BILIRUB SERPL-MCNC: 0.2 MG/DL (ref 0–1.2)
BUN BLDV-MCNC: 19 MG/DL (ref 6–20)
CALCIUM SERPL-MCNC: 9.2 MG/DL (ref 8.6–10.2)
CHLORIDE BLD-SCNC: 102 MMOL/L (ref 98–107)
CO2: 24 MMOL/L (ref 22–29)
CREAT SERPL-MCNC: 1.2 MG/DL (ref 0.7–1.2)
EKG ATRIAL RATE: 69 BPM
EKG P AXIS: 30 DEGREES
EKG P-R INTERVAL: 160 MS
EKG Q-T INTERVAL: 414 MS
EKG QRS DURATION: 86 MS
EKG QTC CALCULATION (BAZETT): 443 MS
EKG R AXIS: -26 DEGREES
EKG T AXIS: 168 DEGREES
EKG VENTRICULAR RATE: 69 BPM
EOSINOPHILS ABSOLUTE: 0.19 E9/L (ref 0.05–0.5)
EOSINOPHILS RELATIVE PERCENT: 2.1 % (ref 0–6)
GFR AFRICAN AMERICAN: >60
GFR NON-AFRICAN AMERICAN: >60 ML/MIN/1.73
GLUCOSE BLD-MCNC: 152 MG/DL (ref 74–99)
HCT VFR BLD CALC: 44.4 % (ref 37–54)
HEMOGLOBIN: 14.5 G/DL (ref 12.5–16.5)
IMMATURE GRANULOCYTES #: 0.04 E9/L
IMMATURE GRANULOCYTES %: 0.4 % (ref 0–5)
INR BLD: 1
LV EF: 45 %
LVEF MODALITY: NORMAL
LYMPHOCYTES ABSOLUTE: 2.53 E9/L (ref 1.5–4)
LYMPHOCYTES RELATIVE PERCENT: 27.9 % (ref 20–42)
MAGNESIUM: 2.1 MG/DL (ref 1.6–2.6)
MCH RBC QN AUTO: 30.9 PG (ref 26–35)
MCHC RBC AUTO-ENTMCNC: 32.7 % (ref 32–34.5)
MCV RBC AUTO: 94.7 FL (ref 80–99.9)
MONOCYTES ABSOLUTE: 0.69 E9/L (ref 0.1–0.95)
MONOCYTES RELATIVE PERCENT: 7.6 % (ref 2–12)
NEUTROPHILS ABSOLUTE: 5.6 E9/L (ref 1.8–7.3)
NEUTROPHILS RELATIVE PERCENT: 61.7 % (ref 43–80)
PDW BLD-RTO: 13.2 FL (ref 11.5–15)
PLATELET # BLD: 187 E9/L (ref 130–450)
PMV BLD AUTO: 9.4 FL (ref 7–12)
POTASSIUM SERPL-SCNC: 3.9 MMOL/L (ref 3.5–5)
PRO-BNP: 631 PG/ML (ref 0–125)
PROTHROMBIN TIME: 11.5 SEC (ref 9.3–12.4)
RBC # BLD: 4.69 E12/L (ref 3.8–5.8)
SODIUM BLD-SCNC: 139 MMOL/L (ref 132–146)
TOTAL PROTEIN: 6.8 G/DL (ref 6.4–8.3)
TROPONIN: <0.01 NG/ML (ref 0–0.03)
TROPONIN: <0.01 NG/ML (ref 0–0.03)
WBC # BLD: 9.1 E9/L (ref 4.5–11.5)

## 2020-01-28 PROCEDURE — 85025 COMPLETE CBC W/AUTO DIFF WBC: CPT

## 2020-01-28 PROCEDURE — 80053 COMPREHEN METABOLIC PANEL: CPT

## 2020-01-28 PROCEDURE — 93005 ELECTROCARDIOGRAM TRACING: CPT | Performed by: NURSE PRACTITIONER

## 2020-01-28 PROCEDURE — 83880 ASSAY OF NATRIURETIC PEPTIDE: CPT

## 2020-01-28 PROCEDURE — 6360000004 HC RX CONTRAST MEDICATION: Performed by: INTERNAL MEDICINE

## 2020-01-28 PROCEDURE — G0378 HOSPITAL OBSERVATION PER HR: HCPCS

## 2020-01-28 PROCEDURE — 6360000002 HC RX W HCPCS: Performed by: NURSE PRACTITIONER

## 2020-01-28 PROCEDURE — 84484 ASSAY OF TROPONIN QUANT: CPT

## 2020-01-28 PROCEDURE — A9500 TC99M SESTAMIBI: HCPCS | Performed by: RADIOLOGY

## 2020-01-28 PROCEDURE — 3430000000 HC RX DIAGNOSTIC RADIOPHARMACEUTICAL: Performed by: RADIOLOGY

## 2020-01-28 PROCEDURE — 93010 ELECTROCARDIOGRAM REPORT: CPT | Performed by: INTERNAL MEDICINE

## 2020-01-28 PROCEDURE — 96375 TX/PRO/DX INJ NEW DRUG ADDON: CPT

## 2020-01-28 PROCEDURE — 99222 1ST HOSP IP/OBS MODERATE 55: CPT | Performed by: INTERNAL MEDICINE

## 2020-01-28 PROCEDURE — 36415 COLL VENOUS BLD VENIPUNCTURE: CPT

## 2020-01-28 PROCEDURE — 96374 THER/PROPH/DIAG INJ IV PUSH: CPT

## 2020-01-28 PROCEDURE — 93306 TTE W/DOPPLER COMPLETE: CPT

## 2020-01-28 PROCEDURE — 71045 X-RAY EXAM CHEST 1 VIEW: CPT

## 2020-01-28 PROCEDURE — 6370000000 HC RX 637 (ALT 250 FOR IP): Performed by: INTERNAL MEDICINE

## 2020-01-28 PROCEDURE — 2580000003 HC RX 258: Performed by: NURSE PRACTITIONER

## 2020-01-28 PROCEDURE — 85610 PROTHROMBIN TIME: CPT

## 2020-01-28 PROCEDURE — 83735 ASSAY OF MAGNESIUM: CPT

## 2020-01-28 RX ORDER — FAMOTIDINE 20 MG/1
20 TABLET, FILM COATED ORAL NIGHTLY
Status: DISCONTINUED | OUTPATIENT
Start: 2020-01-28 | End: 2020-01-28 | Stop reason: HOSPADM

## 2020-01-28 RX ORDER — METOPROLOL SUCCINATE 25 MG/1
50 TABLET, EXTENDED RELEASE ORAL 2 TIMES DAILY
Status: DISCONTINUED | OUTPATIENT
Start: 2020-01-28 | End: 2020-01-28 | Stop reason: HOSPADM

## 2020-01-28 RX ORDER — ONDANSETRON 2 MG/ML
4 INJECTION INTRAMUSCULAR; INTRAVENOUS EVERY 6 HOURS PRN
Status: DISCONTINUED | OUTPATIENT
Start: 2020-01-28 | End: 2020-01-28 | Stop reason: HOSPADM

## 2020-01-28 RX ORDER — CLONIDINE HYDROCHLORIDE 0.1 MG/1
0.1 TABLET ORAL 2 TIMES DAILY
Status: DISCONTINUED | OUTPATIENT
Start: 2020-01-28 | End: 2020-01-28 | Stop reason: HOSPADM

## 2020-01-28 RX ORDER — LOSARTAN POTASSIUM 25 MG/1
100 TABLET ORAL NIGHTLY
Status: DISCONTINUED | OUTPATIENT
Start: 2020-01-28 | End: 2020-01-28 | Stop reason: HOSPADM

## 2020-01-28 RX ORDER — ONDANSETRON 2 MG/ML
4 INJECTION INTRAMUSCULAR; INTRAVENOUS ONCE
Status: COMPLETED | OUTPATIENT
Start: 2020-01-28 | End: 2020-01-28

## 2020-01-28 RX ORDER — ESCITALOPRAM OXALATE 10 MG/1
10 TABLET ORAL DAILY
Status: DISCONTINUED | OUTPATIENT
Start: 2020-01-28 | End: 2020-01-28 | Stop reason: HOSPADM

## 2020-01-28 RX ORDER — ASPIRIN 81 MG/1
81 TABLET, CHEWABLE ORAL DAILY
Status: DISCONTINUED | OUTPATIENT
Start: 2020-01-29 | End: 2020-01-28 | Stop reason: HOSPADM

## 2020-01-28 RX ORDER — SODIUM CHLORIDE 0.9 % (FLUSH) 0.9 %
10 SYRINGE (ML) INJECTION PRN
Status: DISCONTINUED | OUTPATIENT
Start: 2020-01-28 | End: 2020-01-28 | Stop reason: SDUPTHER

## 2020-01-28 RX ORDER — BUDESONIDE AND FORMOTEROL FUMARATE DIHYDRATE 160; 4.5 UG/1; UG/1
2 AEROSOL RESPIRATORY (INHALATION) 2 TIMES DAILY
Status: DISCONTINUED | OUTPATIENT
Start: 2020-01-28 | End: 2020-01-28 | Stop reason: HOSPADM

## 2020-01-28 RX ORDER — SODIUM CHLORIDE 0.9 % (FLUSH) 0.9 %
10 SYRINGE (ML) INJECTION EVERY 12 HOURS SCHEDULED
Status: DISCONTINUED | OUTPATIENT
Start: 2020-01-28 | End: 2020-01-28 | Stop reason: SDUPTHER

## 2020-01-28 RX ORDER — ATORVASTATIN CALCIUM 40 MG/1
40 TABLET, FILM COATED ORAL DAILY
Status: DISCONTINUED | OUTPATIENT
Start: 2020-01-28 | End: 2020-01-28 | Stop reason: HOSPADM

## 2020-01-28 RX ORDER — MORPHINE SULFATE 4 MG/ML
4 INJECTION, SOLUTION INTRAMUSCULAR; INTRAVENOUS ONCE
Status: COMPLETED | OUTPATIENT
Start: 2020-01-28 | End: 2020-01-28

## 2020-01-28 RX ORDER — ACETAMINOPHEN 325 MG/1
650 TABLET ORAL EVERY 4 HOURS PRN
Status: DISCONTINUED | OUTPATIENT
Start: 2020-01-28 | End: 2020-01-28 | Stop reason: HOSPADM

## 2020-01-28 RX ORDER — SODIUM CHLORIDE 0.9 % (FLUSH) 0.9 %
10 SYRINGE (ML) INJECTION PRN
Status: DISCONTINUED | OUTPATIENT
Start: 2020-01-28 | End: 2020-01-28 | Stop reason: HOSPADM

## 2020-01-28 RX ORDER — SODIUM CHLORIDE 0.9 % (FLUSH) 0.9 %
10 SYRINGE (ML) INJECTION EVERY 12 HOURS SCHEDULED
Status: DISCONTINUED | OUTPATIENT
Start: 2020-01-28 | End: 2020-01-28 | Stop reason: HOSPADM

## 2020-01-28 RX ADMIN — MORPHINE SULFATE 4 MG: 4 INJECTION, SOLUTION INTRAMUSCULAR; INTRAVENOUS at 01:05

## 2020-01-28 RX ADMIN — PERFLUTREN 1.65 MG: 6.52 INJECTION, SUSPENSION INTRAVENOUS at 12:02

## 2020-01-28 RX ADMIN — TICAGRELOR 90 MG: 90 TABLET ORAL at 09:24

## 2020-01-28 RX ADMIN — CLONIDINE HYDROCHLORIDE 0.1 MG: 0.1 TABLET ORAL at 09:24

## 2020-01-28 RX ADMIN — Medication 10 ML: at 09:25

## 2020-01-28 RX ADMIN — ONDANSETRON 4 MG: 2 INJECTION INTRAMUSCULAR; INTRAVENOUS at 01:05

## 2020-01-28 RX ADMIN — Medication 10.7 MILLICURIE: at 09:50

## 2020-01-28 ASSESSMENT — HEART SCORE: ECG: 1

## 2020-01-28 ASSESSMENT — PAIN SCALES - GENERAL
PAINLEVEL_OUTOF10: 5
PAINLEVEL_OUTOF10: 8
PAINLEVEL_OUTOF10: 0

## 2020-01-28 NOTE — ED NOTES
Morning labs drawn, pt upset that he is unable to have food or drink \"Man, they can't do another stress test, I've already had 6 of them and they had to quit the last time because I just locked up, I just want to go home\" offered pillow and other comfort measures, patient refused at this time, bed in lowest locked position, call light in easy reach, will continue to monitor.       Chito , RN  01/28/20 [id number]

## 2020-01-28 NOTE — H&P
Richy Ceja is a 48 y.o. male  Chief Complaint   Patient presents with    Hypertension     HPI  Pt seen earlier in ER, he has no more cp. He is frustrated and keeps saying he wants to leave. No current facility-administered medications on file prior to encounter. Current Outpatient Medications on File Prior to Encounter   Medication Sig Dispense Refill    albuterol (PROVENTIL) (2.5 MG/3ML) 0.083% nebulizer solution Take 3 mLs by nebulization every 6 hours as needed for Wheezing 120 each 3    budesonide-formoterol (SYMBICORT) 160-4.5 MCG/ACT AERO Inhale 2 puffs into the lungs 2 times daily 1 Inhaler 3    predniSONE (DELTASONE) 10 MG tablet One tablet 3 times daily x 3 days, then twice daily x 3 days, then once daily.  18 tablet 0    atorvastatin (LIPITOR) 40 MG tablet Take 1 tablet by mouth daily 90 tablet 1    metoprolol succinate (TOPROL XL) 50 MG extended release tablet Take 1 tablet by mouth 2 times daily 60 tablet 3    cloNIDine (CATAPRES) 0.1 MG tablet Take 1 tablet by mouth 2 times daily 60 tablet 3    ticagrelor (BRILINTA) 90 MG TABS tablet Take 1 tablet by mouth 2 times daily 60 tablet 1    ranitidine (ZANTAC) 300 MG tablet Take 1 tablet by mouth nightly 30 tablet 3    escitalopram (LEXAPRO) 10 MG tablet Take 1 tablet by mouth daily 90 tablet 1    losartan (COZAAR) 100 MG tablet Take 1 tablet by mouth nightly 30 tablet 2    aspirin 81 MG tablet Take 81 mg by mouth daily        Past Medical History:   Diagnosis Date    Accelerated hypertension 5/5/2017    Arthritis     CAD (coronary artery disease)     Cerebral artery occlusion with cerebral infarction ([facility]     TIA    [facility]est[facility] failure[facility]     COPD (chronic obstructive pulmonary disease) ([facility])     Depression with anxiety     Fibromyalgia     GI bleeding     Hyperlipidemia     Hypertension     Myocardial infarct ([address]     in past, per testing, date unknown    Sleep apnea     cpap    Urinary frequency Past Surgical History:   Procedure Laterality Date    CARDIAC CATHETERIZATION  03/16/2017    Dr Morris Martínez  04/12/2018    Dr. Dougie Rain  08/18/2017    CORONARY ANGIOPLASTY WITH STENT PLACEMENT  09/13/2019    rca distal 4.5x 30    ENDOSCOPY, COLON, DIAGNOSTIC  08/18/2017    [address] INCL FLUOR GDNCE DX W/CELL WASHG SPX N/A 10/22/2018    DIRECT LARYNGOSCOPY performed by Charleen Durant DO at [facility] OR    [place] LARYNGOSCOPY,DIRCT,OP SCOPE,BIOPSY N/A 11/1/2018    MICRO LARYNGOSCOPY WITH BIOPSY TRUE VOCAL CORD LESION performed [facility]ha Landrum DO [facility] OR    TONSILLECTOMY       Social History     Socioeconomic History    Marital status: Single     Spouse name: Not on file    Number of children: Not on file    Years of education: Not on file    Highest education level: Not on file   Occupational History    Not on file   Social Needs    Financial resource strain: Not on file    Food insecurity:     Worry: Not on file     Inability: Not on file    Transportation needs:     Medical: Not on file     Non-medical: Not on file   Tobacco Use    Smoking status: Current Every Day Smoker     Packs/day: 0.25     Years: 33.00     Pack years: 8.25     Types: Cigarettes    Smokeless tobacco: Never Used    Tobacco comment: currently 2-3 cigarettes/day, 9/21/18   Substance and Sexual Activity    Alcohol use:  Yes     Alcohol/week: 6.0 standard drinks     Types: 6 Cans of beer per week     Comment: social    Drug use: Yes     Types: Marijuana     Comment: once a week    Sexual activity: Not on file   Lifestyle    Physical activity:     Days per week: Not on file     Minutes per session: Not on file    Stress: Not on file   Relationships    Social connections:     Talks on phone: Not on file     Gets together: Not on file     Attends Yarsani service: Not on file     Active member of club or organization: Not on file     Attends meetings of clubs or

## 2020-01-28 NOTE — PROGRESS NOTES
Pt came down for nuclear stress test. He was injected with the isotope for images and brought in to start. When pt got on imaging table he got very anxious and said he was having trouble breathing and couldn't go under the camera. Also his arms would hurt too bad to have up above his head. We notified Dr. Juanita Maldonado that the pt was unable to do nuclear stress and we sent him back to his room.

## 2020-01-28 NOTE — CARE COORDINATION
Transition of care: Met with pt in room. Pt lives alone in a 1st floor apartment. Independent with ADLs and drives. DME- CPAP, nebulizer and a cane. States he uses the cane all the time. Pt does not have home o2. States when he was discharged last Sept he used his free 30 day supply of Brilinta. When he went for refills he was told his co-pay was going to be $140.00 and he was unaware he had to follow up with cardiology office if there were any issues with getting his Brilinta. He said he was unable to afford the co-pay. Pt has BCBS Medicare and [place] medicaid as secondary. I called and spoke with Leatha Lopes at [address] [place] [address],[address] Floor TL#[phone]. Per Leatha Lopes, his co-pay for 30 day supply of Brilinta is $3.90 and they have it in stock. I spoke with pt and he is agreeable to pay the $3.90 co-pay per month. I told him if there are any issues in the future to let his cardiology office know right away and I stressed the importance of taking the [address]. Pt verbalized understanding. PCP is Dr. Girard Severance and pharmacy is [facility]. Sw/cm will follow.

## 2020-01-28 NOTE — CONSULTS
CHIEF COMPLAINT: Chest pain/CAD    HISTORY OF PRESENT ILLNESS: Patient is a 48 y.o. male seen at the request of Heber Morfin MD and followed at our office by Dr. Hung Addison. Patient with PCI to RCA with residual LAD disease 9/19 presents with CP. Patient complains of chest pain. Onset was 1 day ago, with improving course since that time. The patient describes the pain as intermittent, precordial and pressure like in nature, does not radiate. Patient rates pain as a severe in intensity. Associated symptoms are chest pressure/discomfort and dyspnea. Aggravating factors are none. Alleviating factors are: nitroglycerin SL tablets. Not compliant with Brilinta after first month.       Past Medical History:   Diagnosis Date    Accelerated hypertension 5/5/2017    Arthritis     CAD (coronary artery disease)     Cerebral artery occlusion with cerebral infarction (HCC)     TIA    CHF (congestive heart failure) (HCC)     COPD (chronic obstructive pulmonary disease) ([address]     Depression with anxiety     Fibromyalgia     GI bleeding     Hyperlipidemia     Hypertension     Myocardial infarct ([address]     in past, per testing, date unknown    Sleep apnea     cpap    Urinary frequency        Patient Active Problem List   Diagnosis    Right shoulder pain    Carpal tunnel syndrome of right wrist    Overuse syndrome    Exertional dyspnea    Obstructive sleep apnea    Morbid obesity due to excess calories (HCC)    Chronic kidney disease, stage II (mild)    Pure hypercholesterolemia    Atherosclerosis of native coronary artery of native heart with stable angina pectoris ([address]    Essential hypertension    Left arm pain    Accelerated hypertension    (HFpEF) heart failure with preserved ejection fraction (HCC)    Class 3 obesity without serious comorbidity with body mass index (BMI) of 45.0 to 49.9 in adult    Dyspnea on exertion    JARRET (obstructive sleep apnea)    TIA (transient ischemic Daily Nima Mendez,         perflutren lipid microspheres (DEFINITY) injection 1.65 mg  1.5 mL Intravenous ONCE PRN Edvin Headings, DO        regadenoson (LEXISCAN) injection 0.4 mg  0.4 mg Intravenous ONCE PRN Edvin Headings, DO           Social History     Socioeconomic History    Marital status: Single     Spouse name: Not on file    Number of children: Not on file    Years of education: Not on file    Highest education level: Not on file   Occupational History    Not on file   Social Needs    Financial resource strain: Not on file    Food insecurity:     Worry: Not on file     Inability: Not on file    Transportation needs:     Medical: Not on file     Non-medical: Not on file   Tobacco Use    Smoking status: Current Every Day Smoker     Packs/day: 0.25     Years: 33.00     Pack years: 8.25     Types: Cigarettes    Smokeless tobacco: Never Used    Tobacco comment: currently 2-3 cigarettes/day, 9/21/18   Substance and Sexual Activity    Alcohol use: Yes     Alcohol/week: 6.0 standard drinks     Types: 6 Cans of beer per week     Comment: social    Drug use: Yes     Types: Marijuana     Comment: once a week    Sexual activity: Not on file   Lifestyle    Physical activity:     Days per week: Not on file     Minutes per session: Not on file    Stress: Not on file   Relationships    Social connections:     Talks on phone: Not on file     Gets together: Not on file     Attends Mormon service: Not on file     Active member of club or organization: Not on file     Attends meetings of clubs or organizations: Not on file     Relationship status: Not on file    Intimate partner violence:     Fear of current or ex partner: Not on file     Emotionally abused: Not on file     Physically abused: Not on file     Forced sexual activity: Not on file   Other Topics Concern    Not on file   Social History Narrative    Drinks occ [employer].         Family History   Problem Relation Age of Onset    Heart Disease Mother     Other Father     No Known Problems Sister     No Known Problems Brother     No Known Problems Sister     No Known Problems Brother        Review of Systems:   Heart: as above   Lungs: as above   Eyes: denies changes in vision or discharge. Ears: denies changes in hearing or pain. Nose: denies epistaxis or masses   Throat: denies sore throat or trouble swallowing. Neuro: denies numbness, tingling, tremors. Skin: denies rashes or itching. : denies hematuria, dysuria   GI: denies vomiting, diarrhea   Psych: denies mood changed, anxiety, depression. all others negative. Physical Exam   BP (!) 165/98   Pulse 57   Temp 97.5 °F (36.4 °C)   Resp 16   Ht 6' (1.829 m)   Wt (!) 362 lb (164.2 kg)   SpO2 99%   BMI 49.10 kg/m²   Constitutional: Oriented to person, place, and time. Well-developed and well-nourished. No distress. Head: Normocephalic and atraumatic. Eyes: EOM are normal. Pupils are equal, round, and reactive to light. Neck: Normal range of motion. Neck supple. No hepatojugular reflux and no JVD present. Carotid bruit is not present. No tracheal deviation present. No thyromegaly present. Cardiovascular: Normal rate, regular rhythm, normal heart sounds and intact distal pulses. Exam reveals no gallop and no friction rub. No murmur heard. Pulmonary/Chest: Effort normal and breath sounds normal. No respiratory distress. No wheezes. No rales. No tenderness. Abdominal: Soft. Bowel sounds are normal. No distension and no mass. No tenderness. No rebound and no guarding. Musculoskeletal: Normal range of motion. No edema and no tenderness. Lymphadenopathy:   No cervical adenopathy. No groin adenopathy. Neurological: Alert and oriented to person, place, and time. Skin: Skin is warm and dry. No rash noted. Not diaphoretic. No erythema. Psychiatric: Normal mood and affect.  Behavior is normal.       CBC:   Lab Results   Component Value Date    WBC 9.1 01/28/2020    RBC NSTEMI (non-ST elevated myocardial infarction) (HCC)    Chest pain     1. CP/CAD: PCI to RCA with residual LAD disease 9/19. Non-compliant with dual antiplatelet due to cost.    EKG no change. CE negative. Pharm stress and echo. Exclude PE. Ask social service to help with Brilinta if we can. 2. Chronic Diastolic CHF: Watch volume. 3. HTN: Observe. 4. CKD    5. JARRET    6. Lipids: Statin. Adele Goyal D.O.   Cardiologist  Cardiology, [id number]

## 2020-01-28 NOTE — ED PROVIDER NOTES
ED Physician   HPI:  1/28/20, Time: 12:22 AM         Sangeetha Garay is a 48 y.o. male presenting to the ED for chest pain. Patient reports that tonight around 10:30 PM he was just moving from one side of the couch to the other when suddenly had a sharp shooting pain into the left arm left axilla region that is going into the upper left chest.  Patient reports that the pain has been constant. Patient reports that he immediately took 2 Tylenol as well as his evening medication without any relief. He did call EMS and he was then provided with 4 baby aspirin as well as 2 nitro sublingual.  Reports pain originally was 10 out of 10 pain is now a 7/10. Patient does have a history of non-ST elevated MI with 2 cardiac stent placements back in September, 2019 by Dr. Genaro Joiner. Patient also with congestive heart failure    Heart cath 9/12/2019-  IMPRESSION:  1. Severe unstable distal RCA plaque with probable clot, status post  two drug eluting stents. 2.  Moderate proximal LAD stenosis. 3.  Significantly elevated left ventricular end diastolic pressure. 4.  Mild left ventricular systolic dysfunction. Review of Systems:   Pertinent positives and negatives are stated within HPI, all other systems reviewed and are negative.          --------------------------------------------- PAST HISTORY ---------------------------------------------  Past Medical History:  has a past medical history of Accelerated hypertension, Arthritis, CAD (coronary artery disease), Cerebral artery occlusion with cerebral infarction ([facility] [address] CHF (congestive heart failure) ([facility] [address] COPD (chronic obstructive pulmonary disease) ([facility] [address] Depression with anxiety, Fibromyalgia, GI bleeding, Hyperlipidemia, Hypertension, Myocardial infarct [facility]), Sleep apnea, and Urinary frequency. Past Surgical History:  has a past surgical history that includes Tonsillectomy; Colonoscopy (08/18/2017); Endoscopy, colon, diagnostic (08/18/2017);  Cardiac catheterization (03/16/2017); Cardiac catheterization (04/12/2018); pr [facility] incl fluor gdnce dx w/cell washg spx (N/A, 10/22/2018); pr laryngoscopy,dirct,op scope,biopsy (N/A, 11/1/2018); and Coronary angioplasty with stent (09/13/2019). Social History:  reports that he has been smoking cigarettes. He has a 8.25 pack-year smoking history. He has never used smokeless tobacco. He reports current alcohol use of about 6.0 standard drinks of alcohol per week. He reports current drug use. Drug: Marijuana. Family History: family history includes Heart Disease in his mother; No Known Problems in his brother, brother, sister, and sister; Other in his father. The patients home medications have been reviewed.     Allergies: Pcn [penicillins]    -------------------------------------------------- RESULTS -------------------------------------------------  All laboratory and radiology results have been personally reviewed by myself   LABS:  Results for orders placed or performed during the hospital encounter of 01/27/20   Troponin   Result Value Ref Range    Troponin <0.01 0.00 - 0.03 ng/mL   CBC Auto Differential   Result Value Ref Range    WBC 9.1 4.5 - 11.5 E9/L    RBC 4.69 3.80 - 5.80 E12/L    Hemoglobin 14.5 12.5 - 16.5 g/dL    Hematocrit 44.4 37.0 - 54.0 %    MCV 94.7 80.0 - 99.9 fL    MCH 30.9 26.0 - 35.0 pg    MCHC 32.7 32.0 - 34.5 %    RDW 13.2 11.5 - 15.0 fL    Platelets 132 603 - 181 E9/L    MPV 9.4 7.0 - 12.0 fL    Neutrophils % 61.7 43.0 - 80.0 %    Immature Granulocytes % 0.4 0.0 - 5.0 %    Lymphocytes % 27.9 20.0 - 42.0 %    Monocytes % 7.6 2.0 - 12.0 %    Eosinophils % 2.1 0.0 - 6.0 %    Basophils % 0.3 0.0 - 2.0 %    Neutrophils Absolute 5.60 1.80 - 7.30 E9/L    Immature Granulocytes # 0.04 E9/L    Lymphocytes Absolute 2.53 1.50 - 4.00 E9/L    Monocytes Absolute 0.69 0.10 - 0.95 E9/L    Eosinophils Absolute 0.19 0.05 - 0.50 E9/L    Basophils Absolute 0.03 0.00 - 0.20 E9/L   Comprehensive Metabolic Panel   Result Value Ref Range    Sodium 139 132 - 146 mmol/L    Potassium 3.9 3.5 - 5.0 mmol/L    Chloride 102 98 - 107 mmol/L    CO2 24 22 - 29 mmol/L    Anion Gap 13 7 - 16 mmol/L    Glucose 152 (H) 74 - 99 mg/dL    BUN 19 6 - 20 mg/dL    CREATININE 1.2 0.7 - 1.2 mg/dL    GFR Non-African American >60 >=60 mL/min/1.73    GFR African American >60     Calcium 9.2 8.6 - 10.2 mg/dL    Total Protein 6.8 6.4 - 8.3 g/dL    Alb 4.1 3.5 - 5.2 g/dL    Total Bilirubin 0.2 0.0 - 1.2 mg/dL    Alkaline Phosphatase 79 40 - 129 U/L    ALT 22 0 - 40 U/L    AST 17 0 - 39 U/L   Protime-INR   Result Value Ref Range    Protime 11.5 9.3 - 12.4 sec    INR 1.0    Magnesium   Result Value Ref Range    Magnesium 2.1 1.6 - 2.6 mg/dL   Brain Natriuretic Peptide   Result Value Ref Range    Pro- (H) 0 - 125 pg/mL       RADIOLOGY:  Interpreted by Radiologist.  XR CHEST PORTABLE    (Results Pending)       ------------------------- NURSING NOTES AND VITALS REVIEWED ---------------------------   The nursing notes within the ED encounter and vital signs as below have been reviewed. BP (!) 232/133   Pulse 85   Temp 97.3 °F (36.3 °C)   Resp 16   Ht 6' (1.829 m)   Wt (!) 362 lb (164.2 kg)   SpO2 95%   BMI 49.10 kg/m²   Oxygen Saturation Interpretation: Normal      ---------------------------------------------------PHYSICAL EXAM--------------------------------------      Constitutional/General: Alert and oriented x3, well appearing, non toxic in NAD  Head: Normocephalic and atraumatic  Eyes: PERRL, EOMI  Mouth: Oropharynx clear, handling secretions, no trismus  Neck: Supple, full ROM,   Pulmonary: Lungs clear to auscultation bilaterally, no wheezes, rales, or rhonchi. Not in respiratory distress  Cardiovascular:  Regular rate and rhythm, no murmurs, gallops, or rubs. 2+ distal pulses  Abdomen: Soft, non tender, non distended,   Extremities: Moves all extremities x 4.  Warm and well perfused  Skin: warm and dry without rash  Neurologic: GCS 15,  Psych: Normal Affect      ------------------------------ ED COURSE/MEDICAL DECISION MAKING----------------------  Medications   nitroglycerin (NITRO-BID) 2 % ointment 0.5 inch (0.5 inches Topical Not Given 1/28/20 0105)   morphine sulfate (PF) injection 4 mg (4 mg Intravenous Given 1/28/20 0105)   ondansetron (ZOFRAN) injection 4 mg (4 mg Intravenous Given 1/28/20 0105)         ED COURSE:       Medical Decision Making:    Plan be for labs will also obtain imaging and medicate for symptom relief. Twelve-lead EKG interpreted showing a heart rate of 69, normal sinus rhythm. Patient also with left ventricular hypertrophy as well as T wave depression this was compared to most recent EKG from January 14, 2020 which is actually unchanged. No acute ST elevation or depression noted. Labs resulted troponin negative, CBC unremarkable, chemistry panel all within normal limits, PT/INR within normal limits, magnesium level normal, proBNP slightly elevated at 631. Heart Score 6. Patient was provided with Nitropaste topically as well as morphine 4 mg and Zofran 4 mg. He also was provided with 4 baby aspirin by EMS. Repeat blood pressure now 159/97, chest x-ray unremarkable, no airspace opacity or pleural effusion. Plan will be for admission. I did speak with covering Physician Dr Kristopher Jackson, and she is agreeable to admit patient. Patient was made aware of all laboratory values. He is resting much more comfortably at this time, blood pressure also noted to be markedly improved now 159/97, heart rate 68, temp 97.3, respiratory rate 20 and pulse ox 96% on room air. Will be admitted to telemetry observation. Counseling: The emergency provider has spoken with the patient and discussed todays results, in addition to providing specific details for the plan of care and counseling regarding the diagnosis and prognosis.   Questions are answered at this time and they are agreeable with the plan.      --------------------------------- IMPRESSION AND DISPOSITION ---------------------------------    IMPRESSION  1. Chest pain, unspecified type    2. Essential hypertension        DISPOSITION  Disposition: Admit to telemetry  Patient condition is good      NOTE: This report was transcribed using voice recognition software.  Every effort was made to ensure accuracy; however, inadvertent computerized transcription errors may be present     YUMIKO Butler - CNP  01/28/20 0149

## 2020-02-06 NOTE — DISCHARGE SUMMARY
Physician Discharge Summary     Patient ID:  Brent Parks  [id number]  48 y.o.  1965    Admit date: 1/27/2020    Discharge date and time: 1/28/2020  5:19 PM     Admission Diagnoses: Chest pain [R07.9]    Discharge Diagnoses:   Patient Active Problem List   Diagnosis    Right shoulder pain    Carpal tunnel syndrome of right wrist    Overuse syndrome    Exertional dyspnea    Obstructive sleep apnea    Morbid obesity due to excess calories (HCC)    Chronic kidney disease, stage II (mild)    Pure hypercholesterolemia    Atherosclerosis of native coronary artery of native heart with stable angina pectoris (HCC)    Essential hypertension    Left arm pain    Accelerated hypertension    (HFpEF) heart failure with preserved ejection fraction (HCC)    Class 3 obesity without serious comorbidity with body mass index (BMI) of 45.0 to 49.9 in adult    Dyspnea on exertion    JARRET (obstructive sleep apnea)    TIA (transient ischemic attack)    Post-op pain    Lesion of true vocal cord    Tobacco abuse    NSTEMI (non-ST elevated myocardial infarction) ([facility] [address]    Chest pain     Medications Discontinued During This Encounter   Medication Reason    sodium chloride flush 0.9 % injection 10 mL DUPLICATE    sodium chloride flush 0.9 % injection 10 mL DUPLICATE    technetium sestamibi (CARDIOLITE) injection 35 millicurie Patient Discharge    regadenoson (LEXISCAN) injection 0.4 mg Patient Discharge    aspirin chewable tablet 81 mg Patient Discharge    ondansetron (ZOFRAN) injection 4 mg Patient Discharge    magnesium hydroxide (MILK OF MAGNESIA) 400 MG/5ML suspension 30 mL Patient Discharge    ticagrelor (BRILINTA) tablet 90 mg Patient Discharge    famotidine (PEPCID) tablet 20 mg Patient Discharge    metoprolol succinate (TOPROL XL) extended release tablet 50 mg Patient Discharge    losartan (COZAAR) tablet 100 mg Patient Discharge    escitalopram (LEXAPRO) tablet 10 mg Patient Discharge    cloNIDine (CATAPRES) tablet 0.1 mg Patient Discharge    budesonide-formoterol (SYMBICORT) 160-4.5 MCG/ACT inhaler 2 puff Patient Discharge    atorvastatin (LIPITOR) tablet 40 mg Patient Discharge    enoxaparin (LOVENOX) injection 40 mg Patient Discharge    acetaminophen (TYLENOL) tablet 650 mg Patient Discharge    sodium chloride flush 0.9 % injection 10 mL Patient Discharge    sodium chloride flush 0.9 % injection 10 mL Patient Discharge    nitroglycerin (NITRO-BID) 2 % ointment 0.5 inch Patient Discharge             Procedures: None    Hospital Course: Pt signed out AMA before any further testing could be done, he was scheduled for cardiac cath in am    Discharge Exam:  See progress note from today    Disposition: to home [address]  2/6/2020

## 2020-08-04 ENCOUNTER — HOSPITAL ENCOUNTER (OUTPATIENT)
Dept: GENERAL RADIOLOGY | Age: 55
Discharge: HOME | End: 2020-08-06
Payer: MEDICARE

## 2020-08-04 ENCOUNTER — HOSPITAL ENCOUNTER (OUTPATIENT)
Age: 55
Discharge: HOME | End: 2020-08-04
Payer: MEDICARE

## 2020-08-04 ENCOUNTER — HOSPITAL ENCOUNTER (OUTPATIENT)
Age: 55
Discharge: HOME | End: 2020-08-06
Payer: MEDICARE

## 2020-08-04 LAB
ALBUMIN SERPL-MCNC: 4.2 G/DL (ref 3.5–5.2)
ALP BLD-CCNC: 84 U/L (ref 40–129)
ALT SERPL-CCNC: 24 U/L (ref 0–40)
ANION GAP SERPL CALCULATED.3IONS-SCNC: 15 MMOL/L (ref 7–16)
AST SERPL-CCNC: 21 U/L (ref 0–39)
BASOPHILS ABSOLUTE: 0.03 E9/L (ref 0–0.2)
BASOPHILS RELATIVE PERCENT: 0.4 % (ref 0–2)
BILIRUB SERPL-MCNC: 0.5 MG/DL (ref 0–1.2)
BUN BLDV-MCNC: 18 MG/DL (ref 6–20)
CALCIUM SERPL-MCNC: 9.2 MG/DL (ref 8.6–10.2)
CHLORIDE BLD-SCNC: 101 MMOL/L (ref 98–107)
CHOLESTEROL, TOTAL: 205 MG/DL (ref 0–199)
CO2: 25 MMOL/L (ref 22–29)
CREAT SERPL-MCNC: 1.1 MG/DL (ref 0.7–1.2)
EOSINOPHILS ABSOLUTE: 0.11 E9/L (ref 0.05–0.5)
EOSINOPHILS RELATIVE PERCENT: 1.5 % (ref 0–6)
GFR AFRICAN AMERICAN: >60
GFR NON-AFRICAN AMERICAN: >60 ML/MIN/1.73
GLUCOSE BLD-MCNC: 106 MG/DL (ref 74–99)
HCT VFR BLD CALC: 47.1 % (ref 37–54)
HDLC SERPL-MCNC: 33 MG/DL
HEMOGLOBIN: 15.6 G/DL (ref 12.5–16.5)
IMMATURE GRANULOCYTES #: 0.06 E9/L
IMMATURE GRANULOCYTES %: 0.8 % (ref 0–5)
LDL CHOLESTEROL CALCULATED: 127 MG/DL (ref 0–99)
LYMPHOCYTES ABSOLUTE: 1.63 E9/L (ref 1.5–4)
LYMPHOCYTES RELATIVE PERCENT: 21.7 % (ref 20–42)
MCH RBC QN AUTO: 31.7 PG (ref 26–35)
MCHC RBC AUTO-ENTMCNC: 33.1 % (ref 32–34.5)
MCV RBC AUTO: 95.7 FL (ref 80–99.9)
MONOCYTES ABSOLUTE: 0.76 E9/L (ref 0.1–0.95)
MONOCYTES RELATIVE PERCENT: 10.1 % (ref 2–12)
NEUTROPHILS ABSOLUTE: 4.91 E9/L (ref 1.8–7.3)
NEUTROPHILS RELATIVE PERCENT: 65.5 % (ref 43–80)
PDW BLD-RTO: 13.3 FL (ref 11.5–15)
PLATELET # BLD: 180 E9/L (ref 130–450)
PMV BLD AUTO: 9.2 FL (ref 7–12)
POTASSIUM SERPL-SCNC: 4.2 MMOL/L (ref 3.5–5)
PRO-BNP: 716 PG/ML (ref 0–125)
RBC # BLD: 4.92 E12/L (ref 3.8–5.8)
SODIUM BLD-SCNC: 141 MMOL/L (ref 132–146)
TOTAL PROTEIN: 7.1 G/DL (ref 6.4–8.3)
TRIGL SERPL-MCNC: 224 MG/DL (ref 0–149)
VLDLC SERPL CALC-MCNC: 45 MG/DL
WBC # BLD: 7.5 E9/L (ref 4.5–11.5)

## 2020-08-04 PROCEDURE — 85025 COMPLETE CBC W/AUTO DIFF WBC: CPT

## 2020-08-04 PROCEDURE — 83880 ASSAY OF NATRIURETIC PEPTIDE: CPT

## 2020-08-04 PROCEDURE — 36415 COLL VENOUS BLD VENIPUNCTURE: CPT

## 2020-08-04 PROCEDURE — 80053 COMPREHEN METABOLIC PANEL: CPT

## 2020-08-04 PROCEDURE — 71046 X-RAY EXAM CHEST 2 VIEWS: CPT

## 2020-08-04 PROCEDURE — 80061 LIPID PANEL: CPT

## 2020-09-26 ENCOUNTER — HOSPITAL ENCOUNTER (EMERGENCY)
Age: 55
Discharge: LEFT BEFORE DISCHARGE | End: 2020-09-26
Attending: EMERGENCY MEDICINE
Payer: MEDICARE

## 2020-09-26 ENCOUNTER — APPOINTMENT (OUTPATIENT)
Dept: GENERAL RADIOLOGY | Age: 55
End: 2020-09-26
Payer: MEDICARE

## 2020-09-26 ENCOUNTER — APPOINTMENT (OUTPATIENT)
Dept: CT IMAGING | Age: 55
End: 2020-09-26
Payer: MEDICARE

## 2020-09-26 VITALS
HEART RATE: 75 BPM | WEIGHT: 315 LBS | RESPIRATION RATE: 16 BRPM | SYSTOLIC BLOOD PRESSURE: 149 MMHG | TEMPERATURE: 97.8 F | OXYGEN SATURATION: 94 % | BODY MASS INDEX: 42.66 KG/M2 | HEIGHT: 72 IN | DIASTOLIC BLOOD PRESSURE: 88 MMHG
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
## 2020-09-26 DIAGNOSIS — S83.92XA SPRAIN OF LEFT KNEE, UNSPECIFIED LIGAMENT, INITIAL ENCOUNTER: ICD-10-CM

## 2020-09-26 DIAGNOSIS — S30.1XXA CONTUSION OF ABDOMINAL WALL, INITIAL ENCOUNTER: ICD-10-CM

## 2020-09-26 LAB
ABO/RH: NORMAL
ABO/RH: NORMAL
ALBUMIN SERPL-MCNC: 4.2 G/DL (ref 3.5–5.2)
ALP BLD-CCNC: 71 U/L (ref 40–129)
ALT SERPL-CCNC: 34 U/L (ref 0–40)
ANION GAP SERPL CALCULATED.3IONS-SCNC: 17 MMOL/L (ref 7–16)
ANTIBODY SCREEN: NORMAL
APTT: 22.4 SEC (ref 24.5–35.1)
AST SERPL-CCNC: 37 U/L (ref 0–39)
BASOPHILS ABSOLUTE: 0.04 E9/L (ref 0–0.2)
BASOPHILS RELATIVE PERCENT: 0.3 % (ref 0–2)
BILIRUB SERPL-MCNC: 0.5 MG/DL (ref 0–1.2)
BUN BLDV-MCNC: 14 MG/DL (ref 6–20)
CALCIUM SERPL-MCNC: 9.4 MG/DL (ref 8.6–10.2)
CHLORIDE BLD-SCNC: 100 MMOL/L (ref 98–107)
CO2: 20 MMOL/L (ref 22–29)
CREAT SERPL-MCNC: 1.2 MG/DL (ref 0.7–1.2)
EKG ATRIAL RATE: 73 BPM
EKG P AXIS: 27 DEGREES
EKG P-R INTERVAL: 158 MS
EKG Q-T INTERVAL: 434 MS
EKG QRS DURATION: 88 MS
EKG QTC CALCULATION (BAZETT): 478 MS
EKG R AXIS: -6 DEGREES
EKG T AXIS: 154 DEGREES
EKG VENTRICULAR RATE: 73 BPM
EOSINOPHILS ABSOLUTE: 0.09 E9/L (ref 0.05–0.5)
EOSINOPHILS RELATIVE PERCENT: 0.8 % (ref 0–6)
GFR AFRICAN AMERICAN: >60
GFR NON-AFRICAN AMERICAN: >60 ML/MIN/1.73
GLUCOSE BLD-MCNC: 143 MG/DL (ref 74–99)
HCT VFR BLD CALC: 42.9 % (ref 37–54)
HEMOGLOBIN: 14.6 G/DL (ref 12.5–16.5)
IMMATURE GRANULOCYTES #: 0.09 E9/L
IMMATURE GRANULOCYTES %: 0.8 % (ref 0–5)
INR BLD: 1
LACTIC ACID: 1.2 MMOL/L (ref 0.5–2.2)
LYMPHOCYTES ABSOLUTE: 1.44 E9/L (ref 1.5–4)
LYMPHOCYTES RELATIVE PERCENT: 12.4 % (ref 20–42)
MCH RBC QN AUTO: 32.7 PG (ref 26–35)
MCHC RBC AUTO-ENTMCNC: 34 % (ref 32–34.5)
MCV RBC AUTO: 96 FL (ref 80–99.9)
MONOCYTES ABSOLUTE: 0.67 E9/L (ref 0.1–0.95)
MONOCYTES RELATIVE PERCENT: 5.8 % (ref 2–12)
NEUTROPHILS ABSOLUTE: 9.29 E9/L (ref 1.8–7.3)
NEUTROPHILS RELATIVE PERCENT: 79.9 % (ref 43–80)
PDW BLD-RTO: 13.9 FL (ref 11.5–15)
PLATELET # BLD: 186 E9/L (ref 130–450)
PMV BLD AUTO: 10.8 FL (ref 7–12)
POTASSIUM SERPL-SCNC: 3.7 MMOL/L (ref 3.5–5)
PROTHROMBIN TIME: 11.7 SEC (ref 9.3–12.4)
RBC # BLD: 4.47 E12/L (ref 3.8–5.8)
REASON FOR REJECTION: NORMAL
REASON FOR REJECTION: NORMAL
REJECTED TEST: NORMAL
REJECTED TEST: NORMAL
SODIUM BLD-SCNC: 137 MMOL/L (ref 132–146)
TOTAL PROTEIN: 7.3 G/DL (ref 6.4–8.3)
WBC # BLD: 11.6 E9/L (ref 4.5–11.5)

## 2020-09-26 PROCEDURE — 6360000004 HC RX CONTRAST MEDICATION: Performed by: RADIOLOGY

## 2020-09-26 PROCEDURE — 86850 RBC ANTIBODY SCREEN: CPT

## 2020-09-26 PROCEDURE — 83605 ASSAY OF LACTIC ACID: CPT

## 2020-09-26 PROCEDURE — 93005 ELECTROCARDIOGRAM TRACING: CPT | Performed by: NURSE PRACTITIONER

## 2020-09-26 PROCEDURE — 86900 BLOOD TYPING SEROLOGIC ABO: CPT

## 2020-09-26 PROCEDURE — 73562 X-RAY EXAM OF KNEE 3: CPT

## 2020-09-26 PROCEDURE — 72125 CT NECK SPINE W/O DYE: CPT

## 2020-09-26 PROCEDURE — 85730 THROMBOPLASTIN TIME PARTIAL: CPT

## 2020-09-26 PROCEDURE — 80053 COMPREHEN METABOLIC PANEL: CPT

## 2020-09-26 PROCEDURE — 36415 COLL VENOUS BLD VENIPUNCTURE: CPT

## 2020-09-26 PROCEDURE — 99285 EMERGENCY DEPT VISIT HI MDM: CPT

## 2020-09-26 PROCEDURE — 6360000002 HC RX W HCPCS: Performed by: EMERGENCY MEDICINE

## 2020-09-26 PROCEDURE — 73552 X-RAY EXAM OF FEMUR 2/>: CPT

## 2020-09-26 PROCEDURE — 71260 CT THORAX DX C+: CPT

## 2020-09-26 PROCEDURE — 96374 THER/PROPH/DIAG INJ IV PUSH: CPT

## 2020-09-26 PROCEDURE — 70450 CT HEAD/BRAIN W/O DYE: CPT

## 2020-09-26 PROCEDURE — 86901 BLOOD TYPING SEROLOGIC RH(D): CPT

## 2020-09-26 PROCEDURE — 74177 CT ABD & PELVIS W/CONTRAST: CPT

## 2020-09-26 PROCEDURE — 6370000000 HC RX 637 (ALT 250 FOR IP): Performed by: EMERGENCY MEDICINE

## 2020-09-26 PROCEDURE — 85610 PROTHROMBIN TIME: CPT

## 2020-09-26 PROCEDURE — 93010 ELECTROCARDIOGRAM REPORT: CPT | Performed by: INTERNAL MEDICINE

## 2020-09-26 PROCEDURE — 85025 COMPLETE CBC W/AUTO DIFF WBC: CPT

## 2020-09-26 RX ORDER — ONDANSETRON 2 MG/ML
INJECTION INTRAMUSCULAR; INTRAVENOUS
Status: DISCONTINUED
Start: 2020-09-26 | End: 2020-09-26 | Stop reason: HOSPADM

## 2020-09-26 RX ORDER — FENTANYL CITRATE 50 UG/ML
INJECTION, SOLUTION INTRAMUSCULAR; INTRAVENOUS
Status: DISCONTINUED
Start: 2020-09-26 | End: 2020-09-26 | Stop reason: HOSPADM

## 2020-09-26 RX ORDER — LIDOCAINE 4 G/G
1 PATCH TOPICAL DAILY
Status: DISCONTINUED | OUTPATIENT
Start: 2020-09-26 | End: 2020-09-26 | Stop reason: HOSPADM

## 2020-09-26 RX ADMIN — IOPAMIDOL 110 ML: 755 INJECTION, SOLUTION INTRAVENOUS at 03:58

## 2020-09-26 RX ADMIN — HYDROMORPHONE HYDROCHLORIDE 0.5 MG: 1 INJECTION, SOLUTION INTRAMUSCULAR; INTRAVENOUS; SUBCUTANEOUS at 08:04

## 2020-09-26 ASSESSMENT — PAIN DESCRIPTION - PAIN TYPE: TYPE: ACUTE PAIN

## 2020-09-26 ASSESSMENT — PAIN DESCRIPTION - FREQUENCY: FREQUENCY: CONTINUOUS

## 2020-09-26 ASSESSMENT — PAIN SCALES - GENERAL: PAINLEVEL_OUTOF10: 10

## 2020-09-26 ASSESSMENT — PAIN DESCRIPTION - PROGRESSION: CLINICAL_PROGRESSION: GRADUALLY WORSENING

## 2020-09-26 ASSESSMENT — PAIN DESCRIPTION - ONSET: ONSET: SUDDEN
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
## 2020-09-29 NOTE — ED NOTES
Patient medicated with 0.5 mg Dilaudid via IV per verbal order of Dr Li Artist.        Richy Ross RN  09/28/20 0536

## 2020-11-03 PROBLEM — I10 ESSENTIAL HYPERTENSION: Status: RESOLVED | Noted: 2020-11-03 | Resolved: 2020-11-03

## 2020-12-28 ENCOUNTER — APPOINTMENT (OUTPATIENT)
Dept: GENERAL RADIOLOGY | Age: 55
End: 2020-12-28
Payer: MEDICARE

## 2020-12-28 LAB
ALBUMIN SERPL-MCNC: 4.3 G/DL (ref 3.5–5.2)
ALP BLD-CCNC: 80 U/L (ref 40–129)
ALT SERPL-CCNC: 32 U/L (ref 0–40)
ANION GAP SERPL CALCULATED.3IONS-SCNC: 11 MMOL/L (ref 7–16)
AST SERPL-CCNC: 28 U/L (ref 0–39)
BACTERIA: NORMAL /HPF
BASOPHILS ABSOLUTE: 0.04 E9/L (ref 0–0.2)
BASOPHILS RELATIVE PERCENT: 0.4 % (ref 0–2)
BILIRUB SERPL-MCNC: 0.5 MG/DL (ref 0–1.2)
BILIRUBIN URINE: NEGATIVE
BLOOD, URINE: NEGATIVE
BUN BLDV-MCNC: 16 MG/DL (ref 6–20)
CALCIUM SERPL-MCNC: 9.1 MG/DL (ref 8.6–10.2)
CHLORIDE BLD-SCNC: 104 MMOL/L (ref 98–107)
CLARITY: CLEAR
CO2: 24 MMOL/L (ref 22–29)
COLOR: YELLOW
CREAT SERPL-MCNC: 1.1 MG/DL (ref 0.7–1.2)
EOSINOPHILS ABSOLUTE: 0.26 E9/L (ref 0.05–0.5)
EOSINOPHILS RELATIVE PERCENT: 2.9 % (ref 0–6)
GFR AFRICAN AMERICAN: >60
GFR NON-AFRICAN AMERICAN: >60 ML/MIN/1.73
GLUCOSE BLD-MCNC: 92 MG/DL (ref 74–99)
GLUCOSE URINE: NEGATIVE MG/DL
HCT VFR BLD CALC: 45.9 % (ref 37–54)
HEMOGLOBIN: 14.8 G/DL (ref 12.5–16.5)
IMMATURE GRANULOCYTES #: 0.02 E9/L
IMMATURE GRANULOCYTES %: 0.2 % (ref 0–5)
KETONES, URINE: NEGATIVE MG/DL
LEUKOCYTE ESTERASE, URINE: NEGATIVE
LYMPHOCYTES ABSOLUTE: 1.06 E9/L (ref 1.5–4)
LYMPHOCYTES RELATIVE PERCENT: 11.9 % (ref 20–42)
MAGNESIUM: 2.1 MG/DL (ref 1.6–2.6)
MCH RBC QN AUTO: 31.4 PG (ref 26–35)
MCHC RBC AUTO-ENTMCNC: 32.2 % (ref 32–34.5)
MCV RBC AUTO: 97.5 FL (ref 80–99.9)
MONOCYTES ABSOLUTE: 0.69 E9/L (ref 0.1–0.95)
MONOCYTES RELATIVE PERCENT: 7.7 % (ref 2–12)
NEUTROPHILS ABSOLUTE: 6.86 E9/L (ref 1.8–7.3)
NEUTROPHILS RELATIVE PERCENT: 76.9 % (ref 43–80)
NITRITE, URINE: NEGATIVE
PDW BLD-RTO: 13.5 FL (ref 11.5–15)
PH UA: 6 (ref 5–9)
PLATELET # BLD: 171 E9/L (ref 130–450)
PMV BLD AUTO: 9.4 FL (ref 7–12)
POTASSIUM SERPL-SCNC: 3.9 MMOL/L (ref 3.5–5)
PRO-BNP: 1131 PG/ML (ref 0–125)
PROTEIN UA: NORMAL MG/DL
RBC # BLD: 4.71 E12/L (ref 3.8–5.8)
RBC UA: NORMAL /HPF (ref 0–2)
SARS-COV-2, NAAT: NOT DETECTED
SODIUM BLD-SCNC: 139 MMOL/L (ref 132–146)
SPECIFIC GRAVITY UA: 1.02 (ref 1–1.03)
TOTAL PROTEIN: 7 G/DL (ref 6.4–8.3)
UROBILINOGEN, URINE: 0.2 E.U./DL
WBC # BLD: 8.9 E9/L (ref 4.5–11.5)
WBC UA: NORMAL /HPF (ref 0–5)

## 2020-12-28 PROCEDURE — 71046 X-RAY EXAM CHEST 2 VIEWS: CPT

## 2020-12-28 PROCEDURE — U0002 COVID-19 LAB TEST NON-CDC: HCPCS

## 2020-12-28 PROCEDURE — 83880 ASSAY OF NATRIURETIC PEPTIDE: CPT

## 2020-12-28 PROCEDURE — 99285 EMERGENCY DEPT VISIT HI MDM: CPT

## 2020-12-28 PROCEDURE — 94640 AIRWAY INHALATION TREATMENT: CPT

## 2020-12-28 PROCEDURE — 6370000000 HC RX 637 (ALT 250 FOR IP): Performed by: NURSE PRACTITIONER

## 2020-12-28 PROCEDURE — 83735 ASSAY OF MAGNESIUM: CPT

## 2020-12-28 PROCEDURE — 85025 COMPLETE CBC W/AUTO DIFF WBC: CPT

## 2020-12-28 PROCEDURE — 80053 COMPREHEN METABOLIC PANEL: CPT

## 2020-12-28 PROCEDURE — 81001 URINALYSIS AUTO W/SCOPE: CPT

## 2020-12-28 RX ORDER — IPRATROPIUM BROMIDE AND ALBUTEROL SULFATE 2.5; .5 MG/3ML; MG/3ML
1 SOLUTION RESPIRATORY (INHALATION) ONCE
Status: COMPLETED | OUTPATIENT
Start: 2020-12-28 | End: 2020-12-28

## 2020-12-28 RX ADMIN — IPRATROPIUM BROMIDE AND ALBUTEROL SULFATE 1 AMPULE: .5; 2.5 SOLUTION RESPIRATORY (INHALATION) at 22:08

## 2020-12-29 ENCOUNTER — APPOINTMENT (OUTPATIENT)
Dept: GENERAL RADIOLOGY | Age: 55
End: 2020-12-29
Payer: MEDICARE

## 2020-12-29 ENCOUNTER — HOSPITAL ENCOUNTER (EMERGENCY)
Age: 55
Discharge: HOME | End: 2020-12-29
Attending: EMERGENCY MEDICINE
Payer: MEDICARE

## 2020-12-29 ENCOUNTER — HOSPITAL ENCOUNTER (EMERGENCY)
Age: 55
Discharge: LEFT BEFORE DISCHARGE | End: 2020-12-29
Payer: MEDICARE

## 2020-12-29 VITALS
BODY MASS INDEX: 42.66 KG/M2 | WEIGHT: 315 LBS | DIASTOLIC BLOOD PRESSURE: 114 MMHG | TEMPERATURE: 98.4 F | RESPIRATION RATE: 28 BRPM | SYSTOLIC BLOOD PRESSURE: 187 MMHG | HEIGHT: 72 IN | OXYGEN SATURATION: 98 % | HEART RATE: 77 BPM

## 2020-12-29 VITALS
SYSTOLIC BLOOD PRESSURE: 196 MMHG | OXYGEN SATURATION: 93 % | RESPIRATION RATE: 19 BRPM | DIASTOLIC BLOOD PRESSURE: 121 MMHG | HEART RATE: 79 BPM | TEMPERATURE: 95.5 F

## 2020-12-29 LAB
ALBUMIN SERPL-MCNC: 4.4 G/DL (ref 3.5–5.2)
ALP BLD-CCNC: 84 U/L (ref 40–129)
ALT SERPL-CCNC: 30 U/L (ref 0–40)
ANION GAP SERPL CALCULATED.3IONS-SCNC: 10 MMOL/L (ref 7–16)
AST SERPL-CCNC: 26 U/L (ref 0–39)
BILIRUB SERPL-MCNC: 0.5 MG/DL (ref 0–1.2)
BUN BLDV-MCNC: 16 MG/DL (ref 6–20)
CALCIUM SERPL-MCNC: 9.4 MG/DL (ref 8.6–10.2)
CHLORIDE BLD-SCNC: 104 MMOL/L (ref 98–107)
CO2: 26 MMOL/L (ref 22–29)
CREAT SERPL-MCNC: 1.1 MG/DL (ref 0.7–1.2)
EKG ATRIAL RATE: 77 BPM
EKG P AXIS: 48 DEGREES
EKG P-R INTERVAL: 174 MS
EKG Q-T INTERVAL: 414 MS
EKG QRS DURATION: 86 MS
EKG QTC CALCULATION (BAZETT): 468 MS
EKG R AXIS: -25 DEGREES
EKG T AXIS: 126 DEGREES
EKG VENTRICULAR RATE: 77 BPM
GFR AFRICAN AMERICAN: >60
GFR NON-AFRICAN AMERICAN: >60 ML/MIN/1.73
GLUCOSE BLD-MCNC: 94 MG/DL (ref 74–99)
HCT VFR BLD CALC: 45 % (ref 37–54)
HEMOGLOBIN: 14.7 G/DL (ref 12.5–16.5)
MCH RBC QN AUTO: 31.9 PG (ref 26–35)
MCHC RBC AUTO-ENTMCNC: 32.7 % (ref 32–34.5)
MCV RBC AUTO: 97.6 FL (ref 80–99.9)
PDW BLD-RTO: 13.6 FL (ref 11.5–15)
PLATELET # BLD: 171 E9/L (ref 130–450)
PMV BLD AUTO: 9.4 FL (ref 7–12)
POTASSIUM SERPL-SCNC: 4 MMOL/L (ref 3.5–5)
PRO-BNP: 1619 PG/ML (ref 0–125)
RBC # BLD: 4.61 E12/L (ref 3.8–5.8)
SODIUM BLD-SCNC: 140 MMOL/L (ref 132–146)
TOTAL PROTEIN: 7.2 G/DL (ref 6.4–8.3)
TROPONIN: <0.01 NG/ML (ref 0–0.03)
WBC # BLD: 7.5 E9/L (ref 4.5–11.5)

## 2020-12-29 PROCEDURE — 93010 ELECTROCARDIOGRAM REPORT: CPT | Performed by: INTERNAL MEDICINE

## 2020-12-29 PROCEDURE — 85027 COMPLETE CBC AUTOMATED: CPT

## 2020-12-29 PROCEDURE — 84484 ASSAY OF TROPONIN QUANT: CPT

## 2020-12-29 PROCEDURE — 4500000002 HC ER NO CHARGE

## 2020-12-29 PROCEDURE — 6370000000 HC RX 637 (ALT 250 FOR IP): Performed by: EMERGENCY MEDICINE

## 2020-12-29 PROCEDURE — 83880 ASSAY OF NATRIURETIC PEPTIDE: CPT

## 2020-12-29 PROCEDURE — 93005 ELECTROCARDIOGRAM TRACING: CPT | Performed by: NURSE PRACTITIONER

## 2020-12-29 PROCEDURE — 80053 COMPREHEN METABOLIC PANEL: CPT

## 2020-12-29 RX ORDER — FUROSEMIDE 20 MG/1
40 TABLET ORAL ONCE
Status: COMPLETED | OUTPATIENT
Start: 2020-12-29 | End: 2020-12-29

## 2020-12-29 RX ORDER — NITROGLYCERIN 0.4 MG/1
0.4 TABLET SUBLINGUAL ONCE
Status: COMPLETED | OUTPATIENT
Start: 2020-12-29 | End: 2020-12-29

## 2020-12-29 RX ADMIN — NITROGLYCERIN 0.4 MG: 0.4 TABLET, ORALLY DISINTEGRATING SUBLINGUAL at 05:26

## 2020-12-29 RX ADMIN — FUROSEMIDE 40 MG: 20 TABLET ORAL at 05:26

## 2020-12-29 ASSESSMENT — PAIN SCALES - GENERAL: PAINLEVEL_OUTOF10: 9

## 2020-12-29 ASSESSMENT — PAIN DESCRIPTION - LOCATION: LOCATION: HEAD

## 2020-12-29 NOTE — ED NOTES
Bed: HD  Expected date:   Expected time:   Means of arrival:   Comments:  triage     Angela Whitman RN  12/29/20 6415

## 2020-12-29 NOTE — ED NOTES
FIRST PROVIDER CONTACT ASSESSMENT NOTE      Department of Emergency Medicine   Admit Date: No admission date for patient encounter. Chief Complaint: Shortness of Breath (starting friday, cough and congestion)      History of Present Illness:    Concetta Christianson is a 50 y.o. male who presents to the ED for shortness of breath, cough with concern for Covid.   Blood pressure is elevated did not take medication as prescribed today         -----------------END OF FIRST PROVIDER CONTACT ASSESSMENT NOTE--------------  Electronically signed by YUMIKO Wood CNP   DD: 12/28/20               YUMIKO May CNP  12/28/20 1955
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
## 2021-01-11 ENCOUNTER — OFFICE VISIT (OUTPATIENT)
Dept: CARDIOLOGY CLINIC | Age: 56
End: 2021-01-11
Payer: MEDICARE

## 2021-01-11 VITALS
WEIGHT: 315 LBS | SYSTOLIC BLOOD PRESSURE: 122 MMHG | BODY MASS INDEX: 42.66 KG/M2 | RESPIRATION RATE: 20 BRPM | HEART RATE: 68 BPM | DIASTOLIC BLOOD PRESSURE: 100 MMHG | HEIGHT: 72 IN

## 2021-01-11 DIAGNOSIS — I25.10 CORONARY ARTERY DISEASE INVOLVING NATIVE CORONARY ARTERY OF NATIVE HEART WITHOUT ANGINA PECTORIS: Primary | ICD-10-CM

## 2021-01-11 PROCEDURE — 93000 ELECTROCARDIOGRAM COMPLETE: CPT | Performed by: INTERNAL MEDICINE

## 2021-01-11 PROCEDURE — 99215 OFFICE O/P EST HI 40 MIN: CPT | Performed by: INTERNAL MEDICINE

## 2021-01-11 RX ORDER — FUROSEMIDE 40 MG/1
TABLET ORAL
COMMUNITY
Start: 2021-01-04 | End: 2022-04-14 | Stop reason: CLARIF

## 2021-01-11 RX ORDER — AMLODIPINE BESYLATE 10 MG/1
10 TABLET ORAL DAILY
Qty: 90 TABLET | Refills: 5 | Status: SHIPPED | OUTPATIENT
Start: 2021-01-11

## 2021-01-11 RX ORDER — BECLOMETHASONE DIPROPIONATE HFA 80 UG/1
AEROSOL, METERED RESPIRATORY (INHALATION)
COMMUNITY
Start: 2021-01-06

## 2021-01-11 RX ORDER — TRAZODONE HYDROCHLORIDE 50 MG/1
TABLET ORAL
COMMUNITY
Start: 2020-12-30 | End: 2022-04-14

## 2021-01-11 NOTE — PROGRESS NOTES
Chief Complaint   Patient presents with    Congestive Heart Failure    Coronary Artery Disease    Hypertension       Patient Active Problem List    Diagnosis Date Noted    Coronary artery disease involving native coronary artery of native heart without angina pectoris 01/11/2021     Overview Note:     A. PCI of RCA with two LEANNA ([place]): 9/13/2019      Tobacco abuse 11/22/2018    TIA (transient ischemic attack) 06/25/2018    (HFpEF) heart failure with preserved ejection fraction (HCC)     HTN (hypertension) 05/05/2017    Chronic kidney disease, stage II (mild) 03/06/2017    Pure hypercholesterolemia 03/06/2017    Obstructive sleep apnea 03/05/2017       Current Outpatient Medications   Medication Sig Dispense Refill    QVAR REDIHALER 80 MCG/ACT AERB inhaler INHALE ONE PUFF BY MOUTH EVERY 12 HOURS WITH SPACER      furosemide (LASIX) 40 MG tablet TAKE ONE TABLET BY MOUTH EVERY DAY      traZODone (DESYREL) 50 MG tablet TAKE TWO TABLETS BY MOUTH EVERY DAY      amLODIPine (NORVASC) 10 MG tablet Take 1 tablet by mouth daily 90 tablet 5    cloNIDine (CATAPRES) 0.1 MG tablet Take 1 tablet by mouth three times daily 90 tablet 3    albuterol (PROVENTIL) (2.5 MG/3ML) 0.083% nebulizer solution Take 3 mLs by nebulization every 6 hours as needed for Wheezing 120 each 3    metoprolol succinate (TOPROL XL) 50 MG extended release tablet Take 1 tablet by mouth 2 times daily 60 tablet 3    aspirin 81 MG tablet Take 81 mg by mouth daily       budesonide-formoterol (SYMBICORT) 160-4.5 MCG/ACT AERO Inhale 2 puffs into the lungs 2 times daily (Patient not taking: Reported on 1/11/2021) 1 Inhaler 3    predniSONE (DELTASONE) 10 MG tablet One tablet 3 times daily x 3 days, then twice daily x 3 days, then once daily.  (Patient not taking: Reported on 1/11/2021) 18 tablet 0    atorvastatin (LIPITOR) 40 MG tablet Take 1 tablet by mouth daily (Patient not taking: Reported on 1/11/2021) 90 tablet 1    ticagrelor (BRILINTA) 90 MG TABS tablet Take 1 tablet by mouth 2 times daily (Patient not taking: Reported on 1/11/2021) 60 tablet 1    ranitidine (ZANTAC) 300 MG tablet Take 1 tablet by mouth nightly (Patient not taking: Reported on 1/11/2021) 30 tablet 3    losartan (COZAAR) 100 MG tablet Take 1 tablet by mouth nightly (Patient not taking: Reported on 1/11/2021) 30 tablet 2     No current facility-administered medications for this visit. Allergies   Allergen Reactions    Pcn [Penicillins] Other (See Comments)     As a child, does not remembered       Vitals:    01/11/21 0808   BP: (!) 122/100   Pulse: 68   Resp: 20   Weight: (!) 371 lb 12.8 oz (168.6 kg)   Height: 6' (1.829 m)       Social History     Socioeconomic History    Marital status: Single     Spouse name: Not on file    Number of children: Not on file    Years of education: Not on file    Highest education level: Not on file   Occupational History    Not on file   Social Needs    Financial resource strain: Not on file    Food insecurity     Worry: Not on file     Inability: Not on file    Transportation needs     Medical: Not on file     Non-medical: Not on file   Tobacco Use    Smoking status: Current Every Day Smoker     Packs/day: 0.25     Years: 33.00     Pack years: 8.25     Types: Cigarettes    Smokeless tobacco: Never Used   Substance and Sexual Activity    Alcohol use:  Yes     Alcohol/week: 6.0 standard drinks     Types: 6 Cans of beer per week     Comment: social    Drug use: Yes     Types: Marijuana     Comment: once a week    Sexual activity: Not on file   Lifestyle    Physical activity     Days per week: Not on file     Minutes per session: Not on file    Stress: Not on file   Relationships    Social connections     Talks on phone: Not on file     Gets together: Not on file     Attends Mu-ism service: Not on file     Active member of club or organization: Not on file     Attends meetings of clubs or organizations: Not on file Relationship status: Not on file    Intimate partner violence     Fear of current or ex partner: Not on file     Emotionally abused: Not on file     Physically abused: Not on file     Forced sexual activity: Not on file   Other Topics Concern    Not on file   Social History Narrative    Drinks occ [employer]. Family History   Problem Relation Age of Onset    Heart Disease Mother     Other Father     No Known Problems Sister     No Known Problems Brother     No Known Problems Sister     No Known Problems Brother          SUBJECTIVE: Marko Cedeno presents to the office today for re-evaluation of cardiac diagnoses. Extensive cardiac hx. I reviwed old records. He complains of dyspnea and that made him visit ED 12/28, had BNP of 1131 and CXR read as chf. BP elevated, sent home on lasix after he refused admission and denies   chest pain, orthopnea and paroxysmal nocturnal dyspnea. He is not compliant with meds as suggested in past.   Carries a diagnosis of HFpEF by dr. Ana Maria Vora, last visit 2018 with her was 20 lbs lighter, and euvolemic. Had a PCI of RCA in 2019        Review of Systems:   Heart: as above   Lungs: as above   Eyes: denies changes in vision or discharge. Ears: denies changes in hearing or pain. Nose: denies epistaxis or masses   Throat: denies sore throat or trouble swallowing. Neuro: denies numbness, tingling, tremors. Skin: denies rashes or itching. : denies hematuria, dysuria   GI: denies vomiting, diarrhea   Psych: denies mood changed, anxiety, depression. all others negative. Physical Exam   BP (!) 122/100   Pulse 68   Resp 20   Ht 6' (1.829 m)   Wt (!) 371 lb 12.8 oz (168.6 kg)   BMI 50.43 kg/m²   Constitutional: Oriented to person, place, and time. Obese No distress. Head: Normocephalic and atraumatic. Eyes: EOM are normal. Pupils are equal, round, and reactive to light. Neck: Normal range of motion. Neck supple. No hepatojugular reflux and no JVD present. Carotid bruit is not present. No tracheal deviation present. No thyromegaly present. Cardiovascular: Normal rate, regular rhythm, normal heart sounds and intact distal pulses. Exam reveals no gallop and no friction rub. No murmur heard. Pulmonary/Chest: Effort normal and breath sounds normal. No respiratory distress. No wheezes. No rales. No tenderness. Abdominal: Soft. Bowel sounds are normal. No distension and no mass. No tenderness. No rebound and no guarding. Musculoskeletal: Normal range of motion. No edema and no tenderness. Neurological: Alert and oriented to person, place, and time. Skin: Skin is warm and dry. No rash noted. Not diaphoretic. No erythema. Psychiatric: Normal mood and affect. Behavior is normal.     EKG:  normal sinus rhythm, nonspecific ST and T waves changes, unchanged from previous tracings. ASSESSMENT AND PLAN:  Patient Active Problem List   Diagnosis    Obstructive sleep apnea: compliant with CPAP    Chronic kidney disease, stage II (mild)    Pure hypercholesterolemia: not compliant with statin. Emphasized need for high intensity statin given know CAD,     HTN (hypertension): will get him back on proper regimen - stop cloniidine, restart losartan 100mg qd, add amlodipine 10mg qd    (HFpEF) heart failure with preserved ejection fraction :  Stage C, NYHA III. euvolemic today. Will likely add MRA after ARB and BMP. Discussed low salt diet    TIA (transient ischemic attack)    Tobacco abuse: counseled on cessation        * Coronary artery disease: S/P PCI to RCA with disease in other territories. No angina now, no new ekg changes  Morbid Obesity: needs bariatric surgery to have any chance of controlling cardiac disease processes           The patient was educated as to the symptoms that would require a prompt return to our office. Return visit in 2 weeks.     Pablo Santamaria M.D  [id number] [facility]

## 2021-03-05 ENCOUNTER — CLINICAL DOCUMENTATION (OUTPATIENT)
Dept: NUTRITION | Age: 56
End: 2021-03-05

## 2021-03-05 NOTE — PROGRESS NOTES
Patient No Show      Scheduled Date: 3/5/2021    Patient: Chan Eagle    Referring Clinician: William Gregory NP       Dear Karrie Cano,    Thank you for referring Chan Eagle to [place] [id number] for outpatient nutrition counseling. Chan Eagle did not keep scheduled appointment on 3/5/2021. I reminded him yesterday of his appointment, and he did not show. If he is interested in nutrition counseling, he must contact me. If I can be of further assistance with this patient, please contact me.      Thank you,    James Garnett RD, [facility]  Outpatient Dietitian   Phone: [phone]  Fax: [phone]

## 2021-07-17 ENCOUNTER — HOSPITAL ENCOUNTER (EMERGENCY)
Age: 56
Discharge: HOME | End: 2021-07-17
Payer: MEDICARE

## 2021-07-17 VITALS
RESPIRATION RATE: 20 BRPM | DIASTOLIC BLOOD PRESSURE: 62 MMHG | HEART RATE: 95 BPM | SYSTOLIC BLOOD PRESSURE: 108 MMHG | OXYGEN SATURATION: 99 % | TEMPERATURE: 97.3 F

## 2021-07-17 DIAGNOSIS — L50.9 URTICARIA: Primary | ICD-10-CM

## 2021-07-17 PROCEDURE — 99283 EMERGENCY DEPT VISIT LOW MDM: CPT

## 2021-07-17 PROCEDURE — 6370000000 HC RX 637 (ALT 250 FOR IP): Performed by: NURSE PRACTITIONER

## 2021-07-17 RX ORDER — PREDNISONE 10 MG/1
40 TABLET ORAL DAILY
Qty: 20 TABLET | Refills: 0 | Status: SHIPPED | OUTPATIENT
Start: 2021-07-17 | End: 2021-07-22

## 2021-07-17 RX ORDER — PREDNISONE 20 MG/1
40 TABLET ORAL ONCE
Status: COMPLETED | OUTPATIENT
Start: 2021-07-17 | End: 2021-07-17

## 2021-07-17 RX ADMIN — PREDNISONE 40 MG: 20 TABLET ORAL at 02:09

## 2021-07-17 NOTE — ED PROVIDER NOTES
[address]  Department of Emergency Medicine   ED  Encounter Note  Admit Date/RoomTime: 2021  2:00 AM  ED Room: [place] [address]    NAME: Henri Negrete  : [id number]  MRN: [id number]     Chief Complaint:  Rash (hives on arms/hands/abdomen , unknown cause )    History of Present Illness       Henri Negrete is a 49 y.o. old male who presents to the emergency department by private vehicle, for gradual onset of red, raised and itchy area on extremities and torso which began 1 day(s) prior to arrival.  The symptoms were caused by unknown cause. Since onset the symptoms have been waxing and waning. Prior history of similar episodes: No.   His symptoms are associated with itching and partially/intermittently relieved by benadryl. He denies any difficulty breathing, difficulty swallowing, wheezing, throat tightness, hoarseness, stridor, lightheadedness, dizziness, facial swelling, lip swelling, tongue swelling, fever, chills, chest pain, abd pain or drainage. ROS   Pertinent positives and negatives are stated within HPI, all other systems reviewed and are negative. Past Medical History:  has a past medical history of Accelerated hypertension, Arthritis, CAD (coronary artery disease), Cerebral artery occlusion with cerebral infarction ([facility] [address] CHF (congestive heart failure) ([facility] [address] COPD (chronic obstructive pulmonary disease) ([facility] [address] Depression with anxiety, Fibromyalgia, GI bleeding, Hyperlipidemia, Hypertension, Myocardial infarct [facility]), Sleep apnea, and Urinary frequency. Surgical History:  has a past surgical history that includes Tonsillectomy; Colonoscopy (2017); Endoscopy, colon, diagnostic (2017); Cardiac catheterization (2017);  Cardiac catheterization (2018); pr [facility] incl fluor gdnce dx w/cell washg spx (N/A, 10/22/2018); pr laryngoscopy,dirct,op scope,biopsy (N/A, 2018); and Coronary angioplasty with stent (09/13/2019). Social History:  reports that he has been smoking cigarettes. He has a 8.25 pack-year smoking history. He has never used smokeless tobacco. He reports current alcohol use of about 6.0 standard drinks of alcohol per week. He reports current drug use. Drug: Marijuana. Family History: family history includes Heart Disease in his mother; No Known Problems in his brother, brother, sister, and sister; Other in his father. Allergies: Pcn [penicillins]    Physical Exam   Oxygen Saturation Interpretation: Normal.        ED Triage Vitals   BP Temp Temp Source Pulse Resp SpO2 Height Weight   07/17/21 0159 07/17/21 0149 07/17/21 0149 07/17/21 0149 07/17/21 0149 07/17/21 0149 -- --   108/62 97.3 °F (36.3 °C) Temporal 95 20 99 %           Constitutional:  Alert, development consistent with age. HEENT:  NC/NT. Airway patent. Eyes:  PERRL, EOMI, no discharge. Ears:  TMs without perforation, injection, or bulging. External canals clear without exudate. Mouth:  Mucous membranes moist without lesions, tongue and gums normal.  Throat:  Pharynx without injection, exudate, or tonsillar hypertrophy. Airway patient. Neck:  Supple. No lymphadenopathy. Respiratory:  Clear to auscultation and breath sounds equal.  CV:  Regular rate and rhythm. GI:  Abdomen Soft, nontender, +BS. Integument:  Skin turgor: Normal.              Generalized scattered mildly erythemic nontender wheals to extremities and torso. No target lesions, necrosis, or drainage. Neurological:  Orientation age-appropriate unless noted elseware. Motor functions intact. Lab / Imaging Results   (All laboratory and radiology results have been personally reviewed by myself)  Labs:  No results found for this visit on 07/17/21. Imaging: All Radiology results interpreted by Radiologist unless otherwise noted.   No orders to display       ED Course / Medical Decision Making     Medications   predniSONE (DELTASONE) tablet 40 mg (40 mg Oral Given 7/17/21 0203)        Consults:   None    Procedures:   none    MDM:   Patient has a clinical presentation of the urticaria. He has no signs of anaphylaxis or systemic illness. He appears well and nontoxic. He is initiated on prednisone in the emergency department and will be prescribed a burst for home. He is counseled on how steroids can elevate your blood glucose temporarily at home. He is instructed to continue over-the-counter Benadryl as directed. Is appropriate for discharge and outpatient follow-up. He is instructed to return to the emergency department any new or worsening symptoms. He is counseled on monitoring for possible triggers of his urticaria. Plan of Care/Counseling:  YUMIKO Jennings CNP reviewed today's visit with the patient in addition to providing specific details for the plan of care and counseling regarding the diagnosis and prognosis. Questions are answered at this time and are agreeable with the plan. Assessment      1. Urticaria      Plan   Discharged home. Patient condition is good    New Medications     New Prescriptions    PREDNISONE (DELTASONE) 10 MG TABLET    Take 4 tablets by mouth daily for 5 days     Electronically signed by YUMIKO Jennings CNP   DD: 7/17/21  **This report was transcribed using voice recognition software. Every effort was made to ensure accuracy; however, inadvertent computerized transcription errors may be present.   END OF ED PROVIDER NOTE       YUMIKO Martin CNP  07/17/21 0210  ATTENDING PROVIDER ATTESTATION:     Supervising Physician, on-site, available for consultation, non-participatory in the evaluation or care of this patient       Tricia Burch MD  07/17/21 5655

## 2021-08-15 ENCOUNTER — APPOINTMENT (OUTPATIENT)
Dept: GENERAL RADIOLOGY | Age: 56
End: 2021-08-15
Payer: MEDICARE

## 2021-08-15 LAB
ALBUMIN SERPL-MCNC: 4 G/DL (ref 3.5–5.2)
ALP BLD-CCNC: 96 U/L (ref 40–129)
ALT SERPL-CCNC: 42 U/L (ref 0–40)
ANION GAP SERPL CALCULATED.3IONS-SCNC: 9 MMOL/L (ref 7–16)
AST SERPL-CCNC: 41 U/L (ref 0–39)
BACTERIA: NORMAL /HPF
BASOPHILS ABSOLUTE: 0.04 E9/L (ref 0–0.2)
BASOPHILS RELATIVE PERCENT: 0.6 % (ref 0–2)
BILIRUB SERPL-MCNC: 0.3 MG/DL (ref 0–1.2)
BILIRUBIN URINE: NEGATIVE
BLOOD, URINE: NEGATIVE
BUN BLDV-MCNC: 15 MG/DL (ref 6–20)
CALCIUM SERPL-MCNC: 9.1 MG/DL (ref 8.6–10.2)
CHLORIDE BLD-SCNC: 104 MMOL/L (ref 98–107)
CLARITY: CLEAR
CO2: 26 MMOL/L (ref 22–29)
COLOR: YELLOW
CREAT SERPL-MCNC: 1 MG/DL (ref 0.7–1.2)
EOSINOPHILS ABSOLUTE: 0.16 E9/L (ref 0.05–0.5)
EOSINOPHILS RELATIVE PERCENT: 2.3 % (ref 0–6)
GFR AFRICAN AMERICAN: >60
GFR NON-AFRICAN AMERICAN: >60 ML/MIN/1.73
GLUCOSE BLD-MCNC: 185 MG/DL (ref 74–99)
GLUCOSE URINE: NEGATIVE MG/DL
HCT VFR BLD CALC: 41.8 % (ref 37–54)
HEMOGLOBIN: 14 G/DL (ref 12.5–16.5)
IMMATURE GRANULOCYTES #: 0.04 E9/L
IMMATURE GRANULOCYTES %: 0.6 % (ref 0–5)
KETONES, URINE: NEGATIVE MG/DL
LACTIC ACID: 1.5 MMOL/L (ref 0.5–2.2)
LEUKOCYTE ESTERASE, URINE: ABNORMAL
LIPASE: 40 U/L (ref 13–60)
LYMPHOCYTES ABSOLUTE: 1.93 E9/L (ref 1.5–4)
LYMPHOCYTES RELATIVE PERCENT: 27.9 % (ref 20–42)
MCH RBC QN AUTO: 32.4 PG (ref 26–35)
MCHC RBC AUTO-ENTMCNC: 33.5 % (ref 32–34.5)
MCV RBC AUTO: 96.8 FL (ref 80–99.9)
MONOCYTES ABSOLUTE: 0.51 E9/L (ref 0.1–0.95)
MONOCYTES RELATIVE PERCENT: 7.4 % (ref 2–12)
NEUTROPHILS ABSOLUTE: 4.23 E9/L (ref 1.8–7.3)
NEUTROPHILS RELATIVE PERCENT: 61.2 % (ref 43–80)
NITRITE, URINE: NEGATIVE
PDW BLD-RTO: 13.1 FL (ref 11.5–15)
PH UA: 6 (ref 5–9)
PLATELET # BLD: 169 E9/L (ref 130–450)
PMV BLD AUTO: 9 FL (ref 7–12)
POTASSIUM SERPL-SCNC: 4.1 MMOL/L (ref 3.5–5)
PRO-BNP: 590 PG/ML (ref 0–125)
PROTEIN UA: NEGATIVE MG/DL
RBC # BLD: 4.32 E12/L (ref 3.8–5.8)
RBC UA: NORMAL /HPF (ref 0–2)
SARS-COV-2, NAAT: NOT DETECTED
SODIUM BLD-SCNC: 139 MMOL/L (ref 132–146)
SPECIFIC GRAVITY UA: 1.02 (ref 1–1.03)
TOTAL PROTEIN: 7.1 G/DL (ref 6.4–8.3)
TROPONIN, HIGH SENSITIVITY: 35 NG/L (ref 0–11)
UROBILINOGEN, URINE: 0.2 E.U./DL
WBC # BLD: 6.9 E9/L (ref 4.5–11.5)
WBC UA: NORMAL /HPF (ref 0–5)

## 2021-08-15 PROCEDURE — 93005 ELECTROCARDIOGRAM TRACING: CPT | Performed by: NURSE PRACTITIONER

## 2021-08-15 PROCEDURE — 96374 THER/PROPH/DIAG INJ IV PUSH: CPT

## 2021-08-15 PROCEDURE — 83605 ASSAY OF LACTIC ACID: CPT

## 2021-08-15 PROCEDURE — 87635 SARS-COV-2 COVID-19 AMP PRB: CPT

## 2021-08-15 PROCEDURE — 81001 URINALYSIS AUTO W/SCOPE: CPT

## 2021-08-15 PROCEDURE — 80053 COMPREHEN METABOLIC PANEL: CPT

## 2021-08-15 PROCEDURE — 99284 EMERGENCY DEPT VISIT MOD MDM: CPT

## 2021-08-15 PROCEDURE — 83880 ASSAY OF NATRIURETIC PEPTIDE: CPT

## 2021-08-15 PROCEDURE — 84484 ASSAY OF TROPONIN QUANT: CPT

## 2021-08-15 PROCEDURE — 83690 ASSAY OF LIPASE: CPT

## 2021-08-15 PROCEDURE — 71045 X-RAY EXAM CHEST 1 VIEW: CPT

## 2021-08-15 PROCEDURE — 85025 COMPLETE CBC W/AUTO DIFF WBC: CPT

## 2021-08-15 ASSESSMENT — PAIN DESCRIPTION - PAIN TYPE: TYPE: ACUTE PAIN

## 2021-08-15 ASSESSMENT — PAIN DESCRIPTION - DESCRIPTORS: DESCRIPTORS: ACHING

## 2021-08-15 ASSESSMENT — PAIN SCALES - GENERAL: PAINLEVEL_OUTOF10: 7

## 2021-08-15 ASSESSMENT — PAIN DESCRIPTION - ORIENTATION: ORIENTATION: LEFT;RIGHT;MID

## 2021-08-16 ENCOUNTER — APPOINTMENT (OUTPATIENT)
Dept: CT IMAGING | Age: 56
End: 2021-08-16
Payer: MEDICARE

## 2021-08-16 ENCOUNTER — HOSPITAL ENCOUNTER (EMERGENCY)
Age: 56
Discharge: HOME | End: 2021-08-16
Attending: EMERGENCY MEDICINE
Payer: MEDICARE

## 2021-08-16 VITALS
SYSTOLIC BLOOD PRESSURE: 176 MMHG | BODY MASS INDEX: 42.66 KG/M2 | HEART RATE: 72 BPM | RESPIRATION RATE: 16 BRPM | TEMPERATURE: 97.8 F | WEIGHT: 315 LBS | OXYGEN SATURATION: 95 % | DIASTOLIC BLOOD PRESSURE: 102 MMHG | HEIGHT: 72 IN

## 2021-08-16 DIAGNOSIS — J06.9 ACUTE UPPER RESPIRATORY INFECTION: Primary | ICD-10-CM

## 2021-08-16 LAB
EKG ATRIAL RATE: 72 BPM
EKG P AXIS: 4 DEGREES
EKG P-R INTERVAL: 160 MS
EKG Q-T INTERVAL: 394 MS
EKG QRS DURATION: 86 MS
EKG QTC CALCULATION (BAZETT): 431 MS
EKG R AXIS: -19 DEGREES
EKG T AXIS: 178 DEGREES
EKG VENTRICULAR RATE: 72 BPM
TROPONIN, HIGH SENSITIVITY: 32 NG/L (ref 0–11)
TROPONIN, HIGH SENSITIVITY: 35 NG/L (ref 0–11)

## 2021-08-16 PROCEDURE — 84484 ASSAY OF TROPONIN QUANT: CPT

## 2021-08-16 PROCEDURE — 93010 ELECTROCARDIOGRAM REPORT: CPT | Performed by: INTERNAL MEDICINE

## 2021-08-16 PROCEDURE — 6360000004 HC RX CONTRAST MEDICATION: Performed by: RADIOLOGY

## 2021-08-16 PROCEDURE — 36415 COLL VENOUS BLD VENIPUNCTURE: CPT

## 2021-08-16 PROCEDURE — 6360000002 HC RX W HCPCS: Performed by: EMERGENCY MEDICINE

## 2021-08-16 PROCEDURE — 6370000000 HC RX 637 (ALT 250 FOR IP): Performed by: EMERGENCY MEDICINE

## 2021-08-16 PROCEDURE — 70491 CT SOFT TISSUE NECK W/DYE: CPT

## 2021-08-16 PROCEDURE — 71275 CT ANGIOGRAPHY CHEST: CPT

## 2021-08-16 PROCEDURE — 94664 DEMO&/EVAL PT USE INHALER: CPT

## 2021-08-16 RX ORDER — METHYLPREDNISOLONE 4 MG/1
TABLET ORAL
Qty: 1 KIT | Refills: 0 | Status: SHIPPED | OUTPATIENT
Start: 2021-08-16 | End: 2021-08-22

## 2021-08-16 RX ORDER — IPRATROPIUM BROMIDE AND ALBUTEROL SULFATE 2.5; .5 MG/3ML; MG/3ML
1 SOLUTION RESPIRATORY (INHALATION) ONCE
Status: COMPLETED | OUTPATIENT
Start: 2021-08-16 | End: 2021-08-16

## 2021-08-16 RX ORDER — ALBUTEROL SULFATE 90 UG/1
2 AEROSOL, METERED RESPIRATORY (INHALATION) 4 TIMES DAILY PRN
Qty: 1 INHALER | Refills: 0 | Status: SHIPPED | OUTPATIENT
Start: 2021-08-16

## 2021-08-16 RX ADMIN — HYDROMORPHONE HYDROCHLORIDE 1 MG: 1 INJECTION, SOLUTION INTRAMUSCULAR; INTRAVENOUS; SUBCUTANEOUS at 01:18

## 2021-08-16 RX ADMIN — IPRATROPIUM BROMIDE AND ALBUTEROL SULFATE 1 AMPULE: .5; 2.5 SOLUTION RESPIRATORY (INHALATION) at 00:44

## 2021-08-16 RX ADMIN — IOPAMIDOL 75 ML: 755 INJECTION, SOLUTION INTRAVENOUS at 02:13

## 2021-08-16 ASSESSMENT — PAIN SCALES - GENERAL: PAINLEVEL_OUTOF10: 7

## 2021-08-16 NOTE — ED NOTES
FIRST PROVIDER CONTACT ASSESSMENT NOTE      Department of Emergency Medicine   8/15/21  10:29 PM EDT    Chief Complaint: Cough (dry cough ongoing for one week.) and Hematochezia (per pt bright red blood in stool for about two days.)      History of Present Illness:   Francis Hu is a 49 y.o. male who presents to the ED for    Medical History:  has a past medical history of Accelerated hypertension, Arthritis, CAD (coronary artery disease), Cerebral artery occlusion with cerebral infarction ([facility] [address] CHF (congestive heart failure) ([facility] [address] COPD (chronic obstructive pulmonary disease) ([facility] [address] Depression with anxiety, Fibromyalgia, GI bleeding, Hyperlipidemia, Hypertension, Myocardial infarct ([facility] [address] Sleep apnea, and Urinary frequency. Surgical History:  has a past surgical history that includes Tonsillectomy; Colonoscopy (08/18/2017); Endoscopy, colon, diagnostic (08/18/2017); Cardiac catheterization (03/16/2017); Cardiac catheterization (04/12/2018); pr [facility] incl fluor gdnce dx w/cell washg spx (N/A, 10/22/2018); pr laryngoscopy,dirct,op scope,biopsy (N/A, 11/1/2018); and Coronary angioplasty with stent (09/13/2019). Social History:  reports that he has been smoking cigarettes. He has a 8.25 pack-year smoking history. He has never used smokeless tobacco. He reports current alcohol use of about 6.0 standard drinks of alcohol per week. He reports current drug use. Drug: Marijuana. Family History: family history includes Heart Disease in his mother; No Known Problems in his brother, brother, sister, and sister; Other in his father.     *ALLERGIES*     Pcn [penicillins]     Physical Exam:      VS:  BP (!) 215/135   Pulse 77   Temp 97 °F (36.1 °C)   Resp 16   Ht 6' (1.829 m)   Wt (!) 380 lb (172.4 kg)   SpO2 96%   BMI 51.54 kg/m²      Initial Plan of Care:  Initiate Treatment-Testing, Proceed toTreatment Area When Bed Available for ED Attending/MLP to Continue Care    -----------------END OF FIRST PROVIDER CONTACT ASSESSMENT NOTE--------------  Electronically signed by YUMIKO Live CNP   DD: 8/15/21             YUMIKO Buck CNP  08/15/21 5657

## 2021-08-16 NOTE — ED NOTES
Bed: 19  Expected date:   Expected time:   Means of arrival:   Comments:  triage     Virginia Meng RN  08/16/21 0015

## 2021-08-16 NOTE — ED PROVIDER NOTES
Department of Emergency Medicine   ED  Provider Note  Admit Date/RoomTime: 8/16/2021 12:15 AM  ED Room: 19/19          History of Present Illness:  8/16/21, Time: 12:16 AM EDT  Chief Complaint   Patient presents with    Cough     dry cough ongoing for one week.  Hematochezia     per pt bright red blood in stool for about two days. Marcelino Rodriguez is a 49 y.o. male presenting to the ED for cough, diarrhea and fatigue, beginning approximately 1 week ago. The complaint has been persistent, moderate in severity, and worsened by light exertion. Patient states that over the past week or so he has been having a cough with a sensation of shortness of breath more so with exertion. He denies any chest pain or discomfort. Patient states he has been having diarrhea and fatigue as well, states that at times he will notice bright red blood with the diarrhea. He denies any rectal pain or discomfort, no abdominal pain, denies any nausea or vomiting. Today patient felt more short of breath with his coughing and a sensation of tightening in his throat below his larynx. Patient states he has a history of a polyp removal, denies any difficulty swallowing. Review of Systems:   Pertinent positives and negatives are stated within HPI, all other systems reviewed and are negative.        --------------------------------------------- PAST HISTORY ---------------------------------------------  Past Medical History:  has a past medical history of Accelerated hypertension, Arthritis, CAD (coronary artery disease), Cerebral artery occlusion with cerebral infarction ([facility] [address] CHF (congestive heart failure) ([facility] [address] [facility] Depression with anxiety, Fibromyalgia, GI bleeding, Hyperlipidemia, Hypertension, Myocardial infarct [facility]), Sleep apnea, and Urinary frequency.     Past Surgical History:  has a past surgical history that includes Tonsillectomy; Colonoscopy (08/18/2017); Endoscopy, colon, diagnostic (08/18/2017); Cardiac catheterization (03/16/2017); Cardiac catheterization (04/12/2018); pr [facility] incl fluor gdnce dx w/cell washg spx (N/A, 10/22/2018); pr laryngoscopy,dirct,op scope,biopsy (N/A, 11/1/2018); and Coronary angioplasty with stent (09/13/2019). Social History:  reports that he has been smoking cigarettes. He has a 8.25 pack-year smoking history. He has never used smokeless tobacco. He reports current alcohol use of about 6.0 standard drinks of alcohol per week. He reports current drug use. Drug: Marijuana. Family History: family history includes Heart Disease in his mother; No Known Problems in his brother, brother, sister, and sister; Other in his father. . Unless otherwise noted, family history is non contributory    The patients home medications have been reviewed. Allergies: Pcn [penicillins]        ---------------------------------------------------PHYSICAL EXAM--------------------------------------    Constitutional/General: Alert and oriented x3  Head: Normocephalic and atraumatic  Eyes: PERRL, EOMI, sclera non icteric  Mouth: Oropharynx clear, handling secretions, no trismus, no asymmetry of the posterior oropharynx or uvular edema  Neck: Supple, full ROM, no stridor, no meningeal signs  Respiratory: Inspiratory and expiratory wheezing throughout with rhonchi. Not in respiratory distress  Cardiovascular:  Regular rate. Regular rhythm. No murmurs, no aortic murmurs, no gallops, or rubs. 2+ distal pulses. Equal extremity pulses. Chest: No chest wall tenderness  GI:  Abdomen Soft, Non tender, Non distended. No rebound, guarding, or rigidity. No pulsatile masses. Rectal exam reveals excoriation with some bleeding of the perianal region with a small fissure as well as internal hemorrhoid at the 9 o'clock position  Musculoskeletal: Moves all extremities x 4. Warm and well perfused, no clubbing, cyanosis, or edema.  Capillary refill <3 seconds  Integument: skin warm and dry. No rashes. Neurologic: GCS 15, no focal deficits, symmetric strength 5/5 in the upper and lower extremities bilaterally  Psychiatric: Normal Affect          -------------------------------------------------- RESULTS -------------------------------------------------  I have personally reviewed all laboratory and imaging results for this patient. Results are listed below.      LABS: (Lab results interpreted by me)  Results for orders placed or performed during the hospital encounter of 08/16/21   COVID-19, Rapid    Specimen: Nasopharyngeal Swab   Result Value Ref Range    SARS-CoV-2, NAAT Not Detected Not Detected   CBC Auto Differential   Result Value Ref Range    WBC 6.9 4.5 - 11.5 E9/L    RBC 4.32 3.80 - 5.80 E12/L    Hemoglobin 14.0 12.5 - 16.5 g/dL    Hematocrit 41.8 37.0 - 54.0 %    MCV 96.8 80.0 - 99.9 fL    MCH 32.4 26.0 - 35.0 pg    MCHC 33.5 32.0 - 34.5 %    RDW 13.1 11.5 - 15.0 fL    Platelets 921 610 - 521 E9/L    MPV 9.0 7.0 - 12.0 fL    Neutrophils % 61.2 43.0 - 80.0 %    Immature Granulocytes % 0.6 0.0 - 5.0 %    Lymphocytes % 27.9 20.0 - 42.0 %    Monocytes % 7.4 2.0 - 12.0 %    Eosinophils % 2.3 0.0 - 6.0 %    Basophils % 0.6 0.0 - 2.0 %    Neutrophils Absolute 4.23 1.80 - 7.30 E9/L    Immature Granulocytes # 0.04 E9/L    Lymphocytes Absolute 1.93 1.50 - 4.00 E9/L    Monocytes Absolute 0.51 0.10 - 0.95 E9/L    Eosinophils Absolute 0.16 0.05 - 0.50 E9/L    Basophils Absolute 0.04 0.00 - 0.20 E9/L   Comprehensive Metabolic Panel   Result Value Ref Range    Sodium 139 132 - 146 mmol/L    Potassium 4.1 3.5 - 5.0 mmol/L    Chloride 104 98 - 107 mmol/L    CO2 26 22 - 29 mmol/L    Anion Gap 9 7 - 16 mmol/L    Glucose 185 (H) 74 - 99 mg/dL    BUN 15 6 - 20 mg/dL    CREATININE 1.0 0.7 - 1.2 mg/dL    GFR Non-African American >60 >=60 mL/min/1.73    GFR African American >60     Calcium 9.1 8.6 - 10.2 mg/dL    Total Protein 7.1 6.4 - 8.3 g/dL    Albumin 4.0 3.5 - 5.2 g/dL    Total Bilirubin normal  ST Segments: no acute change  T Waves: no acute change    Clinical Impression: ST and T wave abnormalities seen in the lateral leads is present on previous EKGs, no findings suspicious of acute ischemia or injury  Comparison to prior EKG: stable as compared to patient's most recent EKG      ------------------------- NURSING NOTES AND VITALS REVIEWED ---------------------------   The nursing notes within the ED encounter and vital signs as below have been reviewed by myself  BP (!) 176/102   Pulse 72   Temp 97.8 °F (36.6 °C) (Oral)   Resp 16   Ht 6' (1.829 m)   Wt (!) 380 lb (172.4 kg)   SpO2 95%   BMI 51.54 kg/m²     Oxygen Saturation Interpretation: Normal    The patients available past medical records and past encounters were reviewed. ------------------------------ ED COURSE/MEDICAL DECISION MAKING----------------------  Medications   ipratropium-albuterol (DUONEB) nebulizer solution 1 ampule (1 ampule Inhalation Given 8/16/21 0044)   HYDROmorphone (DILAUDID) injection 1 mg (1 mg Intravenous Given 8/16/21 0118)   iopamidol (ISOVUE-370) 76 % injection 75 mL (75 mLs Intravenous Given 8/16/21 0213)           The cardiac monitor revealed sinus rhythm with a heart rate in the 70s as interpreted by me. The cardiac monitor was ordered secondary to the patient's chest pain and to monitor for patient for dysrhythmia. CPT [id number]           Medical Decision Making:     I, Dr. Diomedes Mast, am the primary provider of record    45-year-old male presenting with cough, shortness of breath and fatigue with diarrhea. He arrives hypertensive, no increased work of breathing or hypoxia but does have inspiratory and expiratory wheezes with rhonchi. He does have significant excoriation of his perianal region with a fissure and internal hemorrhoid, likely source of patient's bright red blood with his diarrhea. EKG shows no findings of acute ischemia or injury, unchanged from previous.   Chest x-ray appears clear with BNP of 590 which is within patient's baseline, has been higher in the past.  Initial troponin 35. Metabolic panel within acceptable limits, no leukocytosis or anemia. Covid testing is negative. Patient was given DuoNeb treatment, improvement in wheezing. Repeat troponins of 35 and 32 within the acceptable limit. CTA of the chest and soft tissue of the neck are unremarkable for abnormality. Patient is comfortable with discharge home, will be provided refill on albuterol inhaler as well as steroid and cough suppressant. PCP follow-up with instruction to return for any changes in condition or new symptoms. Re-Evaluations: This patient's ED course included: a personal history and physicial examination, re-evaluation prior to disposition, IV medications, cardiac monitoring and continuous pulse oximetry    This patient has remained hemodynamically stable, improved and been closely monitored during their ED course. Counseling: The emergency provider has spoken with the patient and discussed todays results, in addition to providing specific details for the plan of care and counseling regarding the diagnosis and prognosis. Questions are answered at this time and they are agreeable with the plan.       --------------------------------- IMPRESSION AND DISPOSITION ---------------------------------    IMPRESSION  1. Acute upper respiratory infection        DISPOSITION  Disposition: Discharge to home  Patient condition is stable        NOTE: This report was transcribed using voice recognition software.  Every effort was made to ensure accuracy; however, inadvertent computerized transcription errors may be present        Anuel Hong DO  08/16/21 0073

## 2021-08-26 ENCOUNTER — APPOINTMENT (OUTPATIENT)
Dept: GENERAL RADIOLOGY | Age: 56
End: 2021-08-26
Payer: MEDICARE

## 2021-08-26 ENCOUNTER — HOSPITAL ENCOUNTER (OUTPATIENT)
Age: 56
Setting detail: OBSERVATION
Discharge: LEFT BEFORE DISCHARGE | End: 2021-08-28
Attending: EMERGENCY MEDICINE | Admitting: INTERNAL MEDICINE
Payer: MEDICARE

## 2021-08-26 DIAGNOSIS — R10.9 ABDOMINAL PAIN, UNSPECIFIED ABDOMINAL LOCATION: Primary | ICD-10-CM

## 2021-08-26 DIAGNOSIS — R07.9 CHEST PAIN, UNSPECIFIED TYPE: ICD-10-CM

## 2021-08-26 LAB
BASOPHILS ABSOLUTE: 0.02 E9/L (ref 0–0.2)
BASOPHILS RELATIVE PERCENT: 0.3 % (ref 0–2)
EOSINOPHILS ABSOLUTE: 0.12 E9/L (ref 0.05–0.5)
EOSINOPHILS RELATIVE PERCENT: 1.6 % (ref 0–6)
HCT VFR BLD CALC: 39.6 % (ref 37–54)
HEMOGLOBIN: 13.7 G/DL (ref 12.5–16.5)
IMMATURE GRANULOCYTES #: 0.04 E9/L
IMMATURE GRANULOCYTES %: 0.5 % (ref 0–5)
LYMPHOCYTES ABSOLUTE: 1.93 E9/L (ref 1.5–4)
LYMPHOCYTES RELATIVE PERCENT: 25.1 % (ref 20–42)
MCH RBC QN AUTO: 32.2 PG (ref 26–35)
MCHC RBC AUTO-ENTMCNC: 34.6 % (ref 32–34.5)
MCV RBC AUTO: 93.2 FL (ref 80–99.9)
MONOCYTES ABSOLUTE: 0.51 E9/L (ref 0.1–0.95)
MONOCYTES RELATIVE PERCENT: 6.6 % (ref 2–12)
NEUTROPHILS ABSOLUTE: 5.06 E9/L (ref 1.8–7.3)
NEUTROPHILS RELATIVE PERCENT: 65.9 % (ref 43–80)
PDW BLD-RTO: 12.8 FL (ref 11.5–15)
PLATELET # BLD: 185 E9/L (ref 130–450)
PMV BLD AUTO: 9.4 FL (ref 7–12)
RBC # BLD: 4.25 E12/L (ref 3.8–5.8)
WBC # BLD: 7.7 E9/L (ref 4.5–11.5)

## 2021-08-26 PROCEDURE — 84484 ASSAY OF TROPONIN QUANT: CPT

## 2021-08-26 PROCEDURE — 99285 EMERGENCY DEPT VISIT HI MDM: CPT

## 2021-08-26 PROCEDURE — 80053 COMPREHEN METABOLIC PANEL: CPT

## 2021-08-26 PROCEDURE — 93005 ELECTROCARDIOGRAM TRACING: CPT | Performed by: EMERGENCY MEDICINE

## 2021-08-26 PROCEDURE — 85025 COMPLETE CBC W/AUTO DIFF WBC: CPT

## 2021-08-26 PROCEDURE — 71045 X-RAY EXAM CHEST 1 VIEW: CPT

## 2021-08-26 ASSESSMENT — PAIN DESCRIPTION - DESCRIPTORS: DESCRIPTORS: HEAVINESS

## 2021-08-26 ASSESSMENT — PAIN SCALES - GENERAL: PAINLEVEL_OUTOF10: 6

## 2021-08-26 ASSESSMENT — PAIN DESCRIPTION - LOCATION: LOCATION: CHEST

## 2021-08-26 ASSESSMENT — PAIN DESCRIPTION - ORIENTATION: ORIENTATION: MID

## 2021-08-26 ASSESSMENT — PAIN DESCRIPTION - PAIN TYPE: TYPE: ACUTE PAIN

## 2021-08-27 ENCOUNTER — APPOINTMENT (OUTPATIENT)
Dept: CT IMAGING | Age: 56
End: 2021-08-27
Payer: MEDICARE

## 2021-08-27 PROBLEM — R07.9 CHEST PAIN: Status: ACTIVE | Noted: 2021-08-27

## 2021-08-27 LAB
ADENOVIRUS BY PCR: NOT DETECTED
ALBUMIN SERPL-MCNC: 3.9 G/DL (ref 3.5–5.2)
ALP BLD-CCNC: 94 U/L (ref 40–129)
ALT SERPL-CCNC: 37 U/L (ref 0–40)
ANION GAP SERPL CALCULATED.3IONS-SCNC: 12 MMOL/L (ref 7–16)
AST SERPL-CCNC: 30 U/L (ref 0–39)
BILIRUB SERPL-MCNC: 0.2 MG/DL (ref 0–1.2)
BORDETELLA PARAPERTUSSIS BY PCR: NOT DETECTED
BORDETELLA PERTUSSIS BY PCR: NOT DETECTED
BUN BLDV-MCNC: 14 MG/DL (ref 6–20)
CALCIUM SERPL-MCNC: 8.8 MG/DL (ref 8.6–10.2)
CHLAMYDOPHILIA PNEUMONIAE BY PCR: NOT DETECTED
CHLORIDE BLD-SCNC: 101 MMOL/L (ref 98–107)
CK MB: 8.4 NG/ML (ref 0–7.7)
CO2: 24 MMOL/L (ref 22–29)
CORONAVIRUS 229E BY PCR: NOT DETECTED
CORONAVIRUS HKU1 BY PCR: NOT DETECTED
CORONAVIRUS NL63 BY PCR: NOT DETECTED
CORONAVIRUS OC43 BY PCR: NOT DETECTED
CREAT SERPL-MCNC: 1 MG/DL (ref 0.7–1.2)
EKG ATRIAL RATE: 57 BPM
EKG ATRIAL RATE: 77 BPM
EKG P AXIS: 21 DEGREES
EKG P AXIS: 27 DEGREES
EKG P-R INTERVAL: 170 MS
EKG P-R INTERVAL: 178 MS
EKG Q-T INTERVAL: 376 MS
EKG Q-T INTERVAL: 458 MS
EKG QRS DURATION: 86 MS
EKG QRS DURATION: 92 MS
EKG QTC CALCULATION (BAZETT): 425 MS
EKG QTC CALCULATION (BAZETT): 445 MS
EKG R AXIS: -18 DEGREES
EKG R AXIS: -20 DEGREES
EKG T AXIS: 142 DEGREES
EKG T AXIS: 155 DEGREES
EKG VENTRICULAR RATE: 57 BPM
EKG VENTRICULAR RATE: 77 BPM
GFR AFRICAN AMERICAN: >60
GFR NON-AFRICAN AMERICAN: >60 ML/MIN/1.73
GLUCOSE BLD-MCNC: 254 MG/DL (ref 74–99)
HUMAN METAPNEUMOVIRUS BY PCR: NOT DETECTED
HUMAN RHINOVIRUS/ENTEROVIRUS BY PCR: NOT DETECTED
INFLUENZA A BY PCR: NOT DETECTED
INFLUENZA B BY PCR: NOT DETECTED
METER GLUCOSE: 149 MG/DL (ref 74–99)
MYCOPLASMA PNEUMONIAE BY PCR: NOT DETECTED
PARAINFLUENZA VIRUS 1 BY PCR: NOT DETECTED
PARAINFLUENZA VIRUS 2 BY PCR: NOT DETECTED
PARAINFLUENZA VIRUS 3 BY PCR: NOT DETECTED
PARAINFLUENZA VIRUS 4 BY PCR: NOT DETECTED
POTASSIUM SERPL-SCNC: 3.6 MMOL/L (ref 3.5–5)
PRO-BNP: 796 PG/ML (ref 0–125)
RESPIRATORY SYNCYTIAL VIRUS BY PCR: NOT DETECTED
SARS-COV-2, PCR: NOT DETECTED
SODIUM BLD-SCNC: 137 MMOL/L (ref 132–146)
TOTAL CK: 234 U/L (ref 20–200)
TOTAL PROTEIN: 6.7 G/DL (ref 6.4–8.3)
TROPONIN, HIGH SENSITIVITY: 111 NG/L (ref 0–11)
TROPONIN, HIGH SENSITIVITY: 68 NG/L (ref 0–11)
TROPONIN, HIGH SENSITIVITY: 85 NG/L (ref 0–11)
TROPONIN, HIGH SENSITIVITY: 88 NG/L (ref 0–11)

## 2021-08-27 PROCEDURE — 94660 CPAP INITIATION&MGMT: CPT

## 2021-08-27 PROCEDURE — 2700000000 HC OXYGEN THERAPY PER DAY

## 2021-08-27 PROCEDURE — G0378 HOSPITAL OBSERVATION PER HR: HCPCS

## 2021-08-27 PROCEDURE — APPSS60 APP SPLIT SHARED TIME 46-60 MINUTES: Performed by: PHYSICIAN ASSISTANT

## 2021-08-27 PROCEDURE — 6370000000 HC RX 637 (ALT 250 FOR IP): Performed by: INTERNAL MEDICINE

## 2021-08-27 PROCEDURE — 94664 DEMO&/EVAL PT USE INHALER: CPT

## 2021-08-27 PROCEDURE — 84484 ASSAY OF TROPONIN QUANT: CPT

## 2021-08-27 PROCEDURE — 2580000003 HC RX 258: Performed by: INTERNAL MEDICINE

## 2021-08-27 PROCEDURE — 82962 GLUCOSE BLOOD TEST: CPT

## 2021-08-27 PROCEDURE — 82550 ASSAY OF CK (CPK): CPT

## 2021-08-27 PROCEDURE — 99214 OFFICE O/P EST MOD 30 MIN: CPT | Performed by: INTERNAL MEDICINE

## 2021-08-27 PROCEDURE — 99223 1ST HOSP IP/OBS HIGH 75: CPT | Performed by: INTERNAL MEDICINE

## 2021-08-27 PROCEDURE — 0202U NFCT DS 22 TRGT SARS-COV-2: CPT

## 2021-08-27 PROCEDURE — 93010 ELECTROCARDIOGRAM REPORT: CPT | Performed by: INTERNAL MEDICINE

## 2021-08-27 PROCEDURE — 94640 AIRWAY INHALATION TREATMENT: CPT

## 2021-08-27 PROCEDURE — 93005 ELECTROCARDIOGRAM TRACING: CPT | Performed by: INTERNAL MEDICINE

## 2021-08-27 PROCEDURE — 82553 CREATINE MB FRACTION: CPT

## 2021-08-27 PROCEDURE — 83880 ASSAY OF NATRIURETIC PEPTIDE: CPT

## 2021-08-27 PROCEDURE — 74176 CT ABD & PELVIS W/O CONTRAST: CPT

## 2021-08-27 PROCEDURE — 6360000002 HC RX W HCPCS: Performed by: INTERNAL MEDICINE

## 2021-08-27 PROCEDURE — 36415 COLL VENOUS BLD VENIPUNCTURE: CPT

## 2021-08-27 PROCEDURE — 96374 THER/PROPH/DIAG INJ IV PUSH: CPT

## 2021-08-27 PROCEDURE — 6370000000 HC RX 637 (ALT 250 FOR IP): Performed by: EMERGENCY MEDICINE

## 2021-08-27 RX ORDER — SPIRONOLACTONE 25 MG/1
25 TABLET ORAL DAILY
Status: DISCONTINUED | OUTPATIENT
Start: 2021-08-27 | End: 2021-08-28 | Stop reason: HOSPADM

## 2021-08-27 RX ORDER — HYDRALAZINE HYDROCHLORIDE 20 MG/ML
20 INJECTION INTRAMUSCULAR; INTRAVENOUS EVERY 4 HOURS PRN
Status: DISCONTINUED | OUTPATIENT
Start: 2021-08-27 | End: 2021-08-28 | Stop reason: HOSPADM

## 2021-08-27 RX ORDER — ARFORMOTEROL TARTRATE 15 UG/2ML
15 SOLUTION RESPIRATORY (INHALATION) 2 TIMES DAILY
Status: DISCONTINUED | OUTPATIENT
Start: 2021-08-27 | End: 2021-08-27

## 2021-08-27 RX ORDER — ACETAMINOPHEN 650 MG/1
650 SUPPOSITORY RECTAL EVERY 6 HOURS PRN
Status: DISCONTINUED | OUTPATIENT
Start: 2021-08-27 | End: 2021-08-28 | Stop reason: HOSPADM

## 2021-08-27 RX ORDER — ACETAMINOPHEN 325 MG/1
650 TABLET ORAL EVERY 6 HOURS PRN
Status: DISCONTINUED | OUTPATIENT
Start: 2021-08-27 | End: 2021-08-28 | Stop reason: HOSPADM

## 2021-08-27 RX ORDER — SODIUM CHLORIDE 0.9 % (FLUSH) 0.9 %
5-40 SYRINGE (ML) INJECTION PRN
Status: DISCONTINUED | OUTPATIENT
Start: 2021-08-27 | End: 2021-08-28 | Stop reason: HOSPADM

## 2021-08-27 RX ORDER — ALBUTEROL SULFATE 90 UG/1
2 AEROSOL, METERED RESPIRATORY (INHALATION) 4 TIMES DAILY PRN
Status: DISCONTINUED | OUTPATIENT
Start: 2021-08-27 | End: 2021-08-27 | Stop reason: CLARIF

## 2021-08-27 RX ORDER — METOPROLOL SUCCINATE 50 MG/1
50 TABLET, EXTENDED RELEASE ORAL 2 TIMES DAILY
Status: DISCONTINUED | OUTPATIENT
Start: 2021-08-27 | End: 2021-08-27

## 2021-08-27 RX ORDER — BUDESONIDE AND FORMOTEROL FUMARATE DIHYDRATE 160; 4.5 UG/1; UG/1
2 AEROSOL RESPIRATORY (INHALATION) 2 TIMES DAILY
Status: DISCONTINUED | OUTPATIENT
Start: 2021-08-27 | End: 2021-08-27 | Stop reason: CLARIF

## 2021-08-27 RX ORDER — RANITIDINE 300 MG/1
300 TABLET ORAL NIGHTLY
Status: DISCONTINUED | OUTPATIENT
Start: 2021-08-27 | End: 2021-08-27 | Stop reason: CLARIF

## 2021-08-27 RX ORDER — CLONIDINE HYDROCHLORIDE 0.1 MG/1
0.1 TABLET ORAL 3 TIMES DAILY
Status: DISCONTINUED | OUTPATIENT
Start: 2021-08-27 | End: 2021-08-27

## 2021-08-27 RX ORDER — POLYETHYLENE GLYCOL 3350 17 G/17G
17 POWDER, FOR SOLUTION ORAL DAILY PRN
Status: DISCONTINUED | OUTPATIENT
Start: 2021-08-27 | End: 2021-08-28 | Stop reason: HOSPADM

## 2021-08-27 RX ORDER — FAMOTIDINE 20 MG/1
40 TABLET, FILM COATED ORAL NIGHTLY
Status: DISCONTINUED | OUTPATIENT
Start: 2021-08-27 | End: 2021-08-28 | Stop reason: HOSPADM

## 2021-08-27 RX ORDER — ALBUTEROL SULFATE 2.5 MG/3ML
2.5 SOLUTION RESPIRATORY (INHALATION) EVERY 6 HOURS PRN
Status: DISCONTINUED | OUTPATIENT
Start: 2021-08-27 | End: 2021-08-28 | Stop reason: HOSPADM

## 2021-08-27 RX ORDER — HYDROCODONE BITARTRATE AND ACETAMINOPHEN 5; 325 MG/1; MG/1
1 TABLET ORAL EVERY 6 HOURS PRN
Status: DISCONTINUED | OUTPATIENT
Start: 2021-08-27 | End: 2021-08-28 | Stop reason: HOSPADM

## 2021-08-27 RX ORDER — FUROSEMIDE 40 MG/1
40 TABLET ORAL DAILY
Status: DISCONTINUED | OUTPATIENT
Start: 2021-08-27 | End: 2021-08-27

## 2021-08-27 RX ORDER — TRAZODONE HYDROCHLORIDE 50 MG/1
100 TABLET ORAL NIGHTLY
Status: DISCONTINUED | OUTPATIENT
Start: 2021-08-27 | End: 2021-08-28 | Stop reason: HOSPADM

## 2021-08-27 RX ORDER — ONDANSETRON 4 MG/1
4 TABLET, ORALLY DISINTEGRATING ORAL EVERY 8 HOURS PRN
Status: DISCONTINUED | OUTPATIENT
Start: 2021-08-27 | End: 2021-08-28 | Stop reason: HOSPADM

## 2021-08-27 RX ORDER — SODIUM CHLORIDE 9 MG/ML
25 INJECTION, SOLUTION INTRAVENOUS PRN
Status: DISCONTINUED | OUTPATIENT
Start: 2021-08-27 | End: 2021-08-28 | Stop reason: HOSPADM

## 2021-08-27 RX ORDER — ATORVASTATIN CALCIUM 40 MG/1
40 TABLET, FILM COATED ORAL DAILY
Status: DISCONTINUED | OUTPATIENT
Start: 2021-08-27 | End: 2021-08-28 | Stop reason: HOSPADM

## 2021-08-27 RX ORDER — SODIUM CHLORIDE 0.9 % (FLUSH) 0.9 %
5-40 SYRINGE (ML) INJECTION EVERY 12 HOURS SCHEDULED
Status: DISCONTINUED | OUTPATIENT
Start: 2021-08-27 | End: 2021-08-28 | Stop reason: HOSPADM

## 2021-08-27 RX ORDER — AMLODIPINE BESYLATE 5 MG/1
10 TABLET ORAL DAILY
Status: DISCONTINUED | OUTPATIENT
Start: 2021-08-27 | End: 2021-08-28 | Stop reason: HOSPADM

## 2021-08-27 RX ORDER — CLONIDINE HYDROCHLORIDE 0.1 MG/1
0.1 TABLET ORAL ONCE
Status: COMPLETED | OUTPATIENT
Start: 2021-08-27 | End: 2021-08-27

## 2021-08-27 RX ORDER — BUMETANIDE 1 MG/1
2 TABLET ORAL DAILY
Status: DISCONTINUED | OUTPATIENT
Start: 2021-08-27 | End: 2021-08-28 | Stop reason: HOSPADM

## 2021-08-27 RX ORDER — ONDANSETRON 2 MG/ML
4 INJECTION INTRAMUSCULAR; INTRAVENOUS EVERY 6 HOURS PRN
Status: DISCONTINUED | OUTPATIENT
Start: 2021-08-27 | End: 2021-08-28 | Stop reason: HOSPADM

## 2021-08-27 RX ORDER — METOPROLOL SUCCINATE 100 MG/1
100 TABLET, EXTENDED RELEASE ORAL 2 TIMES DAILY
Status: DISCONTINUED | OUTPATIENT
Start: 2021-08-27 | End: 2021-08-28 | Stop reason: HOSPADM

## 2021-08-27 RX ORDER — BUDESONIDE 0.5 MG/2ML
0.5 INHALANT ORAL 2 TIMES DAILY
Status: DISCONTINUED | OUTPATIENT
Start: 2021-08-27 | End: 2021-08-28 | Stop reason: HOSPADM

## 2021-08-27 RX ORDER — IPRATROPIUM BROMIDE AND ALBUTEROL SULFATE 2.5; .5 MG/3ML; MG/3ML
1 SOLUTION RESPIRATORY (INHALATION) 4 TIMES DAILY
Status: DISCONTINUED | OUTPATIENT
Start: 2021-08-27 | End: 2021-08-28 | Stop reason: HOSPADM

## 2021-08-27 RX ORDER — HYDRALAZINE HYDROCHLORIDE 25 MG/1
25 TABLET, FILM COATED ORAL EVERY 8 HOURS SCHEDULED
Status: DISCONTINUED | OUTPATIENT
Start: 2021-08-27 | End: 2021-08-28 | Stop reason: HOSPADM

## 2021-08-27 RX ORDER — LOSARTAN POTASSIUM 50 MG/1
100 TABLET ORAL NIGHTLY
Status: DISCONTINUED | OUTPATIENT
Start: 2021-08-27 | End: 2021-08-28 | Stop reason: HOSPADM

## 2021-08-27 RX ADMIN — TRAZODONE HYDROCHLORIDE 100 MG: 50 TABLET ORAL at 21:20

## 2021-08-27 RX ADMIN — METOPROLOL SUCCINATE 100 MG: 100 TABLET, EXTENDED RELEASE ORAL at 21:21

## 2021-08-27 RX ADMIN — SPIRONOLACTONE 25 MG: 25 TABLET ORAL at 18:05

## 2021-08-27 RX ADMIN — HYDRALAZINE HYDROCHLORIDE 25 MG: 25 TABLET, FILM COATED ORAL at 21:20

## 2021-08-27 RX ADMIN — HYDRALAZINE HYDROCHLORIDE 20 MG: 20 INJECTION INTRAMUSCULAR; INTRAVENOUS at 18:04

## 2021-08-27 RX ADMIN — LOSARTAN POTASSIUM 100 MG: 50 TABLET, FILM COATED ORAL at 21:20

## 2021-08-27 RX ADMIN — SODIUM CHLORIDE, PRESERVATIVE FREE 10 ML: 5 INJECTION INTRAVENOUS at 09:07

## 2021-08-27 RX ADMIN — HYDROCODONE BITARTRATE AND ACETAMINOPHEN 1 TABLET: 5; 325 TABLET ORAL at 06:05

## 2021-08-27 RX ADMIN — SODIUM CHLORIDE, PRESERVATIVE FREE 10 ML: 5 INJECTION INTRAVENOUS at 21:20

## 2021-08-27 RX ADMIN — CLONIDINE HYDROCHLORIDE 0.1 MG: 0.1 TABLET ORAL at 14:44

## 2021-08-27 RX ADMIN — FAMOTIDINE 40 MG: 20 TABLET, FILM COATED ORAL at 21:20

## 2021-08-27 RX ADMIN — BUDESONIDE 500 MCG: 0.5 SUSPENSION RESPIRATORY (INHALATION) at 09:25

## 2021-08-27 RX ADMIN — NITROGLYCERIN 0.5 INCH: 20 OINTMENT TOPICAL at 00:21

## 2021-08-27 RX ADMIN — BUMETANIDE 2 MG: 1 TABLET ORAL at 12:06

## 2021-08-27 RX ADMIN — ARFORMOTEROL TARTRATE 15 MCG: 15 SOLUTION RESPIRATORY (INHALATION) at 09:24

## 2021-08-27 RX ADMIN — ATORVASTATIN CALCIUM 40 MG: 40 TABLET, FILM COATED ORAL at 21:21

## 2021-08-27 RX ADMIN — METOPROLOL SUCCINATE 50 MG: 50 TABLET, EXTENDED RELEASE ORAL at 09:07

## 2021-08-27 RX ADMIN — BUDESONIDE 500 MCG: 0.5 SUSPENSION RESPIRATORY (INHALATION) at 20:17

## 2021-08-27 RX ADMIN — AMLODIPINE BESYLATE 10 MG: 5 TABLET ORAL at 09:04

## 2021-08-27 RX ADMIN — IPRATROPIUM BROMIDE AND ALBUTEROL SULFATE 1 AMPULE: .5; 3 SOLUTION RESPIRATORY (INHALATION) at 20:17

## 2021-08-27 ASSESSMENT — PAIN SCALES - GENERAL
PAINLEVEL_OUTOF10: 0
PAINLEVEL_OUTOF10: 0
PAINLEVEL_OUTOF10: 9
PAINLEVEL_OUTOF10: 7
PAINLEVEL_OUTOF10: 0

## 2021-08-27 ASSESSMENT — PAIN DESCRIPTION - PAIN TYPE: TYPE: CHRONIC PAIN

## 2021-08-27 ASSESSMENT — PAIN DESCRIPTION - LOCATION: LOCATION: GENERALIZED

## 2021-08-27 NOTE — CONSULTS
The above documentation has been prepared under my direction and personally reviewed by me in its entirety. I confirm that the note above accurately reflects all work, treatment, procedures, and medical decision making performed by me. The patient's history was independently obtained. The patient was independently examined. Electrocardiogram, prior and present cardiovascular assessment, and laboratory studies were reviewed. The patient is a 19-year-old white male known to [facility] with primary cardiovascular care provided by Heidi Moore. He has a known history of coronary atherosclerosis with coronary intervention of his right coronary distribution in September, 2019 with an associated 50% stenosis of the proximal left anterior descending. He has subsequently undergone no additional ischemic assessment, albeit a previous recommended myocardial perfusion imaging study with most recent objective assessment that of an echocardiogram in January, 2020 demonstrating evidence of a normal-sized left ventricular chamber with severe concentric left ventricular hypertrophy and mild left ventricular systolic dysfunction with an estimated ejection fraction of 45% with stage III diastolic dysfunction and no significant valvular pathology. He additionally has a history of chronic diastolic heart failure in the face of hypertension, hyperlipidemia, chronic obstructive lung disease with ongoing tobacco consumption, morbid obesity and obstructive sleep apnea with compliance with the use of nocturnal CPAP albeit with a 30 pound weight gain since his recollection of most recent assessment. He remained compensated time of his most recent outpatient assessment approximately 9 months earlier with lack of follow-up in part on the basis of financial constraints.  He remained in his usual state of health until the past 10 days when he began to note the onset of a nonproductive cough associated with an increase of his chronic exertional dyspnea was evaluated in the emergency room with assessment inclusive of a chest x-ray suggesting chronic interstitial changes and a contrast CT angiogram demonstrated no evidence of significant abnormalities. At that time, he reported no significant chest discomfort or other ischemic equivalents with mildly elevated high-sensitivity troponin levels in a pattern not indicative of an acute coronary syndrome and a stable proBNP level less than that of previous assessment. He was eventually prescribed bronchodilator therapy but his noted minimal changes of his respiratory status. The exception of related to bronchodilator treatments, he denies any interim chest discomfort but relates on sporadic occasions including on the day of his assessment the development of precordial tightness during respiratory treatments radiating into his left axilla and upper extremity and on the day of hospitalization associated with diaphoresis. On his basis, he again presented to the emergency room where a resting electrocardiogram demonstrated evidence of sinus rhythm with evidence of left ventricular hypertrophy and nonspecific ST changes upon review and not significantly altered from that of his previous tracing chest x-ray again reviewed demonstrating no evidence of cardiomegaly with chronic interstitial changes. Initial elevated high-sensitivity troponin level was noted with a significant increase on follow-up and with elevation of total CPK levels and a normal myocardial index and pending proBNP level. He has been afebrile throughout hospitalization without evidence of a leukocytosis and a viral respiratory panel has excluded the presence of a viral infection including the exclusion of a COVID-19 infection. The exception of related to an additional respiratory treatment, he is experienced no further chest discomfort.  He relates no additional manifestations of decompensated left ventricular systolic dysfunction with chronic minimal lower extremity edema noted. At the time of evaluation, his medications and allergies were reviewed as well as that of his past medical history and review of systems as documented. On examination, following completion of his respiratory treatment, he denies further chest discomfort and is able to complete sentences without difficulty with adequate oxygen saturations on room air. Is hemodynamically stable vital signs as documented in evidence of persistent hypertension. His present weight is in excess of 10 pounds heavier than time of his most recent outpatient cardiovascular evaluation. Jugular venous pressure appears grossly normal although somewhat difficult to assess with no carotid bruits. Lung fields during his respiratory treatment demonstrate somewhat distant breath sounds but are otherwise clear to auscultation with no evidence of bronchospasm at the time of his assessment. Cardiac examination still for a regular rate and rhythm with no gallop rhythm or cardiac murmur. A benign abdominal examination is present with the exception of obesity and minimal pretibial edema present. Diagnostic Assessment and Plan: On a clinical basis, the patient presents with symptoms of chest discomfort compatible with that of myocardial ischemia the face of additional symptomatology consistent with that of exacerbation of chronic obstructive lung disease as well as that of suboptimal blood pressure control with increasing myocardial oxygen demands. Exclusively on the basis of his troponin levels, a potential non-ST elevation myocardial infarction cannot be excluded. On immediate basis, stabilization of his respiratory status appears indicated with assessment of a repeat electrocardiogram and cardiac biomarkers inclusive of a proBNP level.  On the basis of volume overload, adjustment of his diuretics and conversion from that of furosemide to bumetanide would be advisable with careful monitoring of his volume status as well as that of renal function and electrolytes. Delay of ischemic assessment least of the next 24 hours would be advisable and based on his clinical course a decision regarding that of a functional assessment with a gated vasodilator myocardial perfusion imaging study or that of coronary angiography especially if recurrent symptomatology were to occur. Time of evaluation, an extensive discussion regarding his needs of appropriate lifestyle modification both related to smoking cessation and weight reduction to benefit diastolic cardiac performance and the management of his obstructive sleep apnea have been reinforced with needs of a repeat formal sleep assessment based on his weight gain to assure adequate settings of his nocturnal CPAP have been maintained and not further exacerbating his diastolic heart failure. Ongoing aggressive risk factor modification of blood pressure and serum lipids will remain essential to reducing risk of future atherosclerotic development. Thank you for allowing me to participate in your patient's care. Please feel free to contact me if you have any questions or concerns. Concepcion Rebolledo.  Paz Santana, [id number] [facility]

## 2021-08-27 NOTE — CARE COORDINATION
8/27/2021: Transition of care:  Patient is an Observation patient who came into the ER with CP after his breathing treatment. Patient resides in an apartment alone with his dog. He and his wife live in separate residents. There are 4 steps to get into the apartment. Patient has a cane and has a CPAP and Nebulizer with [facility].  He is a Diabetic who is control with oral medications. Patient declines and DME/HHC needs at this time. Patient's wife or brother can transport him home. Patient is for a Pulmonary and Cardiology consult. Will continue to follow.  Electronically signed by Lucy Lomas RN/ on 8/27/2021 at 10:49 AM.

## 2021-08-27 NOTE — ED PROVIDER NOTES
HPI:  8/26/21, Time: 11:12 PM EDT         Elizabeth Almanzar is a 49 y.o. male presenting to the ED for treatment of chest pain going down his left arm, beginning short time ago. The complaint has been persistent, moderate in severity, and worsened by nothing. She reports he was getting a breathing treatment started having pains in his chest going down his left arm. Patient reporting tingling in his arm. Patient does report history of coronary disease. He reports some abdominal pain. he does report feeling short of breath and diaphoretic. Patient reporting he did take nitro as well as aspirin with improvement of symptoms. Patient reporting no new leg pain. Patient reporting no productive cough. ROS:   Pertinent positives and negatives are stated within HPI, all other systems reviewed and are negative.  --------------------------------------------- PAST HISTORY ---------------------------------------------  Past Medical History:  has a past medical history of Accelerated hypertension, Arthritis, CAD (coronary artery disease), Cerebral artery occlusion with cerebral infarction ([facility] [address] CHF (congestive heart failure) ([facility] [address] [facility] Depression with anxiety, Fibromyalgia, GI bleeding, Hyperlipidemia, Hypertension, Myocardial infarct [facility]), Sleep apnea, and Urinary frequency. Past Surgical History:  has a past surgical history that includes Tonsillectomy; Colonoscopy (08/18/2017); Endoscopy, colon, diagnostic (08/18/2017); Cardiac catheterization (03/16/2017); Cardiac catheterization (04/12/2018); pr [facility] incl fluor gdnce dx w/cell washg spx (N/A, 10/22/2018); pr laryngoscopy,dirct,op scope,biopsy (N/A, 11/1/2018); and Coronary angioplasty with stent (09/13/2019). Social History:  reports that he has been smoking cigarettes. He has a 16.50 pack-year smoking history.  He has never used smokeless tobacco. He reports current alcohol use of about 6.0 standard drinks of alcohol per week. He reports current drug use. Drug: Marijuana. Family History: family history includes Heart Disease in his mother; No Known Problems in his brother, brother, sister, and sister; Other in his father. The patients home medications have been reviewed. Allergies: Pcn [penicillins]    ---------------------------------------------------PHYSICAL EXAM--------------------------------------    Constitutional/General: Alert and oriented x3, mild distress  Head: Normocephalic and atraumatic  Eyes: PERRL, EOMI  Mouth: Oropharynx clear, handling secretions, no trismus  Neck: Supple, full ROM, non tender to palpation in the midline, no stridor, no crepitus, no meningeal signs  Pulmonary: Lungs clear to auscultation bilaterally, no wheezes, rales, or rhonchi. Not in respiratory distress  Cardiovascular:  Regular rate. Regular rhythm. No murmurs, gallops, or rubs. 2+ distal pulses  Chest: no chest wall tenderness  Abdomen: Soft. Abdomen is distended tender mid abdomen Non distended. +BS. No rebound, guarding, or rigidity. No pulsatile masses appreciated. Musculoskeletal: Moves all extremities x 4. Warm and well perfused, no clubbing, cyanosis, edema bilaterally. Capillary refill <3 seconds  Skin: warm and dry. No rashes. Neurologic: GCS 15, CN 2-12 grossly intact, no focal deficits, symmetric strength 5/5 in the upper and lower extremities bilaterally  Psych: Normal Affect    -------------------------------------------------- RESULTS -------------------------------------------------  I have personally reviewed all laboratory and imaging results for this patient. Results are listed below.      LABS:  Results for orders placed or performed during the hospital encounter of 08/26/21   CBC auto differential   Result Value Ref Range    WBC 7.7 4.5 - 11.5 E9/L    RBC 4.25 3.80 - 5.80 E12/L    Hemoglobin 13.7 12.5 - 16.5 g/dL    Hematocrit 39.6 37.0 - 54.0 %    MCV 93.2 80.0 - 99.9 fL    MCH 32.2 26.0 - 35.0 pg    MCHC 34.6 (H) 32.0 - 34.5 %    RDW 12.8 11.5 - 15.0 fL    Platelets 219 418 - 543 E9/L    MPV 9.4 7.0 - 12.0 fL    Neutrophils % 65.9 43.0 - 80.0 %    Immature Granulocytes % 0.5 0.0 - 5.0 %    Lymphocytes % 25.1 20.0 - 42.0 %    Monocytes % 6.6 2.0 - 12.0 %    Eosinophils % 1.6 0.0 - 6.0 %    Basophils % 0.3 0.0 - 2.0 %    Neutrophils Absolute 5.06 1.80 - 7.30 E9/L    Immature Granulocytes # 0.04 E9/L    Lymphocytes Absolute 1.93 1.50 - 4.00 E9/L    Monocytes Absolute 0.51 0.10 - 0.95 E9/L    Eosinophils Absolute 0.12 0.05 - 0.50 E9/L    Basophils Absolute 0.02 0.00 - 0.20 E9/L   Comprehensive Metabolic Panel   Result Value Ref Range    Sodium 137 132 - 146 mmol/L    Potassium 3.6 3.5 - 5.0 mmol/L    Chloride 101 98 - 107 mmol/L    CO2 24 22 - 29 mmol/L    Anion Gap 12 7 - 16 mmol/L    Glucose 254 (H) 74 - 99 mg/dL    BUN 14 6 - 20 mg/dL    CREATININE 1.0 0.7 - 1.2 mg/dL    GFR Non-African American >60 >=60 mL/min/1.73    GFR African American >60     Calcium 8.8 8.6 - 10.2 mg/dL    Total Protein 6.7 6.4 - 8.3 g/dL    Albumin 3.9 3.5 - 5.2 g/dL    Total Bilirubin 0.2 0.0 - 1.2 mg/dL    Alkaline Phosphatase 94 40 - 129 U/L    ALT 37 0 - 40 U/L    AST 30 0 - 39 U/L   Troponin   Result Value Ref Range    Troponin, High Sensitivity 68 (H) 0 - 11 ng/L   EKG 12 Lead   Result Value Ref Range    Ventricular Rate 77 BPM    Atrial Rate 77 BPM    P-R Interval 178 ms    QRS Duration 86 ms    Q-T Interval 376 ms    QTc Calculation (Bazett) 425 ms    P Axis 27 degrees    R Axis -20 degrees    T Axis 142 degrees       RADIOLOGY:  Interpreted by Radiologist.  CT ABDOMEN PELVIS WO CONTRAST Additional Contrast? None   Final Result   Marked diffuse hepatic steatosis. Distal colonic diverticula. No acute diverticulitis. Normal appendix. Enlarged prostate. Correlation with rectal examination and serum PSA levels   recommended. Stable mild fusiform aneurysmal dilatation of the infrarenal abdominal aorta. XR CHEST PORTABLE   Final Result   No acute process. EKG:  This EKG is signed and interpreted by me. Rate: 77  Rhythm: Sinus  Interpretation: Inverted T waves in 1 and aVL as well as anterior lateral  Comparison: There are new changes compared to EKG that was done on August 15 there are new T wave inversions mild depression laterally leads V4 through V6 as well as V3      ------------------------- NURSING NOTES AND VITALS REVIEWED ---------------------------   The nursing notes within the ED encounter and vital signs as below have been reviewed by myself. BP (!) 156/100   Pulse 60   Temp 96.9 °F (36.1 °C)   Resp 20   Ht 6' (1.829 m)   Wt (!) 382 lb (173.3 kg)   SpO2 96%   BMI 51.81 kg/m²   Oxygen Saturation Interpretation: Normal    The patients available past medical records and past encounters were reviewed. ------------------------------ ED COURSE/MEDICAL DECISION MAKING----------------------  Medications   nitroglycerin (NITRO-BID) 2 % ointment 0.5 inch (0.5 inches Topical Given 8/27/21 0021)             Medical Decision Making:    Presenting here because of pain in his chest going into his left arm. Patient reports symptoms started after breathing treatment. Patient medicated and improving. Patient labs are reviewed EKG does show new findings as far as T wave inversion anterior laterally. Patient troponin elevated. Patient reassessed and reexamined having no acute pains right now. Patient reporting having abdominal pain. He states been ongoing. CT on pelvis was ordered and pending. Re-Evaluations:             Re-evaluation. Patients symptoms show no change    Reevaluated improving having no active pains in his chest.  Patient does report abdominal pain. He reports is been going on for weeks. Patient made aware of findings and plan a CT of the abdomen pelvis was ordered for the patient.   Consultations:         I did speak to  patient will be admitted Critical Care: This patient's ED course included: a personal history and physicial eaxmination    This patient has been closely monitored during their ED course. Counseling: The emergency provider has spoken with the patient and discussed todays results, in addition to providing specific details for the plan of care and counseling regarding the diagnosis and prognosis. Questions are answered at this time and they are agreeable with the plan.       --------------------------------- IMPRESSION AND DISPOSITION ---------------------------------    IMPRESSION  1. Abdominal pain, unspecified abdominal location    2. Chest pain, unspecified type        DISPOSITION  Disposition: Admit to telemetry  Patient condition is stable        NOTE: This report was transcribed using voice recognition software.  Every effort was made to ensure accuracy; however, inadvertent computerized transcription errors may be present          Oscar Solomon MD  08/27/21 0132       Oscar Solomon MD  08/27/21 [id number]       Oscar Solomon MD  08/27/21 [id number]

## 2021-08-27 NOTE — CONSULTS
Inpatient Cardiology Consultation      Reason for Consult:  ACS    Consulting Physician: Dr Narciso Yi     Requesting Physician:  Dr Yohan Neville     Date of Consultation: 8/27/2021    HISTORY OF PRESENT ILLNESS:   Mr Joanna Zavala is a 48 yo male who follows with Dr Narciso Yi, last seen in the office with Dr Dian Sebastian 1/11/2021. Past medical history includes coronary artery disease s/p NSTEMI PCI-RCA 9/13/2019, ischemic cardiomyopathy, NSVT, hypertension, hyperlipidemia, morbid obesity, obstructive sleep apnea, COPD/tobacco abuse, TIA, arthritis/fibromyalgia, depression. Office visit 1/11/2021: Clonidine stopped - restart Losartan 100mg and Amlodipine 10mg     ED encounter 8/16/2021 with shortness of breath. CTA negative. Troponin 35--> 32. Diagnosed with upper respiratory infection and discharged home. He said he saw his primary care provider and told him to take his nebulizer 4 times daily following this. Presented to [facility]/26/2021 with chest pain   VS: [id number]%RA-187/113   Labs: WBC 7.7, H&H 13.7/39.6, Plt 185. K+ 3.6, BUN/SCr 14/1.0, glucose 254. Troponin 68  --> 111   CT ab/pelvis: Marked diffuse hepatic steatosis.   Distal colonic diverticula. No acute diverticulitis.    Normal appendix.   Enlarged  prostate. Correlation with rectal examination and serum PSA levels recommended.    Stable mild fusiform aneurysmal dilatation of the infrarenal abdominal aorta. CXR: no acute process     He was given Nitropatch and admitted to a telemetry monitoring floor. Cardiology was asked to see the patient for chest pain. He states that he just finished his nebulizer treatment yesterday evening when he developed chest tightness and left arm pain. It was associated with diaphoretic sensation and hot flash feelings. Symptoms lasted about 10 to 15 minutes and then resolved. He did not take any medications.   He called the ambulance and was brought to the hospital.  He admits to a cough for the last couple of weeks, branch, an SA vinay or AV vinay branch, two small right ventricular marginal branches, a PDA and a PLV branch.  There was  20% diffuse proximal RCA stenosis.  There was 40% to 50% tubular mid stenosis.  There was a hazy distal ulcerated plaque with filling defect probably due to clot noted. 4.5 x 30 Resolute [place] stent was deployed at 14 atmospheres at the distal RCA.  Then, a 5.0 x 15 Resolute [place] stent was deployed proximal to the first stent at the  mid RCA and was deployed at 14 atmospheres.   IMPRESSION: 1.   Severe unstable distal RCA plaque with probable clot, status post two drug eluting stents. 2.  Moderate proximal LAD stenosis. 3.  Significantly  elevated left ventricular end diastolic pressure. 4.  Mild left ventricular systolic dysfunction. 4. TTE 1/28/2020 Alane Fabry):  Ejection fraction is visually estimated at 45%. Severe left ventricular concentric hypertrophy noted. There is doppler  evidence of stage III diastolic dysfunction. Physiologic and/or trace mitral regurgitation is present. 5. Hypertension  6. Hyperlipidemia  7. Morbid obesity (BMI 51.81)  8. Obstructive sleep apnea compliant with CPAP  9. COPD/ongoing tobacco abuse  10. Arthritis/fibromyalgia  11. Depression        Medications Prior to admit:  Prior to Admission medications    Medication Sig Start Date End Date Taking?  Authorizing Provider   albuterol sulfate HFA (VENTOLIN HFA) 108 (90 Base) MCG/ACT inhaler Inhale 2 puffs into the lungs 4 times daily as needed for Wheezing 8/16/21  Yes Enrigue Sample, DO   QVAR REDIHALER 80 MCG/ACT AERB inhaler INHALE ONE PUFF BY MOUTH EVERY 12 HOURS WITH SPACER 1/6/21  Yes Historical Provider, MD   furosemide (LASIX) 40 MG tablet TAKE ONE TABLET BY MOUTH EVERY DAY 1/4/21  Yes Historical Provider, MD   traZODone (DESYREL) 50 MG tablet TAKE TWO TABLETS BY MOUTH EVERY DAY 12/30/20  Yes Historical Provider, MD   amLODIPine (NORVASC) 10 MG tablet Take 1 tablet by mouth daily 1/11/21  Yes Leandra Collet Rose Nguyen MD   albuterol (PROVENTIL) (2.5 MG/3ML) 0.083% nebulizer solution Take 3 mLs by nebulization every 6 hours as needed for Wheezing 1/14/20  Yes YUMIKO Hemphill CNP   metoprolol succinate (TOPROL XL) 50 MG extended release tablet Take 1 tablet by mouth 2 times daily 1/14/20  Yes YUMIKO Hemphill CNP   aspirin 81 MG tablet Take 81 mg by mouth daily    Yes Historical Provider, MD   cloNIDine (CATAPRES) 0.1 MG tablet Take 1 tablet by mouth three times daily 1/31/20   Martín Singh MD   budesonide-formoterol (SYMBICORT) 160-4.5 MCG/ACT AERO Inhale 2 puffs into the lungs 2 times daily  Patient not taking: Reported on 1/11/2021 1/14/20   YUMIKO Hemphill CNP   atorvastatin (LIPITOR) 40 MG tablet Take 1 tablet by mouth daily  Patient not taking: Reported on 1/11/2021 1/14/20   YUMIKO Hemphill CNP   ticagrelor (BRILINTA) 90 MG TABS tablet Take 1 tablet by mouth 2 times daily  Patient not taking: Reported on 1/11/2021 9/14/19   Starla Armstrong DO   ranitidine (ZANTAC) 300 MG tablet Take 1 tablet by mouth nightly  Patient not taking: Reported on 1/11/2021 9/14/19   Starla Armstrong DO   losartan (COZAAR) 100 MG tablet Take 1 tablet by mouth nightly  Patient not taking: Reported on 1/11/2021 7/10/18   Martín Singh MD       Current Medications:    Current Facility-Administered Medications: albuterol (PROVENTIL) nebulizer solution 2.5 mg, 2.5 mg, Nebulization, Q6H PRN  amLODIPine (NORVASC) tablet 10 mg, 10 mg, Oral, Daily  atorvastatin (LIPITOR) tablet 40 mg, 40 mg, Oral, Daily  budesonide-formoterol (SYMBICORT) 160-4.5 MCG/ACT inhaler 2 puff, 2 puff, Inhalation, BID  cloNIDine (CATAPRES) tablet 0.1 mg, 0.1 mg, Oral, TID  furosemide (LASIX) tablet 40 mg, 1 tablet, Oral, Daily  losartan (COZAAR) tablet 100 mg, 100 mg, Oral, Nightly  metoprolol succinate (TOPROL XL) extended release tablet 50 mg, 50 mg, Oral, BID  beclomethasone HFA (QVAR REDIHALER) inhaler 80 mcg/puff, 1 puff, [place]  · Gastrointestinal: Denies heartburn, nausea/vomiting, diarrhea and constipation, black/bloody, and tarry stools. · Genitourinary: Denies dysuria and hematuria  · Hematologic: Denies excessive bruising or bleeding  · Lymphatic: Denies lumps and bumps to neck, axilla, breast, and groin      PHYSICAL EXAM:   BP (!) 179/111   Pulse 59   Temp 97.8 °F (36.6 °C) (Temporal)   Resp 18   Ht 6' (1.829 m)   Wt (!) 382 lb (173.3 kg)   SpO2 95%   BMI 51.81 kg/m²   CONST:  Well developed, morbidly obese  male who appears his stated age. Awake, alert, cooperative, no apparent distress  HEENT:   Head- Normocephalic, atraumatic   Eyes- Conjunctivae pink, anicteric  Throat- Oral mucosa pink and moist  Neck-  No stridor, trachea midline, no jugular venous distention. CHEST: Chest symmetrical and non-tender to palpation. RESPIRATORY: Coarse breath sounds bilaterally, tight with auditory wheeze. CARDIOVASCULAR:     No carotid bruit noted bilaterally   Heart Ausculation- Regular rate and rhythm, no murmur. PV: No lower extremity edema. No varicosities. ABDOMEN: Soft, non-tender to light palpation. Bowel sounds present. MS: Good muscle strength and tone. No atrophy or abnormal movements. : Deferred   SKIN: Warm and dry no statis dermatitis or ulcers   NEURO / PSYCH: Oriented to person, place and time. Speech clear and appropriate. Follows all commands. Pleasant affect     DATA:    ECG: Sinus rhythm with nonspecific ST-T wave changes.   Heart rate 77  Tele strips: Sinus rhythm heart rate in the 50s  Diagnostic:    Labs:   CBC:   Recent Labs     08/26/21  2337   WBC 7.7   HGB 13.7   HCT 39.6        BMP:   Recent Labs     08/26/21  2337      K 3.6   CO2 24   BUN 14   CREATININE 1.0   LABGLOM >60   CALCIUM 8.8     HgA1c:   Lab Results   Component Value Date    LABA1C 6.7 01/17/2019       FASTING LIPID PANEL:  Lab Results   Component Value Date    CHOL 205 08/04/2020    HDL 33 08/04/2020 [address] 127 08/04/2020    TRIG 224 08/04/2020     LIVER PROFILE:  Recent Labs     08/26/21  2337   AST 30   ALT 37   LABALBU 3.9       Assessment and Plan per Dr Darren Mei   Electronically signed by Randell Agarwal, [id number] on 8/27/2021 at 7:50 AM   The above documentation has been prepared under my direction and personally reviewed by me in its entirety. I confirm that the note above accurately reflects all work, treatment, procedures, and medical decision making performed by me.     The patient's history was independently obtained. The patient was independently examined. Electrocardiogram, prior and present cardiovascular assessment, and laboratory studies were reviewed.     The patient is a 31-year-old white male known to [facility] with primary cardiovascular care provided by Cornelia Olea. He has a known history of coronary atherosclerosis with coronary intervention of his right coronary distribution in September, 2019 with an associated 50% stenosis of the proximal left anterior descending. He has subsequently undergone no additional ischemic assessment, albeit a previous recommended myocardial perfusion imaging study with most recent objective assessment that of an echocardiogram in January, 2020 demonstrating evidence of a normal-sized left ventricular chamber with severe concentric left ventricular hypertrophy and mild left ventricular systolic dysfunction with an estimated ejection fraction of 45% with stage III diastolic dysfunction and no significant valvular pathology. He additionally has a history of chronic diastolic heart failure in the face of hypertension, hyperlipidemia, chronic obstructive lung disease with ongoing tobacco consumption, morbid obesity and obstructive sleep apnea with compliance with the use of nocturnal CPAP albeit with a 30 pound weight gain since his recollection of most recent assessment.  He remained compensated time of his most recent outpatient assessment approximately 9 months earlier with lack of follow-up in part on the basis of financial constraints. He remained in his usual state of health until the past 10 days when he began to note the onset of a nonproductive cough associated with an increase of his chronic exertional dyspnea was evaluated in the emergency room with assessment inclusive of a chest x-ray suggesting chronic interstitial changes and a contrast CT angiogram demonstrated no evidence of significant abnormalities. At that time, he reported no significant chest discomfort or other ischemic equivalents with mildly elevated high-sensitivity troponin levels in a pattern not indicative of an acute coronary syndrome and a stable proBNP level less than that of previous assessment. He was eventually prescribed bronchodilator therapy but his noted minimal changes of his respiratory status. The exception of related to bronchodilator treatments, he denies any interim chest discomfort but relates on sporadic occasions including on the day of his assessment the development of precordial tightness during respiratory treatments radiating into his left axilla and upper extremity and on the day of hospitalization associated with diaphoresis. On his basis, he again presented to the emergency room where a resting electrocardiogram demonstrated evidence of sinus rhythm with evidence of left ventricular hypertrophy and nonspecific ST changes upon review and not significantly altered from that of his previous tracing chest x-ray again reviewed demonstrating no evidence of cardiomegaly with chronic interstitial changes. Initial elevated high-sensitivity troponin level was noted with a significant increase on follow-up and with elevation of total CPK levels and a normal myocardial index and pending proBNP level.  He has been afebrile throughout hospitalization without evidence of a leukocytosis and a viral respiratory panel has excluded the presence of a viral infection including the exclusion of a COVID-19 infection. The exception of related to an additional respiratory treatment, he is experienced no further chest discomfort. He relates no additional manifestations of decompensated left ventricular systolic dysfunction with chronic minimal lower extremity edema noted.     At the time of evaluation, his medications and allergies were reviewed as well as that of his past medical history and review of systems as documented.     On examination, following completion of his respiratory treatment, he denies further chest discomfort and is able to complete sentences without difficulty with adequate oxygen saturations on room air. Is hemodynamically stable vital signs as documented in evidence of persistent hypertension. His present weight is in excess of 10 pounds heavier than time of his most recent outpatient cardiovascular evaluation. Jugular venous pressure appears grossly normal although somewhat difficult to assess with no carotid bruits. Lung fields during his respiratory treatment demonstrate somewhat distant breath sounds but are otherwise clear to auscultation with no evidence of bronchospasm at the time of his assessment. Cardiac examination still for a regular rate and rhythm with no gallop rhythm or cardiac murmur. A benign abdominal examination is present with the exception of obesity and minimal pretibial edema present.     Diagnostic Assessment and Plan: On a clinical basis, the patient presents with symptoms of chest discomfort compatible with that of myocardial ischemia the face of additional symptomatology consistent with that of exacerbation of chronic obstructive lung disease as well as that of suboptimal blood pressure control with increasing myocardial oxygen demands. Exclusively on the basis of his troponin levels, a potential non-ST elevation myocardial infarction cannot be excluded.  On immediate basis, stabilization of his respiratory status appears indicated with assessment of a repeat electrocardiogram and cardiac biomarkers inclusive of a proBNP level. On the basis of volume overload, adjustment of his diuretics and conversion from that of furosemide to bumetanide would be advisable with careful monitoring of his volume status as well as that of renal function and electrolytes. Delay of ischemic assessment least of the next 24 hours would be advisable and based on his clinical course a decision regarding that of a functional assessment with a gated vasodilator myocardial perfusion imaging study or that of coronary angiography especially if recurrent symptomatology were to occur. Time of evaluation, an extensive discussion regarding his needs of appropriate lifestyle modification both related to smoking cessation and weight reduction to benefit diastolic cardiac performance and the management of his obstructive sleep apnea have been reinforced with needs of a repeat formal sleep assessment based on his weight gain to assure adequate settings of his nocturnal CPAP have been maintained and not further exacerbating his diastolic heart failure. Ongoing aggressive risk factor modification of blood pressure and serum lipids will remain essential to reducing risk of future atherosclerotic development.     Thank you for allowing me to participate in your patient's care. Please feel free to contact me if you have any questions or concerns.     Christian Penn, [address] [facility]

## 2021-08-27 NOTE — CONSULTS
[place]  Department of Internal Medicine  Division of Pulmonary, Critical Care and Sleep Medicine  Consult Note    Suni De Los Santos DO, MPH, Maddie Guzman MD, [facility]  [facility] [place] [facility]      Patient: Marcelino Rodriguez  MRN: [id number]  : 1965    Encounter Time: 4:02 PM     Date of Admission: 2021 11:13 PM    Primary Care Physician: Mary Marques MD    Reason for Consultation: SOB      HISTORY OF PRESENT ILLNESS : Marcelino Rodriguez 49 y.o. male was seen in consultation regarding the above chief compliant. The patient is a 42-year-old white with a known history of coronary atherosclerosis with coronary intervention of his right coronary distribution in  with an associated 50% stenosis of the proximal left anterior descending. He has subsequently undergone no additional ischemic assessment, albeit a previous recommended myocardial perfusion imaging study with most recent objective assessment that of an echocardiogram in  demonstrating evidence of a normal-sized left ventricular chamber with severe concentric left ventricular hypertrophy and mild left ventricular systolic dysfunction with an estimated ejection fraction of 45% with stage III diastolic dysfunction and no significant valvular pathology. He additionally has a history of chronic diastolic heart failure in the face of hypertension, hyperlipidemia, chronic obstructive lung disease with ongoing tobacco consumption, morbid obesity and obstructive sleep apnea with compliance with the use of nocturnal CPAP albeit with a 30 pound weight gain since his recollection of most recent assessment. He remained compensated time of his most recent outpatient assessment approximately 9 months earlier with lack of follow-up in part on the basis of financial constraints.  He remained in his usual state of health until the past 10 days when he began to note the onset of a nonproductive cough associated with an increase of his chronic exertional dyspnea was evaluated in the emergency room with assessment inclusive of a chest x-ray suggesting chronic interstitial changes and a contrast CT angiogram demonstrated no evidence of significant abnormalities. At that time, he reported no significant chest discomfort or other ischemic equivalents with mildly elevated high-sensitivity troponin levels in a pattern not indicative of an acute coronary syndrome and a stable proBNP level less than that of previous assessment. He was eventually prescribed bronchodilator therapy but his noted minimal changes of his respiratory status. The exception of related to bronchodilator treatments, he denies any interim chest discomfort but relates on sporadic occasions including on the day of his assessment the development of precordial tightness during respiratory treatments radiating into his left axilla and upper extremity and on the day of hospitalization associated with diaphoresis. On his basis, he again presented to the emergency room where a resting electrocardiogram demonstrated evidence of sinus rhythm with evidence of left ventricular hypertrophy and nonspecific ST changes upon review and not significantly altered from that of his previous tracing chest x-ray again reviewed demonstrating no evidence of cardiomegaly with chronic interstitial changes. Initial elevated high-sensitivity troponin level was noted with a significant increase on follow-up and with elevation of total CPK levels and a normal myocardial index and pending proBNP level. He has been afebrile throughout hospitalization without evidence of a leukocytosis and a viral respiratory panel has excluded the presence of a viral infection including the exclusion of a COVID-19 infection. The exception of related to an additional respiratory treatment, he is experienced no further chest discomfort.  He relates no additional manifestations of decompensated left ventricular systolic dysfunction with chronic minimal lower extremity edema noted. CT chest was negative for pulmonary embolism but showed atelectasis. Pulmonary was consutled. PAST MEDICAL HISTORY:  has a past medical history of Accelerated hypertension, Arthritis, CAD (coronary artery disease), Cerebral artery occlusion with cerebral infarction ([facility] [address] CHF (congestive heart failure) ([facility] [address] [facility] Depression with anxiety, Fibromyalgia, GI bleeding, Hyperlipidemia, Hypertension, Myocardial infarct [facility]), Sleep apnea, and Urinary frequency. SURGICAL HISTORY:  has a past surgical history that includes Tonsillectomy; Colonoscopy (08/18/2017); Endoscopy, colon, diagnostic (08/18/2017); Cardiac catheterization (03/16/2017); Cardiac catheterization (04/12/2018); pr [facility] incl fluor gdnce dx w/cell washg spx (N/A, 10/22/2018); pr laryngoscopy,dirct,op scope,biopsy (N/A, 11/1/2018); and Coronary angioplasty with stent (09/13/2019). SOCIAL HISTORY:  reports that he has been smoking cigarettes. He has a 16.50 pack-year smoking history. He has never used smokeless tobacco. He reports current alcohol use of about 6.0 standard drinks of alcohol per week. He reports current drug use. Drug: Marijuana. FAMILY  HISTORY: family history includes Heart Disease in his mother; No Known Problems in his brother, brother, sister, and sister; Other in his father. MEDICATIONS:    Prior to Admission medications    Medication Sig Start Date End Date Taking?  Authorizing Provider   albuterol sulfate HFA (VENTOLIN HFA) 108 (90 Base) MCG/ACT inhaler Inhale 2 puffs into the lungs 4 times daily as needed for Wheezing 8/16/21  Yes Ofelia Jiang, DO   QVAR REDIHALER 80 MCG/ACT AERB inhaler INHALE ONE PUFF BY MOUTH EVERY 12 HOURS WITH SPACER 1/6/21  Yes Historical Provider, MD   furosemide (LASIX) 40 MG tablet TAKE ONE TABLET BY MOUTH EVERY DAY 1/4/21  Yes Historical Provider, MD   traZODone (DESYREL) 50 MG tablet TAKE TWO TABLETS BY MOUTH EVERY DAY 12/30/20  Yes Historical Provider, MD   amLODIPine (NORVASC) 10 MG tablet Take 1 tablet by mouth daily 1/11/21  Yes Steven Hdez MD   albuterol (PROVENTIL) (2.5 MG/3ML) 0.083% nebulizer solution Take 3 mLs by nebulization every 6 hours as needed for Wheezing 1/14/20  Yes YUMIKO Pink CNP   metoprolol succinate (TOPROL XL) 50 MG extended release tablet Take 1 tablet by mouth 2 times daily 1/14/20  Yes YUMIKO Pink CNP   aspirin 81 MG tablet Take 81 mg by mouth daily    Yes Historical Provider, MD   budesonide-formoterol (SYMBICORT) 160-4.5 MCG/ACT AERO Inhale 2 puffs into the lungs 2 times daily  Patient not taking: Reported on 1/11/2021 1/14/20   YUMIKO Pink CNP   atorvastatin (LIPITOR) 40 MG tablet Take 1 tablet by mouth daily  Patient not taking: Reported on 1/11/2021 1/14/20   YUMIKO Pink CNP   ranitidine (ZANTAC) 300 MG tablet Take 1 tablet by mouth nightly  Patient not taking: Reported on 1/11/2021 9/14/19   Aggei Zamora DO   losartan (COZAAR) 100 MG tablet Take 1 tablet by mouth nightly  Patient not taking: Reported on 1/11/2021 7/10/18   Sukhi Hooper MD       ALLERGIES: Pcn [penicillins]       REVIEW OF SYSTEMS:  Otherwise negative if not reported or listed below  Constitutional:  No unanticipated weight loss. No change in sleep pattern or activity. No fevers, chills or rigors. Eyes:    No visual changes or diplopia. No scleral icterus. ENT:    No Headaches, hearing loss or vertigo. No mouth sores or sore throat. Cardiovascular:  + chest discomfort, palpitations. Respiratory:  + cough, No wheezing      No sputum production. No hemoptysis, pleuritic pain. Gastrointestinal:  No abdominal pain, appetite loss, blood in stools. No hematemesis  Genitourinary:  No dysuria, trouble voiding or hematuria.       No nocturia. Musculoskeletal:   No weakness or joint complaints. Integumentary: No rashes or pruritis. Neurological:  No headache, numbness or tingling. Psychiatric:   No effect on mood, memory, mentation, or behavior. No anxiety or depression. Endocrine:   No excessive thirst, fluid intake, or urination. No tremor. Hematologic: No abnormal bruising or bleeding. Lymphatic:  No swollen lymph nodes. Immunologic:  No hives or hx of anaphaxsis. OBJECTIVE:     PHYSICAL EXAM:   VITALS:   Vitals:    08/27/21 0916 08/27/21 1015 08/27/21 1200 08/27/21 1433   BP:  (!) 176/92 (!) 150/96 (!) 173/115   Pulse:   61 65   Resp: 23  20 18   Temp:   97.8 °F (36.6 °C) 97.8 °F (36.6 °C)   TempSrc:   Temporal Temporal   SpO2:   98% 93%   Weight:       Height:            Intake/Output Summary (Last 24 hours) at 8/27/2021 1602  Last data filed at 8/27/2021 1441  Gross per 24 hour   Intake 420 ml   Output 875 ml   Net -455 ml        CONSTITUTIONAL:   A&O x 3, NAD  SKIN:     No rash, No suspicious lesions or skin discoloration  HEENT:     EOMI, MMM, No thrush  NECK:    No bruits, No JVP apprechiated  CV:      Sinus,  No murmur, No rubs, No gallops  PULMONARY:   Couse BS,  No Wheezing, No Rales, No Rhonchi      No noted egophony  ABDOMEN:     Soft, non-tender. BS normal. No R/R/G  EXT:    No deformities . No clubbing.       + lower extremity edema, No venous stasis  PULSE:   Appears equal and palpable.   PSYCHIATRIC:  Seems appropriate, No acute psycosis  MS:    No fractures, No gross weakness  NEUROLOGIC:   The clinical assessment is non-focal     DATA: IMAGING & TESTING:     LABORATORY TESTS:    CBC:   Lab Results   Component Value Date    WBC 7.7 08/26/2021    RBC 4.25 08/26/2021    HGB 13.7 08/26/2021    HCT 39.6 08/26/2021    MCV 93.2 08/26/2021    MCH 32.2 08/26/2021    MCHC 34.6 08/26/2021    RDW 12.8 08/26/2021     08/26/2021    MPV 9.4 08/26/2021     CMP:    Lab Results   Component Value Date     08/26/2021    K 3.6 08/26/2021     08/26/2021    CO2 24 08/26/2021    BUN 14 08/26/2021    CREATININE 1.0 08/26/2021    GFRAA >60 08/26/2021    LABGLOM >60 08/26/2021    GLUCOSE 254 08/26/2021    PROT 6.7 08/26/2021    LABALBU 3.9 08/26/2021    CALCIUM 8.8 08/26/2021    BILITOT 0.2 08/26/2021    ALKPHOS 94 08/26/2021    AST 30 08/26/2021    ALT 37 08/26/2021     PT/INR:    Lab Results   Component Value Date    PROTIME 11.7 09/26/2020    INR 1.0 09/26/2020     ABG:    Lab Results   Component Value Date    PH 7.438 02/14/2018    PCO2 32.5 02/14/2018    PO2 94.2 02/14/2018    HCO3 21.5 02/14/2018    BE -1.6 02/14/2018    O2SAT 97.6 02/14/2018        PRO-BNP:   Lab Results   Component Value Date    PROBNP 590 (H) 08/15/2021    PROBNP 1,619 (H) 12/29/2020      ABGs:   Lab Results   Component Value Date    PH 7.438 02/14/2018    PO2 94.2 02/14/2018    PCO2 32.5 02/14/2018     Hemoglobin A1C: No components found for: HGBA1C    IMAGING:  Imaging tests were completed and reviewed and discussed radiology and care team involved and reveals     CT ABDOMEN PELVIS WO CONTRAST Additional Contrast? None  Result Date: 8/27/2021  FINDINGS: Lower Chest: Mild bibasilar dependent atelectasis. Coronary artery calcifications noted. Organs: Marked diffuse hepatic steatosis with focal fat sparing adjacent to the gallbladder fossa. Unremarkable spleen, pancreas, adrenals, and gallbladder on this unenhanced study. Stable left renal cysts measuring up to 1.5 cm. Stable partially rim calcified cyst in the lateral cortex of the right kidney. Lorean Snare GI/Bowel: Normal appendix. Distal colonic diverticula. No acute diverticulitis. Bowel loops nonobstructed. Pelvis: Enlarged prostate gland. Correlation with rectal examination and serum PSA levels recommended. Incompletely distended urinary bladder. Fat containing inguinal hernias bilaterally. Peritoneum/Retroperitoneum: No free air or free fluid. Vascular calcification.   Stable mild fusiform dilatation of infrarenal abdominal aorta measuring 3.1 x 3.1 cm in axial plane. No adenopathy. Bones/Soft Tissues: Multilevel thoracolumbar spondylosis. Posterior disc bulges in the lower lumbar spine. Tiny bone islands in the right femoral head. Marked diffuse hepatic steatosis. Distal colonic diverticula. No acute diverticulitis. Normal appendix. Enlarged prostate. Correlation with rectal examination and serum PSA levels recommended. Stable mild fusiform aneurysmal dilatation of the infrarenal abdominal aorta. CT SOFT TISSUE NECK W CONTRAST  Result Date: 8/16/2021  No acute abnormality of the soft tissue structures of the neck. Mucosal thickening in the ethmoid air cells and maxillary sinuses. XR CHEST PORTABLE    Result Date: 8/27/2021  FINDINGS: Pulmonary Arteries: Contrast bolus is heterogeneous likely related artifact from patient motion and patient's body habitus. This does limit assessment, especially of the smaller peripheral pulmonary arteries but allowing for this, no identified pulmonary embolism. Mediastinum: No evidence of mediastinal lymphadenopathy. The heart and pericardium demonstrate no acute abnormality. There is no acute abnormality of the thoracic aorta. Scattered vascular calcifications. Mild cardiomegaly. Lungs/pleura: No pneumothorax. Assessment of the lungs is somewhat limited by patient motion artifact. Trace dependent atelectasis. No significant consolidation. Upper Abdomen: Thickening of the adrenal glands, likely due to hyperplasia. Visualized portions of the upper abdomen demonstrate no acute abnormality. Soft Tissues/Bones: Degenerative changes are scattered in the spine. Exam somewhat limited by patient motion. No identified pulmonary embolism. Trace dependent atelectasis but no acute process otherwise. Assessment:   1. SOB  2. JARRET   3. Morbid obesity  4. Abnromal troponin's  5. Smoker  6. Hepatic steatosis  7.  Chest Pain  8. HTN  9. Hyperlipidemia        Plan:   1. Check procalcitionin  2. CPAP at night  3. Emperic abx  4. Cardiology consult rule out ischemia  5. Needs full PFTs  6.  Bronchodilators          Ellen Montes DO DO, MPH, Rochelle Clarke  Professor of Internal Medicine  Pulmonary, Critical Care and Sleep Medicine

## 2021-08-27 NOTE — ACP (ADVANCE CARE PLANNING)
Advance Care Planning   Healthcare Decision Maker:    Primary Decision Maker: Kaur Luisachris - [phone]    Secondary Decision Maker: Herman Shields - Brother/Sister - [phone]    Patient's wife is his POA.     Electronically signed by Aldo Irene RN/ on 8/27/2021 at 10:54 AM

## 2021-08-27 NOTE — PROGRESS NOTES
Increased cpap to setting  Of 8cm per patient request for more flow and the get a good volume. RN notified.

## 2021-08-27 NOTE — H&P
Francis Hu is a 49 y.o. male  Chief Complaint   Patient presents with    Chest Pain     midsternal heaviness, radiating down left arm. onset sometime around 2200, after finishing a breathing treatment. reports extensive cardiac hx     HPI  As above, he is c/o sob and cough. He states he took a breathing treatment last night around 10 pm and he got chest pain radiating into his L arm. This am he is still sob with any exertion. He does admit he has not been taking his medications as prescribed due to the cost.  He has not taken his diuretic in over 3 months, and now has water weight gain. He denies n/v/d/c, fever/chills, or sweats. He has had had both vaccines for COVID. No current facility-administered medications on file prior to encounter.      Current Outpatient Medications on File Prior to Encounter   Medication Sig Dispense Refill    albuterol sulfate HFA (VENTOLIN HFA) 108 (90 Base) MCG/ACT inhaler Inhale 2 puffs into the lungs 4 times daily as needed for Wheezing 1 Inhaler 0    QVAR REDIHALER 80 MCG/ACT AERB inhaler INHALE ONE PUFF BY MOUTH EVERY 12 HOURS WITH SPACER      furosemide (LASIX) 40 MG tablet TAKE ONE TABLET BY MOUTH EVERY DAY      traZODone (DESYREL) 50 MG tablet TAKE TWO TABLETS BY MOUTH EVERY DAY      amLODIPine (NORVASC) 10 MG tablet Take 1 tablet by mouth daily 90 tablet 5    albuterol (PROVENTIL) (2.5 MG/3ML) 0.083% nebulizer solution Take 3 mLs by nebulization every 6 hours as needed for Wheezing 120 each 3    metoprolol succinate (TOPROL XL) 50 MG extended release tablet Take 1 tablet by mouth 2 times daily 60 tablet 3    aspirin 81 MG tablet Take 81 mg by mouth daily       cloNIDine (CATAPRES) 0.1 MG tablet Take 1 tablet by mouth three times daily 90 tablet 3    budesonide-formoterol (SYMBICORT) 160-4.5 MCG/ACT AERO Inhale 2 puffs into the lungs 2 times daily (Patient not taking: Reported on 1/11/2021) 1 Inhaler 3    atorvastatin (LIPITOR) 40 MG tablet Take 1 tablet by mouth daily (Patient not taking: Reported on 1/11/2021) 90 tablet 1    ticagrelor (BRILINTA) 90 MG TABS tablet Take 1 tablet by mouth 2 times daily (Patient not taking: Reported on 1/11/2021) 60 tablet 1    ranitidine (ZANTAC) 300 MG tablet Take 1 tablet by mouth nightly (Patient not taking: Reported on 1/11/2021) 30 tablet 3    losartan (COZAAR) 100 MG tablet Take 1 tablet by mouth nightly (Patient not taking: Reported on 1/11/2021) 30 tablet 2     Allergies   Allergen Reactions    Pcn [Penicillins] Other (See Comments)     As a child, does not remembered       Past Medical History:   Diagnosis Date    Accelerated hypertension 5/5/2017    Arthritis     CAD (coronary artery disease)     Cerebral artery occlusion with cerebral infarction ([facility]     TIA    CHF (congestive heart failure) ([facility]     COPD (chronic obstructive pulmonary disease) ([facility]     Depression with anxiety     Fibromyalgia     GI bleeding     Hyperlipidemia     Hypertension     Myocardial infarct ([facility]     in past, per testing, date unknown    Sleep apnea     cpap    Urinary frequency      Past Surgical History:   Procedure Laterality Date    CARDIAC CATHETERIZATION  03/16/2017    Dr Jada Pruitt  04/12/2018    Dr. Joey Kemp  08/18/2017    CORONARY ANGIOPLASTY WITH STENT PLACEMENT  09/13/2019    rca distal 4.5x 30    ENDOSCOPY, COLON, DIAGNOSTIC  08/18/2017    [place] [address] INCL FLUOR GDNCE DX W/CELL WASHG SPX N/A 10/22/2018    DIRECT LARYNGOSCOPY performed by Shari Montalvo DO [facility]    LA LARYNGOSCOPY,DIRCT,OP SCOPE,BIOPSY N/A 11/1/2018    MICRO LARYNGOSCOPY WITH BIOPSY TRUE VOCAL CORD LESION performed by Esperanza Carey DO at [facility] OR    TONSILLECTOMY       Social History     Socioeconomic History    Marital status: Single     Spouse name: Not on file    Number of children: Not on file    Years of education: Not on file    Highest education level: Not on file   Occupational History    Not on file   Tobacco Use    Smoking status: Current Every Day Smoker     Packs/day: 0.50     Years: 33.00     Pack years: 16.50     Types: Cigarettes    Smokeless tobacco: Never Used   Vaping Use    Vaping Use: Never used   Substance and Sexual Activity    Alcohol use: Yes     Alcohol/week: 6.0 standard drinks     Types: 6 Cans of beer per week     Comment: social    Drug use: Yes     Types: Marijuana     Comment: once a week    Sexual activity: Not on file   Other Topics Concern    Not on file   Social History Narrative    Drinks occ [employer]. Social Determinants of Health     Financial Resource Strain:     Difficulty of Paying Living Expenses:    Food Insecurity:     Worried About Running Out of Food in the Last Year:     [address] in the Last Year:    Transportation Needs:     Lack of Transportation (Medical):      Lack of Transportation (Non-Medical):    Physical Activity:     Days of Exercise per Week:     Minutes of Exercise per Session:    Stress:     Feeling of Stress :    Social Connections:     Frequency of Communication with Friends and Family:     Frequency of Social Gatherings with Friends and Family:     Attends Religion Services:     Active Member of Clubs or Organizations:     Attends Club or Organization Meetings:     Marital Status:    Intimate Partner Violence:     Fear of Current or Ex-Partner:     Emotionally Abused:     Physically Abused:     Sexually Abused:      Family History   Problem Relation Age of Onset    Heart Disease Mother     Other Father     No Known Problems Sister     No Known Problems Brother     No Known Problems Sister     No Known Problems Brother          ROS  Patient positive for  SOB, chest pain   Patient denies any fever, chills, night sweats, weight changes   Denies headache, visual changes,   Denies dysphagia, odynophagia dysphonia,   Denies  cough, sputum production,   Denies  pressure, orthopnea, palpitations,   Denies abd pain, N/V/D/C/melena, hematochezia,   Denies urinary frequency, urgency, dysuria, hematuria,   Denies any acute muscle aches, paresthesias, weakness, seizure, syncopal episodes, Denies depression, anxiety. OBJECTIVE  Vitals:    08/27/21 0445   BP: (!) 179/111   Pulse: 59   Resp: 18   Temp: 97.8 °F (36.6 °C)   SpO2: 95%     Gen: AO3, NAD  Head: AT/NC, PERRL, EOMIx2, no icterus, conjunctival injection  Neck: No JVD, carotid bruits, LAD, thyroid non-palpable  Heart: RRR with no murmurs, rubs, gallops  Lungs: scattered rhonchi  Abd: soft, NT, ND, BS+, no G/R, no HSM  Ext: No C/C +edema lower  pulses intact distally B/L  Neuro: CN 2-12 grossly intact with no focal deficits  Lab Results   Component Value Date    WBC 7.7 08/26/2021    HGB 13.7 08/26/2021    HCT 39.6 08/26/2021    MCV 93.2 08/26/2021     08/26/2021     Lab Results   Component Value Date     08/26/2021    K 3.6 08/26/2021     08/26/2021    CO2 24 08/26/2021    BUN 14 08/26/2021    CREATININE 1.0 08/26/2021    GLUCOSE 254 08/26/2021    CALCIUM 8.8 08/26/2021      CT ABDOMEN PELVIS WO CONTRAST Additional Contrast? None  Narrative: EXAMINATION:  CT OF THE ABDOMEN AND PELVIS WITHOUT CONTRAST 8/27/2021 1:29 am    TECHNIQUE:  CT of the abdomen and pelvis was performed without the administration of  intravenous contrast. Multiplanar reformatted images are provided for review. Dose modulation, iterative reconstruction, and/or weight based adjustment of  the mA/kV was utilized to reduce the radiation dose to as low as reasonably  achievable. COMPARISON:  None.     HISTORY:  ORDERING SYSTEM PROVIDED HISTORY: Abdominal pain  TECHNOLOGIST PROVIDED HISTORY:  Reason for exam:->Abdominal pain  Additional Contrast?->None  Decision Support Exception - unselect if not a suspected or confirmed  emergency medical condition->Emergency Medical Condition (MA)  What reading provider will be dictating this exam?->CRC    FINDINGS:  Lower Chest: Mild bibasilar dependent atelectasis. Coronary artery  calcifications noted. Organs: Marked diffuse hepatic steatosis with focal fat sparing adjacent to  the gallbladder fossa. Unremarkable spleen, pancreas, adrenals, and  gallbladder on this unenhanced study. Stable left renal cysts measuring up  to 1.5 cm. Stable partially rim calcified cyst in the lateral cortex of the  right kidney. Bula Dayhoff GI/Bowel: Normal appendix. Distal colonic diverticula. No acute  diverticulitis. Bowel loops nonobstructed. Pelvis: Enlarged prostate gland. Correlation with rectal examination and  serum PSA levels recommended. Incompletely distended urinary bladder. Fat  containing inguinal hernias bilaterally. Peritoneum/Retroperitoneum: No free air or free fluid. Vascular  calcification. Stable mild fusiform dilatation of infrarenal abdominal aorta  measuring 3.1 x 3.1 cm in axial plane. No adenopathy. Bones/Soft Tissues: Multilevel thoracolumbar spondylosis. Posterior disc  bulges in the lower lumbar spine. Tiny bone islands in the right femoral  head. Impression: Marked diffuse hepatic steatosis. Distal colonic diverticula. No acute diverticulitis. Normal appendix. Enlarged prostate. Correlation with rectal examination and serum PSA levels  recommended. Stable mild fusiform aneurysmal dilatation of the infrarenal abdominal aorta. XR CHEST PORTABLE  Narrative: EXAMINATION:  ONE XRAY VIEW OF THE CHEST    8/27/2021 12:38 am    COMPARISON:  None. HISTORY:  ORDERING SYSTEM PROVIDED HISTORY: chest pain  TECHNOLOGIST PROVIDED HISTORY:  Reason for exam:->chest pain  What reading provider will be dictating this exam?->CRC    FINDINGS:  The lungs are without acute focal process. There is no effusion or  pneumothorax. The cardiomediastinal silhouette is without acute process. The  osseous structures are without acute process. Impression: No acute process.     Lab Results   Component Value Date    CKTOTAL 72 05/04/2017    CKMB 1.8 05/04/2017    TROPONINI <0.01 12/29/2020       ASSESSMENT  1. Abdominal pain, unspecified abdominal location    2. Chest pain, unspecified type    3.  COPD  PLAN  Will consult Pulm and Cardiology teams  Observation status  Discussed importance of compliance with medications

## 2021-08-28 VITALS
HEIGHT: 72 IN | RESPIRATION RATE: 24 BRPM | HEART RATE: 68 BPM | WEIGHT: 315 LBS | OXYGEN SATURATION: 95 % | BODY MASS INDEX: 42.66 KG/M2 | SYSTOLIC BLOOD PRESSURE: 125 MMHG | DIASTOLIC BLOOD PRESSURE: 93 MMHG | TEMPERATURE: 98.1 F

## 2021-08-28 LAB
BASOPHILS ABSOLUTE: 0.04 E9/L (ref 0–0.2)
BASOPHILS RELATIVE PERCENT: 0.5 % (ref 0–2)
EOSINOPHILS ABSOLUTE: 0.13 E9/L (ref 0.05–0.5)
EOSINOPHILS RELATIVE PERCENT: 1.6 % (ref 0–6)
HBA1C MFR BLD: 8.2 % (ref 4–5.6)
HCT VFR BLD CALC: 43.3 % (ref 37–54)
HEMOGLOBIN: 14.6 G/DL (ref 12.5–16.5)
IMMATURE GRANULOCYTES #: 0.04 E9/L
IMMATURE GRANULOCYTES %: 0.5 % (ref 0–5)
LYMPHOCYTES ABSOLUTE: 1.71 E9/L (ref 1.5–4)
LYMPHOCYTES RELATIVE PERCENT: 20.4 % (ref 20–42)
MCH RBC QN AUTO: 31.8 PG (ref 26–35)
MCHC RBC AUTO-ENTMCNC: 33.7 % (ref 32–34.5)
MCV RBC AUTO: 94.3 FL (ref 80–99.9)
MONOCYTES ABSOLUTE: 0.6 E9/L (ref 0.1–0.95)
MONOCYTES RELATIVE PERCENT: 7.2 % (ref 2–12)
NEUTROPHILS ABSOLUTE: 5.86 E9/L (ref 1.8–7.3)
NEUTROPHILS RELATIVE PERCENT: 69.8 % (ref 43–80)
PDW BLD-RTO: 12.8 FL (ref 11.5–15)
PLATELET # BLD: 191 E9/L (ref 130–450)
PMV BLD AUTO: 9.4 FL (ref 7–12)
RBC # BLD: 4.59 E12/L (ref 3.8–5.8)
WBC # BLD: 8.4 E9/L (ref 4.5–11.5)

## 2021-08-28 PROCEDURE — G0378 HOSPITAL OBSERVATION PER HR: HCPCS

## 2021-08-28 PROCEDURE — 85025 COMPLETE CBC W/AUTO DIFF WBC: CPT

## 2021-08-28 PROCEDURE — 36415 COLL VENOUS BLD VENIPUNCTURE: CPT

## 2021-08-28 PROCEDURE — 6370000000 HC RX 637 (ALT 250 FOR IP): Performed by: INTERNAL MEDICINE

## 2021-08-28 PROCEDURE — 83036 HEMOGLOBIN GLYCOSYLATED A1C: CPT

## 2021-08-28 RX ADMIN — HYDRALAZINE HYDROCHLORIDE 25 MG: 25 TABLET, FILM COATED ORAL at 06:01

## 2021-08-28 RX ADMIN — HYDROCODONE BITARTRATE AND ACETAMINOPHEN 1 TABLET: 5; 325 TABLET ORAL at 06:01

## 2021-08-28 ASSESSMENT — PAIN SCALES - GENERAL: PAINLEVEL_OUTOF10: 7

## 2021-08-28 NOTE — FLOWSHEET NOTE
Dr. Kat Nguyen notified that patient is leaving [place]. Patient states he has a wedding to attend and he can do his testing outpatient. Patient instructed to call PCP.

## 2021-08-28 NOTE — DISCHARGE SUMMARY
Physician Discharge Summary     Patient ID:  Henri Negrete  [id number]  49 y.o.  1965    Admit date: 8/26/2021    Discharge date and time: 8/28/2021  Admitting Physician: Sukhi Hooper MD     Discharge Physician: Simona Terry MD      Admission Diagnoses: Chest pain [R07.9]  Abdominal pain, unspecified abdominal location [R10.9]  Chest pain, unspecified type [R07.9]    Discharge Diagnoses:   Non-ST elevation MI  Hypertensive emergency  Diabetes mellitus type II  Morbid obesity  Hyperlipidemia  CAD  BPH  Fatty liver  mild fusiform aneurysmal dilatation of the infrarenal abdominal aorta. Admission Condition: poor    Discharged Condition: fair    Indication for Admission: Chest pain [R07.9]  Abdominal pain, unspecified abdominal location [R10.9]  Chest pain, unspecified type [R07.9]    Hospital Course: Patient was seen in consultation by cardiology. Medications were changed. Look like he had Non-ST elevation MI. The patient left before further testing could be done AMA. Consults: cardiology    Significant Diagnostic Studies: labs:  As below    Lab Results   Component Value Date     08/26/2021    K 3.6 08/26/2021     08/26/2021    CO2 24 08/26/2021    BUN 14 08/26/2021    CREATININE 1.0 08/26/2021    GLUCOSE 254 (H) 08/26/2021    CALCIUM 8.8 08/26/2021    PROT 6.7 08/26/2021    LABALBU 3.9 08/26/2021    BILITOT 0.2 08/26/2021    ALKPHOS 94 08/26/2021    AST 30 08/26/2021    ALT 37 08/26/2021    LABGLOM >60 08/26/2021    GFRAA >60 08/26/2021          Lab Results   Component Value Date    WBC 8.4 08/28/2021    HGB 14.6 08/28/2021    HCT 43.3 08/28/2021    MCV 94.3 08/28/2021     08/28/2021    Troponins were elevated    CT ABDOMEN PELVIS WO CONTRAST Additional Contrast? None    Result Date: 8/27/2021  EXAMINATION: CT OF THE ABDOMEN AND PELVIS WITHOUT CONTRAST 8/27/2021 1:29 am TECHNIQUE: CT of the abdomen and pelvis was performed without the administration of intravenous contrast. Multiplanar reformatted images are provided for review. Dose modulation, iterative reconstruction, and/or weight based adjustment of the mA/kV was utilized to reduce the radiation dose to as low as reasonably achievable. COMPARISON: None. HISTORY: ORDERING SYSTEM PROVIDED HISTORY: Abdominal pain TECHNOLOGIST PROVIDED HISTORY: Reason for exam:->Abdominal pain Additional Contrast?->None Decision Support Exception - unselect if not a suspected or confirmed emergency medical condition->Emergency Medical Condition (MA) What reading provider will be dictating this exam?->CRC FINDINGS: Lower Chest: Mild bibasilar dependent atelectasis. Coronary artery calcifications noted. Organs: Marked diffuse hepatic steatosis with focal fat sparing adjacent to the gallbladder fossa. Unremarkable spleen, pancreas, adrenals, and gallbladder on this unenhanced study. Stable left renal cysts measuring up to 1.5 cm. Stable partially rim calcified cyst in the lateral cortex of the right kidney. Crystal Ramonita GI/Bowel: Normal appendix. Distal colonic diverticula. No acute diverticulitis. Bowel loops nonobstructed. Pelvis: Enlarged prostate gland. Correlation with rectal examination and serum PSA levels recommended. Incompletely distended urinary bladder. Fat containing inguinal hernias bilaterally. Peritoneum/Retroperitoneum: No free air or free fluid. Vascular calcification. Stable mild fusiform dilatation of infrarenal abdominal aorta measuring 3.1 x 3.1 cm in axial plane. No adenopathy. Bones/Soft Tissues: Multilevel thoracolumbar spondylosis. Posterior disc bulges in the lower lumbar spine. Tiny bone islands in the right femoral head. Marked diffuse hepatic steatosis. Distal colonic diverticula. No acute diverticulitis. Normal appendix. Enlarged prostate. Correlation with rectal examination and serum PSA levels recommended. Stable mild fusiform aneurysmal dilatation of the infrarenal abdominal aorta.      CT SOFT TISSUE NECK W CONTRAST    Result Date: 8/16/2021  EXAMINATION: CT OF THE NECK SOFT TISSUE WITH CONTRAST  8/16/2021 TECHNIQUE: CT of the neck was performed with the administration of intravenous contrast. Multiplanar reformatted images are provided for review. Dose modulation, iterative reconstruction, and/or weight based adjustment of the mA/kV was utilized to reduce the radiation dose to as low as reasonably achievable. COMPARISON: None. HISTORY: ORDERING SYSTEM PROVIDED HISTORY: throat swelling FINDINGS: PHARYNX/LARYNX:  The palatine tonsils are normal in appearance. The tongue is normal in appearance. The valleculae, epiglottis, aryepiglottic folds and pyriform sinuses appear unremarkable. The true and false vocal cords are normal in appearance. No mass or abscess is seen. SALIVARY GLANDS/THYROID:  The parotid and submandibular glands appear unremarkable. The thyroid gland appears unremarkable. LYMPH NODES:  No cervical or supraclavicular lymphadenopathy is seen. SOFT TISSUES:  No appreciable soft tissue swelling or mass is seen. BRAIN/ORBITS/SINUSES:  The visualized portion of the intracranial contents appear unremarkable. Mucosal thickening in the ethmoid air cells and maxillary sinuses. The visualized portion of the orbits, and mastoid air cells demonstrate no acute abnormality. LUNG APICES/SUPERIOR MEDIASTINUM:  No focal consolidation is seen within the visualized lung apices. No superior mediastinal lymphadenopathy or mass. The visualized portion of the trachea appears unremarkable. BONES:  No aggressive appearing lytic or blastic bony lesion. No acute abnormality of the soft tissue structures of the neck. Mucosal thickening in the ethmoid air cells and maxillary sinuses. XR CHEST PORTABLE    Result Date: 8/27/2021  EXAMINATION: ONE XRAY VIEW OF THE CHEST 8/27/2021 12:38 am COMPARISON: None.  HISTORY: ORDERING SYSTEM PROVIDED HISTORY: chest pain TECHNOLOGIST PROVIDED HISTORY: Reason for exam:->chest pain What reading provider will be dictating this exam?->CRC FINDINGS: The lungs are without acute focal process. There is no effusion or pneumothorax. The cardiomediastinal silhouette is without acute process. The osseous structures are without acute process. No acute process. XR CHEST PORTABLE    Result Date: 8/15/2021  EXAMINATION: ONE XRAY VIEW OF THE CHEST 8/15/2021 11:14 pm COMPARISON: 12/28/2020 HISTORY: ORDERING SYSTEM PROVIDED HISTORY: cough TECHNOLOGIST PROVIDED HISTORY: Reason for exam:->cough What reading provider will be dictating this exam?->CRC FINDINGS: The lungs are without acute focal process. There is no effusion or pneumothorax. Stable cardiomegaly. The osseous structures are without acute process. No acute process. Stable cardiomegaly     CTA PULMONARY W CONTRAST    Result Date: 8/16/2021  EXAMINATION: CTA OF THE CHEST 8/16/2021 2:08 am TECHNIQUE: CTA of the chest was performed after the administration of intravenous contrast.  Multiplanar reformatted images are provided for review. MIP images are provided for review. Dose modulation, iterative reconstruction, and/or weight based adjustment of the mA/kV was utilized to reduce the radiation dose to as low as reasonably achievable. COMPARISON: Radiographs dated 08/15/2021 and CT dated 09/26/2020 HISTORY: ORDERING SYSTEM PROVIDED HISTORY: SOB, cough TECHNOLOGIST PROVIDED HISTORY: Reason for exam:->SOB, cough Decision Support Exception - unselect if not a suspected or confirmed emergency medical condition->Emergency Medical Condition (MA) What reading provider will be dictating this exam?->CRC FINDINGS: Pulmonary Arteries: Contrast bolus is heterogeneous likely related artifact from patient motion and patient's body habitus. This does limit assessment, especially of the smaller peripheral pulmonary arteries but allowing for this, no identified pulmonary embolism. Mediastinum: No evidence of mediastinal lymphadenopathy.   The heart and pericardium demonstrate no acute abnormality. There is no acute abnormality of the thoracic aorta. Scattered vascular calcifications. Mild cardiomegaly. Lungs/pleura: No pneumothorax. Assessment of the lungs is somewhat limited by patient motion artifact. Trace dependent atelectasis. No significant consolidation. Upper Abdomen: Thickening of the adrenal glands, likely due to hyperplasia. Visualized portions of the upper abdomen demonstrate no acute abnormality. Soft Tissues/Bones: Degenerative changes are scattered in the spine. Exam somewhat limited by patient motion. No identified pulmonary embolism. Trace dependent atelectasis but no acute process otherwise. Outstanding Order Results     No orders found from 7/28/2021 to 8/27/2021.           Treatments: pressure medication    Discharge Exam:  BP (!) 125/93   Pulse 68   Temp 98.1 °F (36.7 °C) (Temporal)   Resp 24   Ht 6' (1.829 m)   Wt (!) 382 lb (173.3 kg)   SpO2 95%   BMI 51.81 kg/m²     General Appearance:    Alert, cooperative, no distress, appears stated age   Head:    Normocephalic, without obvious abnormality, atraumatic   Eyes:    PERRL, conjunctiva/corneas clear, EOM's intact, fundi     benign, both eyes        Ears:    Normal TM's and external ear canals, both ears   Nose:   Nares normal, septum midline, mucosa normal, no drainage    or sinus tenderness   Throat:   Lips, mucosa, and tongue normal; teeth and gums normal   Neck:   Supple, symmetrical, trachea midline, no adenopathy;        thyroid:  No enlargement/tenderness/nodules; no carotid    bruit or JVD   Back:     Symmetric, no curvature, ROM normal, no CVA tenderness   Lungs:     Clear to auscultation bilaterally, respirations unlabored   Chest wall:    No tenderness or deformity   Heart:    Regular rate and rhythm, S1 and S2 normal, no murmur, rub   or gallop   Abdomen:     Soft, non-tender, bowel sounds active all four quadrants,     no masses, no organomegaly   Genitalia: Normal male without lesion, discharge or tenderness   Rectal:    Normal tone, normal prostate, no masses or tenderness;    guaiac negative stool   Extremities:   Extremities normal, atraumatic, no cyanosis or edema   Pulses:   2+ and symmetric all extremities   Skin:   Skin color, texture, turgor normal, no rashes or lesions   Lymph nodes:   Cervical, supraclavicular, and axillary nodes normal   Neurologic:   CNII-XII intact. Normal strength, sensation and reflexes       throughout       Disposition: Patient left AMA    Patient Instructions:      Medication List      ASK your doctor about these medications    * albuterol (2.5 MG/3ML) 0.083% nebulizer solution  Commonly known as: PROVENTIL  Take 3 mLs by nebulization every 6 hours as needed for Wheezing     * albuterol sulfate  (90 Base) MCG/ACT inhaler  Commonly known as: Ventolin HFA  Inhale 2 puffs into the lungs 4 times daily as needed for Wheezing     amLODIPine 10 MG tablet  Commonly known as: NORVASC  Take 1 tablet by mouth daily     aspirin 81 MG tablet     atorvastatin 40 MG tablet  Commonly known as: LIPITOR  Take 1 tablet by mouth daily     budesonide-formoterol 160-4.5 MCG/ACT Aero  Commonly known as: Symbicort  Inhale 2 puffs into the lungs 2 times daily     furosemide 40 MG tablet  Commonly known as: LASIX     losartan 100 MG tablet  Commonly known as: COZAAR  Take 1 tablet by mouth nightly     metoprolol succinate 50 MG extended release tablet  Commonly known as: TOPROL XL  Take 1 tablet by mouth 2 times daily     Qvar RediHaler 80 MCG/ACT Aerb inhaler  Generic drug: beclomethasone     raNITIdine 300 MG tablet  Commonly known as: ZANTAC  Take 1 tablet by mouth nightly     traZODone 50 MG tablet  Commonly known as: DESYREL         * This list has 2 medication(s) that are the same as other medications prescribed for you. Read the directions carefully, and ask your doctor or other care provider to review them with you.              I would suggest outpatient stress test, renal Artery ultrasound  I would add [id number] mg TID, [id number] mg daily and increase [id number] mg Twice Daily  He needs PSA done given CAT scan finding  A1c came back today 8.2%. He needs glucometer. And treated for diabetes  I question if he is an alcoholic? He had agreed to stay for testing but renegedAnd left AMA  He may need a new CPAP machine, given what he told me  He understood the risk of his blood pressure and heart rate being elevated and causing cardiac problems  Patient may need to go to the CHF clinic     Activity: activity as tolerated  Diet: {diet: Cardiac  Follow-up with DR in 3 days.   Greater than 35 minutes spent on discharge preparations, examining patient, discussing with patient and coordinating with nurses and staff    Signed:  Alhaji Huff MD  8/28/2021  8:23 AM

## 2021-08-28 NOTE — PROGRESS NOTES
AMA note    PT SEEN AND EXAMINED. I saw the patient around 6 AM this morning. I explained to him that I thought he had a \"small heart attack. \" I also explained to him that we need to control his blood pressure and that I had added several medicines. Moreover I ordered an ultrasound of his kidney arteries. I explained him that I thought he was diabetic. He told me he was supposed to be on metformin but he never took it. He told me he wanted a glucometer. But his PCP never gave him one. I told him that we needed to keep his heart rate and blood pressure down to decreases risk of chest pain. At that time he had agreed to remain for the stress test. I told him we could do the ultrasound as an outpatient. He said nothing about a wedding earlier today. I was just paged at 8 AM saying the patient was leaving [place]. Nursing told him to follow-up with his PCP  Chart reviewed. meds reviewed. D/w nursing + family as available. EXAM: IN GENERAL, NAD. AWAKE AND ALERT. ROS NEGx10 EXCEPT:   BP (!) 125/93   Pulse 68   Temp 98.1 °F (36.7 °C) (Temporal)   Resp 24   Ht 6' (1.829 m)   Wt (!) 382 lb (173.3 kg)   SpO2 95%   BMI 51.81 kg/m²   GEN: A+O NAD. HEENT: NCAT. EOMI. BLANCA  NECK: NO JVD. TRACH MIDLINE. NO BRUITS. NO THYROMEGALY. LUNGS: CTA BL NO RALES, RHONCHI OR WHEEZES. GOOD EXCURSION. CV: Regular rate and rhythm, NO Murmurs, Rubs, Or gallops  ABD: Soft. Nontender. Normal bowel sounds. No organomegaly  EXT:No clubbing cyanosis or edema  Neuro: Alert and oriented x 3. No focal motor deficits. No sensory deficits. Reflexes appear intact.   Labs/data reviewedLABS: CBC with Differential:    Lab Results   Component Value Date    WBC 8.4 08/28/2021    RBC 4.59 08/28/2021    HGB 14.6 08/28/2021    HCT 43.3 08/28/2021     08/28/2021    MCV 94.3 08/28/2021    MCH 31.8 08/28/2021    MCHC 33.7 08/28/2021    RDW 12.8 08/28/2021    SEGSPCT 67 09/13/2013    LYMPHOPCT 20.4 08/28/2021    MONOPCT 7.2 08/28/2021    BASOPCT 0.5 08/28/2021    MONOSABS 0.60 08/28/2021    LYMPHSABS 1.71 08/28/2021    EOSABS 0.13 08/28/2021    BASOSABS 0.04 08/28/2021     Platelets:    Lab Results   Component Value Date     08/28/2021     CMP:    Lab Results   Component Value Date     08/26/2021    K 3.6 08/26/2021     08/26/2021    CO2 24 08/26/2021    BUN 14 08/26/2021    CREATININE 1.0 08/26/2021    GFRAA >60 08/26/2021    LABGLOM >60 08/26/2021    GLUCOSE 254 08/26/2021    PROT 6.7 08/26/2021    LABALBU 3.9 08/26/2021    CALCIUM 8.8 08/26/2021    BILITOT 0.2 08/26/2021    ALKPHOS 94 08/26/2021    AST 30 08/26/2021    ALT 37 08/26/2021     Magnesium:    Lab Results   Component Value Date    MG 2.1 12/28/2020     LDH:  No results found for: LDH  PT/INR:    Lab Results   Component Value Date    PROTIME 11.7 09/26/2020    INR 1.0 09/26/2020     Last 3 Troponin:    Lab Results   Component Value Date    TROPONINI <0.01 12/29/2020    TROPONINI <0.01 01/28/2020    TROPONINI <0.01 01/28/2020     ABG:    Lab Results   Component Value Date    PH 7.438 02/14/2018    PCO2 32.5 02/14/2018    PO2 94.2 02/14/2018    HCO3 21.5 02/14/2018    BE -1.6 02/14/2018    O2SAT 97.6 02/14/2018     IRON:  No results found for: IRON  IMAGING    CT ABDOMEN PELVIS WO CONTRAST Additional Contrast? None    Result Date: 8/27/2021  EXAMINATION: CT OF THE ABDOMEN AND PELVIS WITHOUT CONTRAST 8/27/2021 1:29 am TECHNIQUE: CT of the abdomen and pelvis was performed without the administration of intravenous contrast. Multiplanar reformatted images are provided for review. Dose modulation, iterative reconstruction, and/or weight based adjustment of the mA/kV was utilized to reduce the radiation dose to as low as reasonably achievable. COMPARISON: None.  HISTORY: ORDERING SYSTEM PROVIDED HISTORY: Abdominal pain TECHNOLOGIST PROVIDED HISTORY: Reason for exam:->Abdominal pain Additional Contrast?->None Decision Support Exception - unselect if not a suspected or confirmed emergency medical condition->Emergency Medical Condition (MA) What reading provider will be dictating this exam?->CRC FINDINGS: Lower Chest: Mild bibasilar dependent atelectasis. Coronary artery calcifications noted. Organs: Marked diffuse hepatic steatosis with focal fat sparing adjacent to the gallbladder fossa. Unremarkable spleen, pancreas, adrenals, and gallbladder on this unenhanced study. Stable left renal cysts measuring up to 1.5 cm. Stable partially rim calcified cyst in the lateral cortex of the right kidney. Marnell Jere GI/Bowel: Normal appendix. Distal colonic diverticula. No acute diverticulitis. Bowel loops nonobstructed. Pelvis: Enlarged prostate gland. Correlation with rectal examination and serum PSA levels recommended. Incompletely distended urinary bladder. Fat containing inguinal hernias bilaterally. Peritoneum/Retroperitoneum: No free air or free fluid. Vascular calcification. Stable mild fusiform dilatation of infrarenal abdominal aorta measuring 3.1 x 3.1 cm in axial plane. No adenopathy. Bones/Soft Tissues: Multilevel thoracolumbar spondylosis. Posterior disc bulges in the lower lumbar spine. Tiny bone islands in the right femoral head. Marked diffuse hepatic steatosis. Distal colonic diverticula. No acute diverticulitis. Normal appendix. Enlarged prostate. Correlation with rectal examination and serum PSA levels recommended. Stable mild fusiform aneurysmal dilatation of the infrarenal abdominal aorta. CT SOFT TISSUE NECK W CONTRAST    Result Date: 8/16/2021  EXAMINATION: CT OF THE NECK SOFT TISSUE WITH CONTRAST  8/16/2021 TECHNIQUE: CT of the neck was performed with the administration of intravenous contrast. Multiplanar reformatted images are provided for review. Dose modulation, iterative reconstruction, and/or weight based adjustment of the mA/kV was utilized to reduce the radiation dose to as low as reasonably achievable. COMPARISON: None.  HISTORY: ORDERING SYSTEM PROVIDED HISTORY: throat swelling FINDINGS: PHARYNX/LARYNX:  The palatine tonsils are normal in appearance. The tongue is normal in appearance. The valleculae, epiglottis, aryepiglottic folds and pyriform sinuses appear unremarkable. The true and false vocal cords are normal in appearance. No mass or abscess is seen. SALIVARY GLANDS/THYROID:  The parotid and submandibular glands appear unremarkable. The thyroid gland appears unremarkable. LYMPH NODES:  No cervical or supraclavicular lymphadenopathy is seen. SOFT TISSUES:  No appreciable soft tissue swelling or mass is seen. BRAIN/ORBITS/SINUSES:  The visualized portion of the intracranial contents appear unremarkable. Mucosal thickening in the ethmoid air cells and maxillary sinuses. The visualized portion of the orbits, and mastoid air cells demonstrate no acute abnormality. LUNG APICES/SUPERIOR MEDIASTINUM:  No focal consolidation is seen within the visualized lung apices. No superior mediastinal lymphadenopathy or mass. The visualized portion of the trachea appears unremarkable. BONES:  No aggressive appearing lytic or blastic bony lesion. No acute abnormality of the soft tissue structures of the neck. Mucosal thickening in the ethmoid air cells and maxillary sinuses. XR CHEST PORTABLE    Result Date: 8/27/2021  EXAMINATION: ONE XRAY VIEW OF THE CHEST 8/27/2021 12:38 am COMPARISON: None. HISTORY: ORDERING SYSTEM PROVIDED HISTORY: chest pain TECHNOLOGIST PROVIDED HISTORY: Reason for exam:->chest pain What reading provider will be dictating this exam?->CRC FINDINGS: The lungs are without acute focal process. There is no effusion or pneumothorax. The cardiomediastinal silhouette is without acute process. The osseous structures are without acute process. No acute process.      XR CHEST PORTABLE    Result Date: 8/15/2021  EXAMINATION: ONE XRAY VIEW OF THE CHEST 8/15/2021 11:14 pm COMPARISON: 12/28/2020 HISTORY: ORDERING SYSTEM PROVIDED HISTORY: cough TECHNOLOGIST PROVIDED HISTORY: Reason for exam:->cough What reading provider will be dictating this exam?->CRC FINDINGS: The lungs are without acute focal process. There is no effusion or pneumothorax. Stable cardiomegaly. The osseous structures are without acute process. No acute process. Stable cardiomegaly     CTA PULMONARY W CONTRAST    Result Date: 8/16/2021  EXAMINATION: CTA OF THE CHEST 8/16/2021 2:08 am TECHNIQUE: CTA of the chest was performed after the administration of intravenous contrast.  Multiplanar reformatted images are provided for review. MIP images are provided for review. Dose modulation, iterative reconstruction, and/or weight based adjustment of the mA/kV was utilized to reduce the radiation dose to as low as reasonably achievable. COMPARISON: Radiographs dated 08/15/2021 and CT dated 09/26/2020 HISTORY: ORDERING SYSTEM PROVIDED HISTORY: SOB, cough TECHNOLOGIST PROVIDED HISTORY: Reason for exam:->SOB, cough Decision Support Exception - unselect if not a suspected or confirmed emergency medical condition->Emergency Medical Condition (MA) What reading provider will be dictating this exam?->CRC FINDINGS: Pulmonary Arteries: Contrast bolus is heterogeneous likely related artifact from patient motion and patient's body habitus. This does limit assessment, especially of the smaller peripheral pulmonary arteries but allowing for this, no identified pulmonary embolism. Mediastinum: No evidence of mediastinal lymphadenopathy. The heart and pericardium demonstrate no acute abnormality. There is no acute abnormality of the thoracic aorta. Scattered vascular calcifications. Mild cardiomegaly. Lungs/pleura: No pneumothorax. Assessment of the lungs is somewhat limited by patient motion artifact. Trace dependent atelectasis. No significant consolidation. Upper Abdomen: Thickening of the adrenal glands, likely due to hyperplasia. Visualized portions of the upper abdomen demonstrate no acute abnormality. Soft Tissues/Bones: Degenerative changes are scattered in the spine. Exam somewhat limited by patient motion. No identified pulmonary embolism. Trace dependent atelectasis but no acute process otherwise. DIET:  Diet NPO Exceptions are: Sips of Water with Meds    Medications:    Scheduled Meds:   amLODIPine  10 mg Oral Daily    atorvastatin  40 mg Oral Daily    losartan  100 mg Oral Nightly    ticagrelor  90 mg Oral BID    traZODone  100 mg Oral Nightly    sodium chloride flush  5-40 mL IntraVENous 2 times per day    enoxaparin  40 mg Subcutaneous Daily    famotidine  40 mg Oral Nightly    budesonide  0.5 mg Nebulization BID    bumetanide  2 mg Oral Daily    ipratropium-albuterol  1 ampule Inhalation 4x daily    metoprolol succinate  100 mg Oral BID    spironolactone  25 mg Oral Daily    hydrALAZINE  25 mg Oral 3 times per day       Continuous Infusions:   sodium chloride         PRN Meds:albuterol, sodium chloride flush, sodium chloride, ondansetron **OR** ondansetron, polyethylene glycol, acetaminophen **OR** acetaminophen, HYDROcodone 5 mg - acetaminophen, hydrALAZINE    A/P:      Patient Active Problem List   Diagnosis    Obstructive sleep apnea    Chronic kidney disease, stage II (mild)    Pure hypercholesterolemia    HTN (hypertension)    (HFpEF) heart failure with preserved ejection fraction (HCC)    TIA (transient ischemic attack)    Tobacco abuse    Coronary artery disease involving native coronary artery of native heart without angina pectoris    Chest pain    PLAN:  Patient left AMA  I would suggest outpatient stress test, renal Artery ultrasound  I would add hydralazine, Aldactone and increase Toprol  He needs PSA done given CAT scan finding  A1c came back today 8.2%. He needs glucometer. And treated for diabetes  I question if he is an alcoholic?   He had agreed to stay for testing but reneged  He may need a new CPAP machine, given what he told me  He understood the risk of his blood pressure and heart rate being elevated and causing cardiac problems  Patient may need to go to the CHF clinic    I can be reached though [facility] or [facility] [place] at [phone]

## 2021-10-26 ENCOUNTER — APPOINTMENT (OUTPATIENT)
Dept: CT IMAGING | Age: 56
DRG: 247 | End: 2021-10-26
Payer: MEDICARE

## 2021-10-26 ENCOUNTER — APPOINTMENT (OUTPATIENT)
Dept: GENERAL RADIOLOGY | Age: 56
DRG: 247 | End: 2021-10-26
Payer: MEDICARE

## 2021-10-26 ENCOUNTER — HOSPITAL ENCOUNTER (INPATIENT)
Age: 56
LOS: 2 days | Discharge: HOME | DRG: 247 | End: 2021-10-28
Attending: EMERGENCY MEDICINE | Admitting: INTERNAL MEDICINE
Payer: MEDICARE

## 2021-10-26 DIAGNOSIS — I16.1 HYPERTENSIVE EMERGENCY: ICD-10-CM

## 2021-10-26 DIAGNOSIS — R07.9 CHEST PAIN, UNSPECIFIED TYPE: Primary | ICD-10-CM

## 2021-10-26 PROBLEM — I21.4 NSTEMI (NON-ST ELEVATED MYOCARDIAL INFARCTION) (HCC): Status: ACTIVE | Noted: 2021-10-26

## 2021-10-26 LAB
ALBUMIN SERPL-MCNC: 4.3 G/DL (ref 3.5–5.2)
ALP BLD-CCNC: 125 U/L (ref 40–129)
ALT SERPL-CCNC: 36 U/L (ref 0–40)
ANION GAP SERPL CALCULATED.3IONS-SCNC: 17 MMOL/L (ref 7–16)
APTT: 65.1 SEC (ref 24.5–35.1)
AST SERPL-CCNC: 37 U/L (ref 0–39)
BASOPHILS ABSOLUTE: 0.03 E9/L (ref 0–0.2)
BASOPHILS RELATIVE PERCENT: 0.4 % (ref 0–2)
BILIRUB SERPL-MCNC: 0.5 MG/DL (ref 0–1.2)
BUN BLDV-MCNC: 17 MG/DL (ref 6–20)
CALCIUM SERPL-MCNC: 9.9 MG/DL (ref 8.6–10.2)
CHLORIDE BLD-SCNC: 100 MMOL/L (ref 98–107)
CO2: 22 MMOL/L (ref 22–29)
CREAT SERPL-MCNC: 0.9 MG/DL (ref 0.7–1.2)
EKG ATRIAL RATE: 76 BPM
EKG P AXIS: 26 DEGREES
EKG P-R INTERVAL: 174 MS
EKG Q-T INTERVAL: 400 MS
EKG QRS DURATION: 100 MS
EKG QTC CALCULATION (BAZETT): 450 MS
EKG R AXIS: -37 DEGREES
EKG T AXIS: 139 DEGREES
EKG VENTRICULAR RATE: 76 BPM
EOSINOPHILS ABSOLUTE: 0.08 E9/L (ref 0.05–0.5)
EOSINOPHILS RELATIVE PERCENT: 1 % (ref 0–6)
GFR AFRICAN AMERICAN: >60
GFR NON-AFRICAN AMERICAN: >60 ML/MIN/1.73
GLUCOSE BLD-MCNC: 284 MG/DL (ref 74–99)
HCT VFR BLD CALC: 43.3 % (ref 37–54)
HCT VFR BLD CALC: 44.9 % (ref 37–54)
HEMOGLOBIN: 14.6 G/DL (ref 12.5–16.5)
HEMOGLOBIN: 15.3 G/DL (ref 12.5–16.5)
IMMATURE GRANULOCYTES #: 0.07 E9/L
IMMATURE GRANULOCYTES %: 0.9 % (ref 0–5)
INR BLD: 1
LIPASE: 53 U/L (ref 13–60)
LYMPHOCYTES ABSOLUTE: 1.24 E9/L (ref 1.5–4)
LYMPHOCYTES RELATIVE PERCENT: 15.3 % (ref 20–42)
MCH RBC QN AUTO: 31.2 PG (ref 26–35)
MCH RBC QN AUTO: 31.9 PG (ref 26–35)
MCHC RBC AUTO-ENTMCNC: 33.7 % (ref 32–34.5)
MCHC RBC AUTO-ENTMCNC: 34.1 % (ref 32–34.5)
MCV RBC AUTO: 91.6 FL (ref 80–99.9)
MCV RBC AUTO: 94.7 FL (ref 80–99.9)
METER GLUCOSE: 207 MG/DL (ref 74–99)
MONOCYTES ABSOLUTE: 0.55 E9/L (ref 0.1–0.95)
MONOCYTES RELATIVE PERCENT: 6.8 % (ref 2–12)
NEUTROPHILS ABSOLUTE: 6.14 E9/L (ref 1.8–7.3)
NEUTROPHILS RELATIVE PERCENT: 75.6 % (ref 43–80)
PDW BLD-RTO: 12.8 FL (ref 11.5–15)
PDW BLD-RTO: 13.1 FL (ref 11.5–15)
PLATELET # BLD: 213 E9/L (ref 130–450)
PLATELET # BLD: 213 E9/L (ref 130–450)
PMV BLD AUTO: 9.3 FL (ref 7–12)
PMV BLD AUTO: 9.6 FL (ref 7–12)
POTASSIUM SERPL-SCNC: 3.7 MMOL/L (ref 3.5–5)
PRO-BNP: 675 PG/ML (ref 0–125)
PROTHROMBIN TIME: 10.8 SEC (ref 9.3–12.4)
RBC # BLD: 4.57 E12/L (ref 3.8–5.8)
RBC # BLD: 4.9 E12/L (ref 3.8–5.8)
REASON FOR REJECTION: NORMAL
REJECTED TEST: NORMAL
SARS-COV-2, NAAT: NOT DETECTED
SODIUM BLD-SCNC: 139 MMOL/L (ref 132–146)
TOTAL PROTEIN: 7.6 G/DL (ref 6.4–8.3)
TROPONIN, HIGH SENSITIVITY: 264 NG/L (ref 0–11)
TROPONIN, HIGH SENSITIVITY: 92 NG/L (ref 0–11)
WBC # BLD: 10.6 E9/L (ref 4.5–11.5)
WBC # BLD: 8.1 E9/L (ref 4.5–11.5)

## 2021-10-26 PROCEDURE — 96368 THER/DIAG CONCURRENT INF: CPT

## 2021-10-26 PROCEDURE — 6360000004 HC RX CONTRAST MEDICATION: Performed by: RADIOLOGY

## 2021-10-26 PROCEDURE — 71275 CT ANGIOGRAPHY CHEST: CPT

## 2021-10-26 PROCEDURE — 99222 1ST HOSP IP/OBS MODERATE 55: CPT | Performed by: INTERNAL MEDICINE

## 2021-10-26 PROCEDURE — 85730 THROMBOPLASTIN TIME PARTIAL: CPT

## 2021-10-26 PROCEDURE — 6370000000 HC RX 637 (ALT 250 FOR IP): Performed by: INTERNAL MEDICINE

## 2021-10-26 PROCEDURE — 96376 TX/PRO/DX INJ SAME DRUG ADON: CPT

## 2021-10-26 PROCEDURE — 96375 TX/PRO/DX INJ NEW DRUG ADDON: CPT

## 2021-10-26 PROCEDURE — 83690 ASSAY OF LIPASE: CPT

## 2021-10-26 PROCEDURE — 2580000003 HC RX 258: Performed by: EMERGENCY MEDICINE

## 2021-10-26 PROCEDURE — 93005 ELECTROCARDIOGRAM TRACING: CPT | Performed by: INTERNAL MEDICINE

## 2021-10-26 PROCEDURE — 2580000003 HC RX 258: Performed by: INTERNAL MEDICINE

## 2021-10-26 PROCEDURE — 92928 PRQ TCAT PLMT NTRAC ST 1 LES: CPT | Performed by: INTERNAL MEDICINE

## 2021-10-26 PROCEDURE — 96366 THER/PROPH/DIAG IV INF ADDON: CPT

## 2021-10-26 PROCEDURE — 6360000002 HC RX W HCPCS: Performed by: EMERGENCY MEDICINE

## 2021-10-26 PROCEDURE — 6370000000 HC RX 637 (ALT 250 FOR IP): Performed by: EMERGENCY MEDICINE

## 2021-10-26 PROCEDURE — 6360000002 HC RX W HCPCS: Performed by: INTERNAL MEDICINE

## 2021-10-26 PROCEDURE — APPSS180 APP SPLIT SHARED TIME > 60 MINUTES: Performed by: NURSE PRACTITIONER

## 2021-10-26 PROCEDURE — 80053 COMPREHEN METABOLIC PANEL: CPT

## 2021-10-26 PROCEDURE — 85025 COMPLETE CBC W/AUTO DIFF WBC: CPT

## 2021-10-26 PROCEDURE — 99285 EMERGENCY DEPT VISIT HI MDM: CPT

## 2021-10-26 PROCEDURE — 96365 THER/PROPH/DIAG IV INF INIT: CPT

## 2021-10-26 PROCEDURE — 2500000003 HC RX 250 WO HCPCS: Performed by: EMERGENCY MEDICINE

## 2021-10-26 PROCEDURE — 84484 ASSAY OF TROPONIN QUANT: CPT

## 2021-10-26 PROCEDURE — 36415 COLL VENOUS BLD VENIPUNCTURE: CPT

## 2021-10-26 PROCEDURE — 2500000003 HC RX 250 WO HCPCS: Performed by: INTERNAL MEDICINE

## 2021-10-26 PROCEDURE — 85610 PROTHROMBIN TIME: CPT

## 2021-10-26 PROCEDURE — 94660 CPAP INITIATION&MGMT: CPT

## 2021-10-26 PROCEDURE — 93010 ELECTROCARDIOGRAM REPORT: CPT | Performed by: INTERNAL MEDICINE

## 2021-10-26 PROCEDURE — 82962 GLUCOSE BLOOD TEST: CPT

## 2021-10-26 PROCEDURE — 6360000002 HC RX W HCPCS

## 2021-10-26 PROCEDURE — 71045 X-RAY EXAM CHEST 1 VIEW: CPT

## 2021-10-26 PROCEDURE — 83880 ASSAY OF NATRIURETIC PEPTIDE: CPT

## 2021-10-26 PROCEDURE — 85027 COMPLETE CBC AUTOMATED: CPT

## 2021-10-26 PROCEDURE — 87635 SARS-COV-2 COVID-19 AMP PRB: CPT

## 2021-10-26 PROCEDURE — 96374 THER/PROPH/DIAG INJ IV PUSH: CPT

## 2021-10-26 PROCEDURE — 2140000000 HC CCU INTERMEDIATE R&B

## 2021-10-26 RX ORDER — BUDESONIDE 0.5 MG/2ML
0.5 INHALANT ORAL 2 TIMES DAILY
Status: DISCONTINUED | OUTPATIENT
Start: 2021-10-26 | End: 2021-10-28 | Stop reason: HOSPADM

## 2021-10-26 RX ORDER — HYDRALAZINE HYDROCHLORIDE 20 MG/ML
INJECTION INTRAMUSCULAR; INTRAVENOUS
Status: COMPLETED
Start: 2021-10-26 | End: 2021-10-26

## 2021-10-26 RX ORDER — HEPARIN SODIUM 1000 [USP'U]/ML
10000 INJECTION, SOLUTION INTRAVENOUS; SUBCUTANEOUS PRN
Status: DISCONTINUED | OUTPATIENT
Start: 2021-10-26 | End: 2021-10-27

## 2021-10-26 RX ORDER — ACETAMINOPHEN 650 MG/1
650 SUPPOSITORY RECTAL EVERY 6 HOURS PRN
Status: DISCONTINUED | OUTPATIENT
Start: 2021-10-26 | End: 2021-10-28 | Stop reason: HOSPADM

## 2021-10-26 RX ORDER — DEXTROSE MONOHYDRATE 50 MG/ML
100 INJECTION, SOLUTION INTRAVENOUS PRN
Status: DISCONTINUED | OUTPATIENT
Start: 2021-10-26 | End: 2021-10-28 | Stop reason: HOSPADM

## 2021-10-26 RX ORDER — NICOTINE POLACRILEX 4 MG
15 LOZENGE BUCCAL PRN
Status: DISCONTINUED | OUTPATIENT
Start: 2021-10-26 | End: 2021-10-28 | Stop reason: HOSPADM

## 2021-10-26 RX ORDER — ALBUTEROL SULFATE 2.5 MG/3ML
2.5 SOLUTION RESPIRATORY (INHALATION) 4 TIMES DAILY PRN
Status: DISCONTINUED | OUTPATIENT
Start: 2021-10-26 | End: 2021-10-28 | Stop reason: HOSPADM

## 2021-10-26 RX ORDER — TRAZODONE HYDROCHLORIDE 50 MG/1
50 TABLET ORAL 2 TIMES DAILY
Status: DISCONTINUED | OUTPATIENT
Start: 2021-10-26 | End: 2021-10-28 | Stop reason: HOSPADM

## 2021-10-26 RX ORDER — HEPARIN SODIUM 10000 [USP'U]/100ML
5-30 INJECTION, SOLUTION INTRAVENOUS CONTINUOUS
Status: DISCONTINUED | OUTPATIENT
Start: 2021-10-26 | End: 2021-10-27

## 2021-10-26 RX ORDER — HEPARIN SODIUM 1000 [USP'U]/ML
5000 INJECTION, SOLUTION INTRAVENOUS; SUBCUTANEOUS PRN
Status: DISCONTINUED | OUTPATIENT
Start: 2021-10-26 | End: 2021-10-27

## 2021-10-26 RX ORDER — NITROGLYCERIN 20 MG/100ML
5-200 INJECTION INTRAVENOUS CONTINUOUS
Status: DISCONTINUED | OUTPATIENT
Start: 2021-10-26 | End: 2021-10-27

## 2021-10-26 RX ORDER — SODIUM CHLORIDE 0.9 % (FLUSH) 0.9 %
5-40 SYRINGE (ML) INJECTION EVERY 12 HOURS SCHEDULED
Status: DISCONTINUED | OUTPATIENT
Start: 2021-10-26 | End: 2021-10-27

## 2021-10-26 RX ORDER — BUDESONIDE AND FORMOTEROL FUMARATE DIHYDRATE 160; 4.5 UG/1; UG/1
2 AEROSOL RESPIRATORY (INHALATION) 2 TIMES DAILY
Status: DISCONTINUED | OUTPATIENT
Start: 2021-10-26 | End: 2021-10-26 | Stop reason: CLARIF

## 2021-10-26 RX ORDER — HEPARIN SODIUM 10000 [USP'U]/100ML
5-30 INJECTION, SOLUTION INTRAVENOUS CONTINUOUS
Status: CANCELLED | OUTPATIENT
Start: 2021-10-26

## 2021-10-26 RX ORDER — HEPARIN SODIUM 1000 [USP'U]/ML
60 INJECTION, SOLUTION INTRAVENOUS; SUBCUTANEOUS ONCE
Status: CANCELLED | OUTPATIENT
Start: 2021-10-26 | End: 2021-10-26

## 2021-10-26 RX ORDER — ACETAMINOPHEN 325 MG/1
650 TABLET ORAL EVERY 6 HOURS PRN
Status: DISCONTINUED | OUTPATIENT
Start: 2021-10-26 | End: 2021-10-28 | Stop reason: HOSPADM

## 2021-10-26 RX ORDER — SODIUM CHLORIDE 9 MG/ML
25 INJECTION, SOLUTION INTRAVENOUS PRN
Status: DISCONTINUED | OUTPATIENT
Start: 2021-10-26 | End: 2021-10-27

## 2021-10-26 RX ORDER — FENTANYL CITRATE 50 UG/ML
50 INJECTION, SOLUTION INTRAMUSCULAR; INTRAVENOUS ONCE
Status: COMPLETED | OUTPATIENT
Start: 2021-10-26 | End: 2021-10-26

## 2021-10-26 RX ORDER — ARFORMOTEROL TARTRATE 15 UG/2ML
15 SOLUTION RESPIRATORY (INHALATION) 2 TIMES DAILY
Status: DISCONTINUED | OUTPATIENT
Start: 2021-10-26 | End: 2021-10-28 | Stop reason: HOSPADM

## 2021-10-26 RX ORDER — NITROGLYCERIN 20 MG/100ML
5-200 INJECTION INTRAVENOUS CONTINUOUS
Status: CANCELLED | OUTPATIENT
Start: 2021-10-26

## 2021-10-26 RX ORDER — FUROSEMIDE 40 MG/1
40 TABLET ORAL DAILY
Status: DISCONTINUED | OUTPATIENT
Start: 2021-10-26 | End: 2021-10-28 | Stop reason: HOSPADM

## 2021-10-26 RX ORDER — AMLODIPINE BESYLATE 10 MG/1
10 TABLET ORAL DAILY
Status: DISCONTINUED | OUTPATIENT
Start: 2021-10-26 | End: 2021-10-28

## 2021-10-26 RX ORDER — DEXTROSE MONOHYDRATE 25 G/50ML
12.5 INJECTION, SOLUTION INTRAVENOUS PRN
Status: DISCONTINUED | OUTPATIENT
Start: 2021-10-26 | End: 2021-10-28 | Stop reason: HOSPADM

## 2021-10-26 RX ORDER — HEPARIN SODIUM 1000 [USP'U]/ML
60 INJECTION, SOLUTION INTRAVENOUS; SUBCUTANEOUS PRN
Status: CANCELLED | OUTPATIENT
Start: 2021-10-26

## 2021-10-26 RX ORDER — IBUPROFEN 400 MG/1
400 TABLET ORAL EVERY 6 HOURS PRN
Status: ON HOLD | COMMUNITY
End: 2021-10-28 | Stop reason: HOSPADM

## 2021-10-26 RX ORDER — ONDANSETRON 2 MG/ML
4 INJECTION INTRAMUSCULAR; INTRAVENOUS EVERY 6 HOURS PRN
Status: DISCONTINUED | OUTPATIENT
Start: 2021-10-26 | End: 2021-10-28 | Stop reason: HOSPADM

## 2021-10-26 RX ORDER — LABETALOL HYDROCHLORIDE 5 MG/ML
10 INJECTION, SOLUTION INTRAVENOUS ONCE
Status: COMPLETED | OUTPATIENT
Start: 2021-10-26 | End: 2021-10-26

## 2021-10-26 RX ORDER — METOPROLOL SUCCINATE 50 MG/1
50 TABLET, EXTENDED RELEASE ORAL 2 TIMES DAILY
Status: DISCONTINUED | OUTPATIENT
Start: 2021-10-26 | End: 2021-10-28 | Stop reason: HOSPADM

## 2021-10-26 RX ORDER — HEPARIN SODIUM 1000 [USP'U]/ML
10000 INJECTION, SOLUTION INTRAVENOUS; SUBCUTANEOUS ONCE
Status: COMPLETED | OUTPATIENT
Start: 2021-10-26 | End: 2021-10-26

## 2021-10-26 RX ORDER — HEPARIN SODIUM 1000 [USP'U]/ML
30 INJECTION, SOLUTION INTRAVENOUS; SUBCUTANEOUS PRN
Status: CANCELLED | OUTPATIENT
Start: 2021-10-26

## 2021-10-26 RX ORDER — SODIUM CHLORIDE 0.9 % (FLUSH) 0.9 %
5-40 SYRINGE (ML) INJECTION PRN
Status: DISCONTINUED | OUTPATIENT
Start: 2021-10-26 | End: 2021-10-27

## 2021-10-26 RX ORDER — 0.9 % SODIUM CHLORIDE 0.9 %
1000 INTRAVENOUS SOLUTION INTRAVENOUS ONCE
Status: COMPLETED | OUTPATIENT
Start: 2021-10-26 | End: 2021-10-26

## 2021-10-26 RX ORDER — POLYETHYLENE GLYCOL 3350 17 G/17G
17 POWDER, FOR SOLUTION ORAL DAILY PRN
Status: DISCONTINUED | OUTPATIENT
Start: 2021-10-26 | End: 2021-10-28 | Stop reason: HOSPADM

## 2021-10-26 RX ORDER — ONDANSETRON 4 MG/1
4 TABLET, ORALLY DISINTEGRATING ORAL EVERY 8 HOURS PRN
Status: DISCONTINUED | OUTPATIENT
Start: 2021-10-26 | End: 2021-10-28 | Stop reason: HOSPADM

## 2021-10-26 RX ADMIN — SODIUM CHLORIDE, PRESERVATIVE FREE 10 ML: 5 INJECTION INTRAVENOUS at 21:30

## 2021-10-26 RX ADMIN — TRAZODONE HYDROCHLORIDE 50 MG: 50 TABLET ORAL at 21:52

## 2021-10-26 RX ADMIN — ONDANSETRON 4 MG: 2 INJECTION INTRAMUSCULAR; INTRAVENOUS at 23:52

## 2021-10-26 RX ADMIN — FENTANYL CITRATE 50 MCG: 0.05 INJECTION, SOLUTION INTRAMUSCULAR; INTRAVENOUS at 13:38

## 2021-10-26 RX ADMIN — IOPAMIDOL 80 ML: 755 INJECTION, SOLUTION INTRAVENOUS at 16:40

## 2021-10-26 RX ADMIN — SODIUM CHLORIDE 1000 ML: 9 INJECTION, SOLUTION INTRAVENOUS at 17:53

## 2021-10-26 RX ADMIN — HEPARIN SODIUM 6 UNITS/KG/HR: 10000 INJECTION, SOLUTION INTRAVENOUS at 17:17

## 2021-10-26 RX ADMIN — AMLODIPINE BESYLATE 10 MG: 10 TABLET ORAL at 21:30

## 2021-10-26 RX ADMIN — METOPROLOL SUCCINATE 50 MG: 50 TABLET, EXTENDED RELEASE ORAL at 21:30

## 2021-10-26 RX ADMIN — INSULIN LISPRO 1 UNITS: 100 INJECTION, SOLUTION INTRAVENOUS; SUBCUTANEOUS at 21:26

## 2021-10-26 RX ADMIN — INSULIN LISPRO 2 UNITS: 100 INJECTION, SOLUTION INTRAVENOUS; SUBCUTANEOUS at 21:27

## 2021-10-26 RX ADMIN — ACETAMINOPHEN 650 MG: 325 TABLET ORAL at 21:30

## 2021-10-26 RX ADMIN — LIDOCAINE HYDROCHLORIDE: 20 SOLUTION ORAL; TOPICAL at 11:53

## 2021-10-26 RX ADMIN — HEPARIN SODIUM 10000 UNITS: 1000 INJECTION INTRAVENOUS; SUBCUTANEOUS at 17:09

## 2021-10-26 RX ADMIN — FENTANYL CITRATE 50 MCG: 0.05 INJECTION, SOLUTION INTRAMUSCULAR; INTRAVENOUS at 12:16

## 2021-10-26 RX ADMIN — NITROGLYCERIN 1 INCH: 20 OINTMENT TOPICAL at 11:51

## 2021-10-26 RX ADMIN — HYDRALAZINE HYDROCHLORIDE 20 MG: 20 INJECTION INTRAMUSCULAR; INTRAVENOUS at 11:44

## 2021-10-26 RX ADMIN — LABETALOL HYDROCHLORIDE 10 MG: 5 INJECTION INTRAVENOUS at 12:10

## 2021-10-26 RX ADMIN — FUROSEMIDE 40 MG: 40 TABLET ORAL at 21:30

## 2021-10-26 RX ADMIN — NITROGLYCERIN 5 MCG/MIN: 20 INJECTION INTRAVENOUS at 17:06

## 2021-10-26 RX ADMIN — IOPAMIDOL 75 ML: 755 INJECTION, SOLUTION INTRAVENOUS at 15:48

## 2021-10-26 ASSESSMENT — PAIN DESCRIPTION - FREQUENCY
FREQUENCY: INTERMITTENT
FREQUENCY: CONTINUOUS
FREQUENCY: CONTINUOUS

## 2021-10-26 ASSESSMENT — PAIN SCALES - GENERAL
PAINLEVEL_OUTOF10: 5
PAINLEVEL_OUTOF10: 5
PAINLEVEL_OUTOF10: 10
PAINLEVEL_OUTOF10: 5
PAINLEVEL_OUTOF10: 9

## 2021-10-26 ASSESSMENT — PAIN DESCRIPTION - DESCRIPTORS
DESCRIPTORS: ACHING;SHOOTING;SHARP;DISCOMFORT
DESCRIPTORS: ACHING
DESCRIPTORS: SHARP;PRESSURE;HEAVINESS

## 2021-10-26 ASSESSMENT — PAIN DESCRIPTION - ONSET
ONSET: ON-GOING
ONSET: GRADUAL
ONSET: ON-GOING

## 2021-10-26 ASSESSMENT — PAIN DESCRIPTION - LOCATION
LOCATION: CHEST
LOCATION: CHEST
LOCATION: HEAD
LOCATION: CHEST

## 2021-10-26 ASSESSMENT — PAIN DESCRIPTION - ORIENTATION: ORIENTATION: MID

## 2021-10-26 ASSESSMENT — PAIN - FUNCTIONAL ASSESSMENT
PAIN_FUNCTIONAL_ASSESSMENT: PREVENTS OR INTERFERES SOME ACTIVE ACTIVITIES AND ADLS
PAIN_FUNCTIONAL_ASSESSMENT: PREVENTS OR INTERFERES SOME ACTIVE ACTIVITIES AND ADLS

## 2021-10-26 ASSESSMENT — PAIN DESCRIPTION - PAIN TYPE
TYPE: ACUTE PAIN
TYPE: ACUTE PAIN

## 2021-10-26 ASSESSMENT — PAIN DESCRIPTION - DIRECTION: RADIATING_TOWARDS: L ARM

## 2021-10-26 ASSESSMENT — PAIN DESCRIPTION - PROGRESSION
CLINICAL_PROGRESSION: NOT CHANGED
CLINICAL_PROGRESSION: NOT CHANGED

## 2021-10-26 NOTE — ED NOTES
HPI:  10/26/21, Time: 12:27 PM EDT         Skyler Moon is a 49 y.o. male with PMHx of HTN, T2DM, CAD s/p MOSES stenting x2 in 2019, HLD, BPH, fatty liver infiltration, prior NSTEMI, COPD, 3.1 x 3.1 cm infrarenal abdominal aortic aneurysm (last visualized 8/27/2010), and JARRET, who is presenting to the ED for chest pain, beginning approximately 10:00 this a.m. The complaint has been constant, severe in severity. The patient states that the pain is sharp and shooting, beginning in the midsternal region and radiating straight through to his back and left arm. He also endorses nausea and diaphoresis, but no vomiting. He states that he has been unable to take any of his morning meds D/T pain today. ROS:   Unless otherwise stated in this report or unable to obtain because of the patient's clinical or mental status as evidenced by the medical record, this patients's positive and negative responses for Review of Systems, constitutional, psych, eyes, ENT, cardiovascular, respiratory, gastrointestinal, neurological, genitourinary, musculoskeletal, integument systems and systems related to the presenting problem are either stated in the preceding or were not pertinent or were negative for the symptoms and/or complaints related to the medical problem. --------------------------------------------- PAST HISTORY ---------------------------------------------  Past Medical History:  has a past medical history of Accelerated hypertension, Arthritis, CAD (coronary artery disease), Cerebral artery occlusion with cerebral infarction ([facility] [address] CHF (congestive heart failure) ([facility] [address] [facility] obstructive [facility] Depression with anxiety, Fibromyalgia, GI bleeding, Hyperlipidemia, Hypertension, Myocardial infarct [facility]), Sleep apnea, and Urinary frequency. Past Surgical History:  has a past surgical history that includes Tonsillectomy; Colonoscopy (08/18/2017);  Endoscopy, colon, diagnostic (08/18/2017); -------------------------------------------------  I have personally reviewed all laboratory and imaging results for this patient. Results are listed below.      LABS:  Results for orders placed or performed during the hospital encounter of 10/26/21   COVID-19, Rapid    Specimen: Nasopharyngeal Swab   Result Value Ref Range    SARS-CoV-2, NAAT Not Detected Not Detected   Troponin   Result Value Ref Range    Troponin, High Sensitivity 92 (H) 0 - 11 ng/L   Protime-INR   Result Value Ref Range    Protime 10.8 9.3 - 12.4 sec    INR 1.0    CBC WITH AUTO DIFFERENTIAL   Result Value Ref Range    WBC 8.1 4.5 - 11.5 E9/L    RBC 4.90 3.80 - 5.80 E12/L    Hemoglobin 15.3 12.5 - 16.5 g/dL    Hematocrit 44.9 37.0 - 54.0 %    MCV 91.6 80.0 - 99.9 fL    MCH 31.2 26.0 - 35.0 pg    MCHC 34.1 32.0 - 34.5 %    RDW 12.8 11.5 - 15.0 fL    Platelets 620 501 - 528 E9/L    MPV 9.6 7.0 - 12.0 fL    Neutrophils % 75.6 43.0 - 80.0 %    Immature Granulocytes % 0.9 0.0 - 5.0 %    Lymphocytes % 15.3 (L) 20.0 - 42.0 %    Monocytes % 6.8 2.0 - 12.0 %    Eosinophils % 1.0 0.0 - 6.0 %    Basophils % 0.4 0.0 - 2.0 %    Neutrophils Absolute 6.14 1.80 - 7.30 E9/L    Immature Granulocytes # 0.07 E9/L    Lymphocytes Absolute 1.24 (L) 1.50 - 4.00 E9/L    Monocytes Absolute 0.55 0.10 - 0.95 E9/L    Eosinophils Absolute 0.08 0.05 - 0.50 E9/L    Basophils Absolute 0.03 0.00 - 0.20 E9/L   COMPREHENSIVE METABOLIC PANEL   Result Value Ref Range    Sodium 139 132 - 146 mmol/L    Potassium 3.7 3.5 - 5.0 mmol/L    Chloride 100 98 - 107 mmol/L    CO2 22 22 - 29 mmol/L    Anion Gap 17 (H) 7 - 16 mmol/L    Glucose 284 (H) 74 - 99 mg/dL    BUN 17 6 - 20 mg/dL    CREATININE 0.9 0.7 - 1.2 mg/dL    GFR Non-African American >60 >=60 mL/min/1.73    GFR African American >60     Calcium 9.9 8.6 - 10.2 mg/dL    Total Protein 7.6 6.4 - 8.3 g/dL    Albumin 4.3 3.5 - 5.2 g/dL    Total Bilirubin 0.5 0.0 - 1.2 mg/dL    Alkaline Phosphatase 125 40 - 129 U/L    ALT 36 0 - 40 U/L AST 37 0 - 39 U/L   Brain Natriuretic Peptide   Result Value Ref Range    Pro- (H) 0 - 125 pg/mL   Lipase   Result Value Ref Range    Lipase 53 13 - 60 U/L   SPECIMEN REJECTION   Result Value Ref Range    Rejected Test TROP     Reason for Rejection see below    Troponin   Result Value Ref Range    Troponin, High Sensitivity 264 (H) 0 - 11 ng/L   EKG 12 Lead   Result Value Ref Range    Ventricular Rate 76 BPM    Atrial Rate 76 BPM    P-R Interval 174 ms    QRS Duration 100 ms    Q-T Interval 400 ms    QTc Calculation (Bazett) 450 ms    P Axis 26 degrees    R Axis -37 degrees    T Axis 139 degrees     RADIOLOGY:  Interpreted by Radiologist.  XR CHEST PORTABLE   Final Result   Limited portable chest x-ray however the appearance of the chest is   suspicious for multifocal bilateral ground-glass infiltrates. CT of the   chest is recommended. CTA PULMONARY W CONTRAST    (Results Pending)     EKG Interpretation  Interpreted by emergency department physician    Rhythm: normal sinus   Rate: normal  Axis: left  Conduction: left anterior fasciclar block  ST Segments: depression in  v2-v6,   T Waves: inversion in  v4, v5, v6, I and aVl  Comparison to prior EKG: no acute changes when compared to patient's most recent prior EKG     ------------------------- NURSING NOTES AND VITALS REVIEWED ---------------------------   The nursing notes within the ED encounter and vital signs as below have been reviewed by myself. BP (!) 149/103   Pulse 70   Temp 96.6 °F (35.9 °C) (Infrared)   Resp 14   Ht 6' (1.829 m)   Wt (!) 382 lb (173.3 kg)   SpO2 96%   BMI 51.81 kg/m²   Oxygen Saturation Interpretation: Normal    The patients available past medical records and past encounters were reviewed.       ------------------------------ ED COURSE/MEDICAL DECISION MAKING----------------------  Medications   aspirin EC tablet 325 mg (325 mg Oral Not Given 10/26/21 1145)   nitroglycerin (NITRO-BID) 2 % ointment 1 inch (has no administration in time range)   heparin (porcine) injection 10,000 Units (has no administration in time range)   heparin (porcine) injection 10,000 Units (has no administration in time range)   heparin (porcine) injection 5,000 Units (has no administration in time range)   heparin 25,000 units in dextrose 5% 250 mL (premix) infusion (has no administration in time range)   nitroGLYCERIN 50 mg in dextrose 5% 250 mL infusion (has no administration in time range)   hydrALAZINE (APRESOLINE) 20 MG/ML injection (20 mg  Given 10/26/21 1144)   aluminum & magnesium hydroxide-simethicone (MAALOX) 30 mL, lidocaine viscous hcl (XYLOCAINE) 5 mL (GI COCKTAIL) ( Oral Given 10/26/21 1153)   labetalol (NORMODYNE;TRANDATE) injection 10 mg (10 mg IntraVENous Given 10/26/21 1210)   fentaNYL (SUBLIMAZE) injection 50 mcg (50 mcg IntraVENous Given 10/26/21 1216)   fentaNYL (SUBLIMAZE) injection 50 mcg (50 mcg IntraVENous Given 10/26/21 1338)   iopamidol (ISOVUE-370) 76 % injection 75 mL (75 mLs IntraVENous Given 10/26/21 1548)     Medical Decision Making:    Leonidas Soto is a [id number] male with a PMHx of HTN, T2DM, CAD s/p MOSES stenting x2 in 2019, HLD, BPH, fatty liver infiltration, prior NSTEMI, COPD, 3.1 x 3.1 cm infrarenal abdominal aortic aneurysm (last visualized 8/27/2010), who presented to the ED on 10/26/2021 for chest pain which woke him from sleep at approximately 1000 this AM.  The patient describes the pain as sharp, midsternal, with radiation into the left arm. He was initially in hypertensive emergency, with a BP >200/100mmHg. EKG performed was concern for ischemic changes, namely ST segment depressions in leads V2 through V6 and T wave inversions in V4 - V6 and 1-aVL. The patient was given 325 mg of aspirin, and hydralazine and labetalol were given for BP control. IV fentanyl was given for pain control as well. He was also started on heparin and nitroglycerin gtt.  Upon BP reduction, patient's repeat EKG showed some improvement in ST-T wave changes. CTA was performed to R/O PE. Cardiology was consulted-per their recommendation, the patient will not be taken to Cath Lab. He is to be admitted for observation and further medical management/stabilization. Re-Evaluations:             Re-evaluation. Patients symptoms show no change      Consultations:             Cardiology    This patient's ED course included: a personal history and physicial examination, multiple bedside re-evaluations, IV medications, cardiac monitoring and complex medical decision making and emergency management    This patient has been closely monitored during their ED course. Counseling: The emergency provider has spoken with the patient and discussed todays results, in addition to providing specific details for the plan of care and counseling regarding the diagnosis and prognosis. Questions are answered at this time and they are agreeable with the plan.       --------------------------------- IMPRESSION AND DISPOSITION ---------------------------------    IMPRESSION  1. Chest pain, unspecified type    2. Hypertensive emergency        DISPOSITION  Disposition: Admit to telemetry  Patient condition is stable    NOTE: This report was transcribed using voice recognition software.  Every effort was made to ensure accuracy; however, inadvertent computerized transcription errors may be present           DO Jessee Fabian  10/26/21 0619

## 2021-10-26 NOTE — ED NOTES
Pt states pain had now traveled to his right shoulder.   Awaiting CT     Aileen Guerrero RN  10/26/21 6209

## 2021-10-26 NOTE — CONSULTS
Inpatient Cardiology Consultation      Reason for Consult:  Chest Pain and ST Depressions    Consulting Physician: Dr. Nelson Rater     Requesting Physician:  Dr. Kimber Preston    Date of Consultation: 10/26/2021    HISTORY OF PRESENT ILLNESS:   Mr. Arielle Moreno is a 55-AYYD-PDC  male who follows with Dr. Crissy Jolley. He was most recently seen in consultation with Dr. Asuncion Tom on 08/27/2021. His medical history includes CAD s/p NSTEMI PCI-RCA 9/13/2019, ischemic cardiomyopathy, NSVT, hypertension, hyperlipidemia, morbid obesity, obstructive sleep apnea, COPD/tobacco abuse, TIA, arthritis/fibromyalgia, depression. Mr. Arielle Moreno presented to [facility] ED on 10/26/2021 with complaints of chest pain. He states that prior to presentation, over the course of the past month, he has been having intermittent midsternal chest pressure with exertion with radiation to his left arm, lasting 45 minutes in duration with accompanying dyspnea and diaphoresis. He states that last week this chest pain woke him from deep sleep. He states that when his chest pain occurs, he takes \"2 ASA and a heart burn pill and it usually goes away within 45 minutes\". He states that on 10/26/2021 he awake with midsternal chest pain with radiation to his left chest and down his left arm. He states that he took 2 ASA and \"a heart burn pill but after 45 minutes it got worse and I called 911\". He states that he received ASA and one SL NTG enroute with no change in his chest pain. Upon arrival to the ED his VS were 28-75-/122-98% on RA. EKG SR with LVH and inferior Q waves and lateral ST depression and T wave inversions. Troponin 92. CXR with multifocal ground glass infiltrates. Rapid Covid testing was negative. He received ASA 325mg, NTP 1\", GI cocktail, Fentanyl 50mcg, and Labetalol 10mg IV. He states that after receiving these medications, he has had no relief or change in his chest pain with radiation to his left arm. Repeat Troponin pending.  CTA of the chest bifurcating with mild proximal to mid disease. The second and third obtuse marginal branches were very large.  There was 30% proximal left circumflex  Stenosis. 4.  Right coronary artery:  It is a very large dominant artery giving rise to a conus branch, an SA vinay or AV vinay branch, two small right ventricular marginal branches, a PDA and a PLV branch.  There was  20% diffuse proximal RCA stenosis.  There was 40% to 50% tubular mid stenosis.  There was a hazy distal ulcerated plaque with filling defect probably due to clot noted. 4.5 x 30 Resolute Bk stent was deployed at 14 atmospheres at the distal RCA.  Then, a 5.0 x 15 Resolute [place] stent was deployed proximal to the first stent at the  mid RCA and was deployed at 14 atmospheres.   IMPRESSION: 1.   Severe unstable distal RCA plaque with probable clot, status post two drug eluting stents. 2.  Moderate proximal LAD stenosis. 3.  Significantly  elevated left ventricular end diastolic pressure. Mild left ventricular systolic dysfunction. 4. TTE 01/28/2020 (Dr. Brandee Parham):  Ejection fraction is visually estimated at 45%.  Severe left ventricular concentric hypertrophy noted. Windell Amanda is doppler  evidence of stage III diastolic dysfunction.  Physiologic and/or trace mitral regurgitation is present. 5. Hypertension  6. Hyperlipidemia  7. Morbid Obesity  8. JARRET compliant with CPAP  9. COPD/Ongoing tobacco abuse  10. Marijuana abuse  11. Arthritis  12. Fibromyalgia  13. Depression  14. Hx CHF  15. Hx TIA  16. Family Hx of premature CAD   16. S/p Tonsillectomy           Medications Prior to admit:  Prior to Admission medications    Medication Sig Start Date End Date Taking?  Authorizing Provider   albuterol sulfate HFA (VENTOLIN HFA) 108 (90 Base) MCG/ACT inhaler Inhale 2 puffs into the lungs 4 times daily as needed for Wheezing 8/16/21   Mari Weinstein, DO   QVAR REDIHALER 80 MCG/ACT AERB inhaler INHALE ONE PUFF BY MOUTH EVERY 12 HOURS WITH SPACER 1/6/21 Historical Provider, MD   furosemide (LASIX) 40 MG tablet TAKE ONE TABLET BY MOUTH EVERY DAY 1/4/21   Historical Provider, MD   traZODone (DESYREL) 50 MG tablet TAKE TWO TABLETS BY MOUTH EVERY DAY 12/30/20   Historical Provider, MD   amLODIPine (NORVASC) 10 MG tablet Take 1 tablet by mouth daily 1/11/21   Shelby Jones MD   albuterol (PROVENTIL) (2.5 MG/3ML) 0.083% nebulizer solution Take 3 mLs by nebulization every 6 hours as needed for Wheezing 1/14/20   YUMIKO Ortiz CNP   budesonide-formoterol [facility]) 160-4.5 MCG/ACT AERO Inhale 2 puffs into the lungs 2 times daily  Patient not taking: Reported on 1/11/2021 1/14/20   YUMIKO Ortiz CNP   atorvastatin (LIPITOR) 40 MG tablet Take 1 tablet by mouth daily  Patient not taking: Reported on 1/11/2021 1/14/20   YUMIKO Ortiz CNP   metoprolol succinate (TOPROL XL) 50 MG extended release tablet Take 1 tablet by mouth 2 times daily 1/14/20   YUMIKO Ortiz CNP   ranitidine (ZANTAC) 300 MG tablet Take 1 tablet by mouth nightly  Patient not taking: Reported on 1/11/2021 9/14/19   Aleksandra Angel DO   losartan (COZAAR) 100 MG tablet Take 1 tablet by mouth nightly  Patient not taking: Reported on 1/11/2021 7/10/18   Reuben Marrero MD   aspirin 81 MG tablet Take 81 mg by mouth daily     Historical Provider, MD       Current Medications:    Current Facility-Administered Medications: aspirin EC tablet 325 mg, 325 mg, Oral, Once  nitroglycerin (NITRO-BID) 2 % ointment 1 inch, 1 inch, Topical, Once    Allergies:  Pcn [penicillins]    Social History:    Currently smokes 1ppd every other day since the age of 14. Drinks alcohol on occasion. Most recently smoked marijuana 6 days ago. Drinks soda pop daily. Family History:   Father with initial MI in his 46s.      REVIEW OF SYSTEMS:     · Constitutional: Denies fevers, chills, night sweats, and fatigue  · HEENT: Denies headaches, nose bleeds, and blurred vision,oral pain, abscess or lesion. · Musculoskeletal: Denies falls, pain to BLE with ambulation and edema to BLE. · Neurological: Denies dizziness and lightheadedness, numbness and tingling  · Cardiovascular: Complains of chest pain. Denies palpitations, and feelings of heart racing. · Respiratory: Denies orthopnea and PND. Complains of SWENSON  · Gastrointestinal: Denies heartburn, nausea/vomiting, diarrhea and constipation, black/bloody, and tarry stools. · Genitourinary: Denies dysuria and hematuria  · Hematologic: Denies excessive bruising or bleeding  · Lymphatic: Denies lumps and bumps to neck, axilla, breast, and groin  · Endocrine: Denies excessive thirst. Denies intolerance to hot and cold  · Psychiatric: Denies anxiety and depression. PHYSICAL EXAM:   BP (!) 168/111   Pulse 72   Temp 96.6 °F (35.9 °C) (Infrared)   Resp 20   Ht 6' (1.829 m)   Wt (!) 382 lb (173.3 kg)   SpO2 97%   BMI 51.81 kg/m²   CONST:  Well developed, morbidly obese, middle aged male who appears stated age. Awake, alert, cooperative, appears uncomfortable  HEENT:   Head- Normocephalic, atraumatic   Eyes- Conjunctivae pink, anicteric  Throat- Oral mucosa pink and moist  Neck-  No stridor, trachea midline, no jugular venous distention. No adenopathy   CHEST: Chest symmetrical and non-tender to palpation. No accessory muscle use or intercostal retractions  RESPIRATORY: Lung sounds - clear, diminished throughout fields   CARDIOVASCULAR:     No carotid bruit  Heart Inspection- shows no noted pulsations  Heart Palpation- no heaves or thrills; PMI is non-displaced   Heart Ausculation- Regular rate and rhythm, no murmur. No s3, s4 or rub   PV: trace bilateral lower extremity edema. No varicosities. Pedal pulses palpable, no clubbing or cyanosis   ABDOMEN: Soft, morbidly obese, non-tender to light palpation. Bowel sounds present. No palpable masses no organomegaly; no abdominal bruit  MS: Good muscle strength and tone. No atrophy or abnormal movements.    : cardiomyopathy with EF 45% on echo in 01/2020 (with severe LVH, stage III Diastolic Dysfunction, and trace MR). 4. Chronic combined systolic and diastolic CHF. No signs of volume overload. 5. HTN, uncontrolled   6. HLD  7. Super Morbid Obesity   8. JARRET  9. COPD with continued tobacco abuse   10. Marijuana abuse   11. Hx of TIA   12. Arthritis/Fibromyalgia      PLAN:   1. Start IV NTG and IV Heparin   2. Resume all home medications   3. Cycle cardiac enzymes  4. Cath +/- PCI (vs a myocardial perfusion imaging study) pending the results of the troponin levels.    5. Will follow     Electronically signed by Hellen Ryan MD on 10/26/2021 at 2:35 PM

## 2021-10-26 NOTE — CONSULTS
Patient seen and examined. Chart, labs, imaging studies, EKG and rhythm strips reviewed. Full consult to follow. We were asked to see the patient for chest pain with ST depressions. IMPRESSION:  1. Chest pain, possible ACS. 2. Known CAD with Hx of 2 overlapping LEANNA to the mid and distal RCA in 09/2019 (with moderate 50% disease to the proximal LAD at that time)  3. Ischemic cardiomyopathy with EF 45% on echo in 01/2020 (with severe LVH, stage III Diastolic Dysfunction, and trace MR). 4. Chronic combined systolic and diastolic CHF. No signs of volume overload. 5. HTN, uncontrolled   6. HLD  7. Super Morbid Obesity   8. JARRET  9. COPD with continued tobacco abuse   10. Marijuana abuse   11. Hx of TIA   12. Arthritis/Fibromyalgia     PLAN:   1. Start IV NTG and IV Heparin   2. Resume all home medications   3. Cycle cardiac enzymes  4. Cath +/- PCI (vs a myocardial perfusion imaging study) pending the results of the troponin levels.    5. Will follow    Electronically signed by Dinesh Alex MD on 10/26/2021 at 2:35 PM

## 2021-10-27 LAB
APTT: 28.5 SEC (ref 24.5–35.1)
BASOPHILS ABSOLUTE: 0.03 E9/L (ref 0–0.2)
BASOPHILS RELATIVE PERCENT: 0.2 % (ref 0–2)
EKG ATRIAL RATE: 80 BPM
EKG P AXIS: 44 DEGREES
EKG P-R INTERVAL: 196 MS
EKG Q-T INTERVAL: 376 MS
EKG QRS DURATION: 86 MS
EKG QTC CALCULATION (BAZETT): 433 MS
EKG R AXIS: -30 DEGREES
EKG T AXIS: 106 DEGREES
EKG VENTRICULAR RATE: 80 BPM
EOSINOPHILS ABSOLUTE: 0.02 E9/L (ref 0.05–0.5)
EOSINOPHILS RELATIVE PERCENT: 0.1 % (ref 0–6)
HCT VFR BLD CALC: 42.1 % (ref 37–54)
HEMOGLOBIN: 14.1 G/DL (ref 12.5–16.5)
IMMATURE GRANULOCYTES #: 0.08 E9/L
IMMATURE GRANULOCYTES %: 0.6 % (ref 0–5)
LYMPHOCYTES ABSOLUTE: 1.21 E9/L (ref 1.5–4)
LYMPHOCYTES RELATIVE PERCENT: 9 % (ref 20–42)
MCH RBC QN AUTO: 32 PG (ref 26–35)
MCHC RBC AUTO-ENTMCNC: 33.5 % (ref 32–34.5)
MCV RBC AUTO: 95.7 FL (ref 80–99.9)
METER GLUCOSE: 232 MG/DL (ref 74–99)
METER GLUCOSE: 242 MG/DL (ref 74–99)
MONOCYTES ABSOLUTE: 1.01 E9/L (ref 0.1–0.95)
MONOCYTES RELATIVE PERCENT: 7.5 % (ref 2–12)
NEUTROPHILS ABSOLUTE: 11.08 E9/L (ref 1.8–7.3)
NEUTROPHILS RELATIVE PERCENT: 82.6 % (ref 43–80)
PDW BLD-RTO: 13.1 FL (ref 11.5–15)
PLATELET # BLD: 211 E9/L (ref 130–450)
PMV BLD AUTO: 9.6 FL (ref 7–12)
POC ACT LR: 188 SECONDS
POC ACT LR: 220 SECONDS
POC ACT LR: >400 SECONDS
RBC # BLD: 4.4 E12/L (ref 3.8–5.8)
TROPONIN, HIGH SENSITIVITY: 1595 NG/L (ref 0–11)
TROPONIN, HIGH SENSITIVITY: 1795 NG/L (ref 0–11)
WBC # BLD: 13.4 E9/L (ref 4.5–11.5)

## 2021-10-27 PROCEDURE — 2140000000 HC CCU INTERMEDIATE R&B

## 2021-10-27 PROCEDURE — 85730 THROMBOPLASTIN TIME PARTIAL: CPT

## 2021-10-27 PROCEDURE — 94664 DEMO&/EVAL PT USE INHALER: CPT

## 2021-10-27 PROCEDURE — C1887 CATHETER, GUIDING: HCPCS

## 2021-10-27 PROCEDURE — 93010 ELECTROCARDIOGRAM REPORT: CPT | Performed by: INTERNAL MEDICINE

## 2021-10-27 PROCEDURE — 6360000002 HC RX W HCPCS: Performed by: INTERNAL MEDICINE

## 2021-10-27 PROCEDURE — 2580000003 HC RX 258: Performed by: INTERNAL MEDICINE

## 2021-10-27 PROCEDURE — 6360000002 HC RX W HCPCS

## 2021-10-27 PROCEDURE — C1894 INTRO/SHEATH, NON-LASER: HCPCS

## 2021-10-27 PROCEDURE — 85347 COAGULATION TIME ACTIVATED: CPT

## 2021-10-27 PROCEDURE — 93005 ELECTROCARDIOGRAM TRACING: CPT | Performed by: INTERNAL MEDICINE

## 2021-10-27 PROCEDURE — 84484 ASSAY OF TROPONIN QUANT: CPT

## 2021-10-27 PROCEDURE — 85025 COMPLETE CBC W/AUTO DIFF WBC: CPT

## 2021-10-27 PROCEDURE — 82962 GLUCOSE BLOOD TEST: CPT

## 2021-10-27 PROCEDURE — C9600 PERC DRUG-EL COR STENT SING: HCPCS | Performed by: INTERNAL MEDICINE

## 2021-10-27 PROCEDURE — 2500000003 HC RX 250 WO HCPCS: Performed by: INTERNAL MEDICINE

## 2021-10-27 PROCEDURE — 6370000000 HC RX 637 (ALT 250 FOR IP): Performed by: INTERNAL MEDICINE

## 2021-10-27 PROCEDURE — C1760 CLOSURE DEV, VASC: HCPCS

## 2021-10-27 PROCEDURE — 36415 COLL VENOUS BLD VENIPUNCTURE: CPT

## 2021-10-27 PROCEDURE — 2500000003 HC RX 250 WO HCPCS

## 2021-10-27 PROCEDURE — C1725 CATH, TRANSLUMIN NON-LASER: HCPCS

## 2021-10-27 PROCEDURE — C1769 GUIDE WIRE: HCPCS

## 2021-10-27 PROCEDURE — 6370000000 HC RX 637 (ALT 250 FOR IP)

## 2021-10-27 PROCEDURE — 2709999900 HC NON-CHARGEABLE SUPPLY

## 2021-10-27 PROCEDURE — B2111ZZ FLUOROSCOPY OF MULTIPLE CORONARY ARTERIES USING LOW OSMOLAR CONTRAST: ICD-10-PCS | Performed by: INTERNAL MEDICINE

## 2021-10-27 PROCEDURE — 99024 POSTOP FOLLOW-UP VISIT: CPT | Performed by: INTERNAL MEDICINE

## 2021-10-27 PROCEDURE — 4A023N7 MEASUREMENT OF CARDIAC SAMPLING AND PRESSURE, LEFT HEART, PERCUTANEOUS APPROACH: ICD-10-PCS | Performed by: INTERNAL MEDICINE

## 2021-10-27 PROCEDURE — C1874 STENT, COATED/COV W/DEL SYS: HCPCS

## 2021-10-27 PROCEDURE — 94640 AIRWAY INHALATION TREATMENT: CPT

## 2021-10-27 PROCEDURE — 027034Z DILATION OF CORONARY ARTERY, ONE ARTERY WITH DRUG-ELUTING INTRALUMINAL DEVICE, PERCUTANEOUS APPROACH: ICD-10-PCS | Performed by: INTERNAL MEDICINE

## 2021-10-27 PROCEDURE — 94660 CPAP INITIATION&MGMT: CPT

## 2021-10-27 PROCEDURE — 93458 L HRT ARTERY/VENTRICLE ANGIO: CPT | Performed by: INTERNAL MEDICINE

## 2021-10-27 RX ORDER — CLOPIDOGREL BISULFATE 75 MG/1
75 TABLET ORAL DAILY
Status: DISCONTINUED | OUTPATIENT
Start: 2021-10-28 | End: 2021-10-28 | Stop reason: HOSPADM

## 2021-10-27 RX ORDER — HEPARIN SODIUM 1000 [USP'U]/ML
2000 INJECTION, SOLUTION INTRAVENOUS; SUBCUTANEOUS PRN
Status: DISCONTINUED | OUTPATIENT
Start: 2021-10-27 | End: 2021-10-27

## 2021-10-27 RX ORDER — SODIUM CHLORIDE 9 MG/ML
INJECTION, SOLUTION INTRAVENOUS CONTINUOUS
Status: ACTIVE | OUTPATIENT
Start: 2021-10-27 | End: 2021-10-27

## 2021-10-27 RX ORDER — ASPIRIN 81 MG/1
81 TABLET, CHEWABLE ORAL DAILY
Status: DISCONTINUED | OUTPATIENT
Start: 2021-10-27 | End: 2021-10-28 | Stop reason: HOSPADM

## 2021-10-27 RX ORDER — NITROGLYCERIN 0.4 MG/1
0.4 TABLET SUBLINGUAL EVERY 5 MIN PRN
Status: DISCONTINUED | OUTPATIENT
Start: 2021-10-27 | End: 2021-10-28 | Stop reason: HOSPADM

## 2021-10-27 RX ORDER — SODIUM CHLORIDE 9 MG/ML
25 INJECTION, SOLUTION INTRAVENOUS PRN
Status: DISCONTINUED | OUTPATIENT
Start: 2021-10-27 | End: 2021-10-28 | Stop reason: HOSPADM

## 2021-10-27 RX ORDER — SODIUM CHLORIDE 0.9 % (FLUSH) 0.9 %
5-40 SYRINGE (ML) INJECTION EVERY 12 HOURS SCHEDULED
Status: DISCONTINUED | OUTPATIENT
Start: 2021-10-27 | End: 2021-10-28 | Stop reason: HOSPADM

## 2021-10-27 RX ORDER — SODIUM CHLORIDE 0.9 % (FLUSH) 0.9 %
5-40 SYRINGE (ML) INJECTION PRN
Status: DISCONTINUED | OUTPATIENT
Start: 2021-10-27 | End: 2021-10-28 | Stop reason: HOSPADM

## 2021-10-27 RX ORDER — HEPARIN SODIUM 1000 [USP'U]/ML
4000 INJECTION, SOLUTION INTRAVENOUS; SUBCUTANEOUS PRN
Status: DISCONTINUED | OUTPATIENT
Start: 2021-10-27 | End: 2021-10-27

## 2021-10-27 RX ADMIN — METOPROLOL SUCCINATE 50 MG: 50 TABLET, EXTENDED RELEASE ORAL at 21:00

## 2021-10-27 RX ADMIN — SODIUM CHLORIDE: 9 INJECTION, SOLUTION INTRAVENOUS at 17:24

## 2021-10-27 RX ADMIN — INSULIN LISPRO 1 UNITS: 100 INJECTION, SOLUTION INTRAVENOUS; SUBCUTANEOUS at 21:30

## 2021-10-27 RX ADMIN — ACETAMINOPHEN 650 MG: 325 TABLET ORAL at 21:00

## 2021-10-27 RX ADMIN — Medication 10 ML: at 21:03

## 2021-10-27 RX ADMIN — INSULIN LISPRO 2 UNITS: 100 INJECTION, SOLUTION INTRAVENOUS; SUBCUTANEOUS at 17:50

## 2021-10-27 RX ADMIN — ACETAMINOPHEN 650 MG: 325 TABLET ORAL at 06:44

## 2021-10-27 RX ADMIN — METOPROLOL SUCCINATE 50 MG: 50 TABLET, EXTENDED RELEASE ORAL at 11:51

## 2021-10-27 RX ADMIN — BUDESONIDE 500 MCG: 0.5 SUSPENSION RESPIRATORY (INHALATION) at 19:40

## 2021-10-27 RX ADMIN — NITROGLYCERIN 90 MCG/MIN: 20 INJECTION INTRAVENOUS at 05:40

## 2021-10-27 RX ADMIN — FUROSEMIDE 40 MG: 40 TABLET ORAL at 11:52

## 2021-10-27 RX ADMIN — Medication 10 ML: at 11:59

## 2021-10-27 RX ADMIN — HEPARIN SODIUM 4000 UNITS: 1000 INJECTION, SOLUTION INTRAVENOUS; SUBCUTANEOUS at 06:10

## 2021-10-27 RX ADMIN — NITROGLYCERIN 100 MCG/MIN: 20 INJECTION INTRAVENOUS at 03:20

## 2021-10-27 RX ADMIN — ARFORMOTEROL TARTRATE 15 MCG: 15 SOLUTION RESPIRATORY (INHALATION) at 19:41

## 2021-10-27 RX ADMIN — ALBUTEROL SULFATE 2.5 MG: 2.5 SOLUTION RESPIRATORY (INHALATION) at 19:41

## 2021-10-27 RX ADMIN — SODIUM CHLORIDE: 9 INJECTION, SOLUTION INTRAVENOUS at 11:30

## 2021-10-27 RX ADMIN — AMLODIPINE BESYLATE 10 MG: 10 TABLET ORAL at 11:52

## 2021-10-27 RX ADMIN — ACETAMINOPHEN 650 MG: 325 TABLET ORAL at 11:52

## 2021-10-27 ASSESSMENT — PAIN DESCRIPTION - ONSET
ONSET: ON-GOING
ONSET: ON-GOING

## 2021-10-27 ASSESSMENT — PAIN DESCRIPTION - ORIENTATION: ORIENTATION: MID

## 2021-10-27 ASSESSMENT — PAIN DESCRIPTION - PROGRESSION
CLINICAL_PROGRESSION: NOT CHANGED
CLINICAL_PROGRESSION: NOT CHANGED

## 2021-10-27 ASSESSMENT — PAIN DESCRIPTION - DESCRIPTORS
DESCRIPTORS: ACHING
DESCRIPTORS: ACHING;SHOOTING;SHARP;DISCOMFORT

## 2021-10-27 ASSESSMENT — PAIN SCALES - GENERAL
PAINLEVEL_OUTOF10: 5
PAINLEVEL_OUTOF10: 0
PAINLEVEL_OUTOF10: 5
PAINLEVEL_OUTOF10: 0
PAINLEVEL_OUTOF10: 4
PAINLEVEL_OUTOF10: 7

## 2021-10-27 ASSESSMENT — PAIN DESCRIPTION - FREQUENCY
FREQUENCY: CONTINUOUS
FREQUENCY: INTERMITTENT

## 2021-10-27 ASSESSMENT — PAIN DESCRIPTION - LOCATION
LOCATION: CHEST
LOCATION: HEAD

## 2021-10-27 ASSESSMENT — PAIN DESCRIPTION - DIRECTION: RADIATING_TOWARDS: L ARM

## 2021-10-27 ASSESSMENT — PAIN DESCRIPTION - PAIN TYPE
TYPE: ACUTE PAIN
TYPE: ACUTE PAIN

## 2021-10-27 NOTE — CARE COORDINATION
SOCIAL WORK/CASEMANAGEMENT TRANSITION OF CARE VEYARTPV[address] [place], [address], [place], [phone]): I met with pt In the room this a.m. he is not very happy with a lack of sleep he said. His plan is back home with his dog. His fiance will be in to check on him after work. Pt is not a . Will check on pt in a.m. he thinks he is going home tomorrow.  TOBIN Quiroz  10/27/2021

## 2021-10-27 NOTE — PROGRESS NOTES
Date: 10/26/2021    Time: 8:13 PM    Patient Placed On BIPAP/CPAP/ Non-Invasive Ventilation? Yes    If no must comment. Facial area red/color change? No           If YES are Blister/Lesion present? No   If yes must notify nursing staff  BIPAP/CPAP skin barrier?   Yes    Skin barrier type:mepilexlite       Comments:        Marshall Cardenas RCP

## 2021-10-27 NOTE — H&P
Rupinder Alexander is a 49 y.o. male  Chief Complaint   Patient presents with    Chest Pain     woke pt up out of sleep, 10/10 radiating down right arm 324 aspirin given and 1 nitro no relief      HPI  As above, he states he is still having SOB, but chest pain better. He admits to not being compliant with his diet, states he ate a whole pizza on Sunday with meat. No current facility-administered medications on file prior to encounter.      Current Outpatient Medications on File Prior to Encounter   Medication Sig Dispense Refill    metFORMIN (GLUCOPHAGE) 1000 MG tablet Take 1,000 mg by mouth 2 times daily (with meals)      ibuprofen (ADVIL;MOTRIN) 400 MG tablet Take 400 mg by mouth every 6 hours as needed for Pain      albuterol sulfate HFA (VENTOLIN HFA) 108 (90 Base) MCG/ACT inhaler Inhale 2 puffs into the lungs 4 times daily as needed for Wheezing 1 Inhaler 0    QVAR REDIHALER 80 MCG/ACT AERB inhaler INHALE ONE PUFF BY MOUTH EVERY 12 HOURS WITH SPACER      furosemide (LASIX) 40 MG tablet TAKE ONE TABLET BY MOUTH EVERY DAY      traZODone (DESYREL) 50 MG tablet TAKE TWO TABLETS BY MOUTH EVERY DAY      amLODIPine (NORVASC) 10 MG tablet Take 1 tablet by mouth daily 90 tablet 5    budesonide-formoterol (SYMBICORT) 160-4.5 MCG/ACT AERO Inhale 2 puffs into the lungs 2 times daily 1 Inhaler 3    metoprolol succinate (TOPROL XL) 50 MG extended release tablet Take 1 tablet by mouth 2 times daily 60 tablet 3     Allergies   Allergen Reactions    Pcn [Penicillins] Other (See Comments)     As a child, does not remembered       Past Medical History:   Diagnosis Date    Accelerated hypertension 5/5/2017    Arthritis     CAD (coronary artery disease)     Cerebral artery occlusion with cerebral infarction (HCC)     TIA    CHF (congestive heart failure) (HCC)     COPD (chronic obstructive pulmonary disease) (HCC)     Depression with anxiety     Fibromyalgia     GI bleeding     Hyperlipidemia     Hypertension  Myocardial infarct ([facility] [address]     in past, per testing, date unknown    Sleep apnea     cpap    Urinary frequency      Past Surgical History:   Procedure Laterality Date    CARDIAC CATHETERIZATION  03/16/2017    Dr Bubba Grady  04/12/2018    Dr. Cecilio Reyes  08/18/2017    CORONARY ANGIOPLASTY WITH STENT PLACEMENT  09/13/2019    rca distal 4.5x 30    ENDOSCOPY, COLON, DIAGNOSTIC  08/18/2017    OR [address] INCL FLUOR GDNCE DX W/CELL WASHG SPX N/A 10/22/2018    DIRECT L[facility]NGOSCOPY [facility]    OR LARYNGOSCOPY,DIRCT,OP SCOPE,BIOPSY N/A 11/1/2018    MICRO LARYNGOSCOPY WITH BIOPSY TRUE VOCAL CORD LESION performed by Richard Qureshi DO at [facility] OR    TONSILLECTOMY       Social History     Socioeconomic History    Marital status: Single     Spouse name: Not on file    Number of children: Not on file    Years of education: Not on file    Highest education level: Not on file   Occupational History    Not on file   Tobacco Use    Smoking status: Current Every Day Smoker     Packs/day: 0.50     Years: 33.00     Pack years: 16.50     Types: Cigarettes    Smokeless tobacco: Never Used   Vaping Use    Vaping Use: Never used   Substance and Sexual Activity    Alcohol use: Yes     Alcohol/week: 6.0 standard drinks     Types: 6 Cans of beer per week     Comment: social    Drug use: Yes     Types: Marijuana     Comment: once a week    Sexual activity: Not on file   Other Topics Concern    Not on file   Social History Narrative    Drinks occ [employer]. Social Determinants of Health     Financial Resource Strain:     Difficulty of Paying Living Expenses:    Food Insecurity:     Worried About Running Out of Food in the Last Year:     [address] in the Last Year:    Transportation Needs:     Lack of Transportation (Medical):      Lack of Transportation (Non-Medical):    Physical Activity:     Days of Exercise per Week:  Minutes of Exercise per Session:    Stress:     Feeling of Stress :    Social Connections:     Frequency of Communication with Friends and Family:     Frequency of Social Gatherings with Friends and Family:     Attends [employer] Services:     Active Member of Clubs or Organizations:     Attends Club or Organization Meetings:     Marital Status:    Intimate Partner Violence:     Fear of Current or Ex-Partner:     Emotionally Abused:     Physically Abused:     Sexually Abused:      Family History   Problem Relation Age of Onset    Heart Disease Mother     Other Father     No Known Problems Sister     No Known Problems Brother     No Known Problems Sister     No Known Problems Brother          ROS  Patient positive for  SOB, chest pain   Patient denies any fever, chills, night sweats, weight changes   Denies headache, visual changes,   Denies dysphagia, odynophagia dysphonia,   Denies  cough, sputum production,   Denies  pressure, orthopnea, palpitations,   Denies abd pain, N/V/D/C/melena, hematochezia,   Denies urinary frequency, urgency, dysuria, hematuria,   Denies any acute muscle aches, paresthesias, weakness, seizure, syncopal episodes, Denies depression, anxiety.   OBJECTIVE  Vitals:    10/27/21 0615   BP: 120/60   Pulse: 77   Resp:    Temp:    SpO2:      Gen: AO3, NAD  Head: AT/NC, PERRL, EOMIx2, no icterus, conjunctival injection  Neck: No JVD, carotid bruits, LAD, thyroid non-palpable  Heart: RRR with no murmurs, rubs, gallops  Lungs: diminished in all fields   Abd: soft, NT, ND, BS+, no G/R, no HSM  Ext: No C/C +edmea lower pulses intact distally B/L  Neuro: CN 2-12 grossly intact with no focal deficits  Lab Results   Component Value Date    WBC 13.4 (H) 10/27/2021    HGB 14.1 10/27/2021    HCT 42.1 10/27/2021    MCV 95.7 10/27/2021     10/27/2021     Lab Results   Component Value Date     10/26/2021    K 3.7 10/26/2021     10/26/2021    CO2 22 10/26/2021    BUN 17 10/26/2021    CREATININE 0.9 10/26/2021    GLUCOSE 284 10/26/2021    CALCIUM 9.9 10/26/2021        ASSESSMENT  1. Chest pain, unspecified type    2. Hypertensive emergency    3. Non compliance with diet  4.  JARRET  PLAN  Admit  Cardiology consulted

## 2021-10-27 NOTE — PROGRESS NOTES
Intake/Output Summary (Last 24 hours) at 10/27/2021 0727  Last data filed at 10/27/2021 0615  Gross per 24 hour   Intake --   Output 450 ml   Net -450 ml       Weight:   Wt Readings from Last 3 Encounters:   10/26/21 (!) 382 lb (173.3 kg)   08/26/21 (!) 382 lb (173.3 kg)   08/15/21 (!) 380 lb (172.4 kg)     Current Inpatient Medications:   aspirin  325 mg Oral Once    nitroglycerin  1 inch Topical Once    amLODIPine  10 mg Oral Daily    furosemide  40 mg Oral Daily    metoprolol succinate  50 mg Oral BID    traZODone  50 mg Oral BID    sodium chloride flush  5-40 mL IntraVENous 2 times per day    insulin lispro  0-6 Units SubCUTAneous TID WC    insulin lispro  0-3 Units SubCUTAneous Nightly    budesonide  0.5 mg Nebulization BID    Arformoterol Tartrate  15 mcg Nebulization BID       IV Infusions (if any):   heparin (PORCINE) Infusion 10 Units/kg/hr (10/27/21 0610)    nitroGLYCERIN 90 mcg/min (10/27/21 0540)    sodium chloride      dextrose         DIAGNOSTIC/ LABORATORY DATA:  Labs:   CBC:   Recent Labs     10/26/21  2117 10/27/21  0420   WBC 10.6 13.4*   HGB 14.6 14.1   HCT 43.3 42.1    211     BMP:   Recent Labs     10/26/21  1142      K 3.7   CO2 22   BUN 17   CREATININE 0.9   LABGLOM >60   CALCIUM 9.9     Mag: No results for input(s): MG in the last 72 hours. Phos: No results for input(s): PHOS in the last 72 hours. TFT:   Lab Results   Component Value Date    TSH 1.170 06/27/2018      HgA1c:   Lab Results   Component Value Date    LABA1C 8.2 (H) 08/28/2021     No results found for: EAG    BNP: No results for input(s): BNP in the last 72 hours.   PT/INR:   Recent Labs     10/26/21  1142   PROTIME 10.8   INR 1.0     APTT:  Recent Labs     10/26/21  2117 10/27/21  0420   APTT 65.1* 28.5     CARDIAC ENZYMES:  Recent Labs     10/26/21  1142 10/26/21  1432 10/26/21  2331 10/27/21  0420   TROPHS 92* 264* 1,595* 1,795*     FASTING LIPID PANEL:  Lab Results   Component Value Date CHOL 205 08/04/2020    HDL 33 08/04/2020    LDLCALC 127 08/04/2020    TRIG 224 08/04/2020     LIVER PROFILE:  Recent Labs     10/26/21  1142   AST 37   ALT 36   LABALBU 4.3       10/26/2021 CXR:  Limited portable CXR however the appearance of the chest is suspicious for multifocal bilateral groundglass infiltrates. CT of the chest is recommended. CTA chest 10/26/21:  1. No pulmonary embolism. 2. No evidence of thoracic aortic aneurysm or dissection. 3.  No acute cardiopulmonary abnormality. 4. Hepatic steatosis.     Telemetry: SR    12 lead EKG 10/27/21: SR. LAD. Possible old inferior MI. Septal MI, age undetermined. T wave inversions in the lateral leads    ASSESSMENT:   1. NSTEMI with continued CP ( despite aggressive medical therapy )  2. Known CAD with Hx of 2 overlapping LEANNA to the mid and distal RCA in 09/2019 (with moderate 50% disease to the proximal LAD at that time)  3. Ischemic cardiomyopathy with EF 45% on echo in 01/2020 (with severe LVH, stage III Diastolic Dysfunction, and trace MR). 4. Chronic combined systolic and diastolic CHF. No signs of volume overload. 5. HTN, uncontrolled   6. HLD  7. Super Morbid Obesity   8. JARRET  9. COPD with continued tobacco abuse   10. Marijuana abuse   11. Hx of TIA     12. Arthritis/Fibromyalgia      13. Medical non compliance ( patient did not take his Brilinta after discharge from the hospital in 9/2019 after the PCI/LEANNA procedure to the RCA at that time )        PLAN:  1. Urgent Cath +/- PCI this am. Risks, benefits and alternative therapies to the procedure explained. He understood and consented to proceed  2.  Further recommendations to follow    Electronically signed by Jovan Da Silva MD on 10/27/2021 at 7:27 AM

## 2021-10-27 NOTE — PROGRESS NOTES
Dr. Shayla Michaud paged via [address] [place] [address] @ [phone] about pt increasing chest pain, and troponins. Awaiting response.

## 2021-10-27 NOTE — OP NOTE
Operative Note      Patient: Seymour Bee  Date of Birth: [birth year]  MRN: [id number]    Date of Procedure: 10/27/21      Indication:    1. NSTEMI  2. Catheterization: AUC score 9 . Indication 3.  3. PCI: AUC score 7 Indication 16. Procedure: Left Heart Catheterization, coronary angiography, percutaneous coronary intervention to LAD      Anesthesia: Versed, Fentanyl  Time sedation was administered: 08:12. I was present in the room when sedation was administered. Procedure end time: 09:04  Time spent with face to face monitoring of moderate sedation: 68 minutes    Cardiac cath performed via right femoral approach using 6F sheath. Findings:   Left main: 0% stenosis  LAD: 100 %   stenosis  Circumflex: 50%   stenosis. RCA: Dominant. 50 %  stenosis. LV angio: not performed%  ejection fraction      Hemodynamics: Ao: 109/82 ( 93 )  LV: 118  LVEDP: 37    Interventional procedure:   1. PCI vessel: LAD. Lesion type: C. MIKI 0 flow pre PCI. Angioplasty performed with 2.5 mm balloon. Stenting performed with 3.0 mm Post dilated with 4.o mm non compliant balloon . Result: 0% residual stenosis and MIKI III distal flow. Drugs: . Anticoagulation was maintained with IV heparin . Brilinta 180 mg load given  in cath lab. Right femoral sheath removed and site closed with angio seal. There was good distal pulses in the RLE at the end of the procedure. Complication: None   Blood loss: 10-20 c  Contrast used: 120 cc      Post op diagnosis:  CAD  Markedly elevated LVedp  Successful PCI to LAD    Plan:  DAPT  Risk profile modification.    See orders      Electronically signed by Otto Aguilar MD on 10/27/2021 at 9:26 AM

## 2021-10-27 NOTE — PROCEDURES
[address]                  [address] [facility],  [facility]                            CARDIAC CATHETERIZATION    PATIENT NAME: Gayle Daugherty                 :        1965  MED REC NO:   [id number]                            ROOM:       6836  ACCOUNT NO:   [de-identified]                           ADMIT DATE: 10/26/2021  PROVIDER:     Deandre Blanca MD    DATE OF PROCEDURE:  10/27/2021    CARDIAC CATHETERIZATION AND ANGIOPLASTY REPORT    PROCEDURES:  Left heart catheterization, selective coronary angiography,  balloon angioplasty with deployment of drug-eluting coronary stent to  the proximal LAD with dilatation of the stent post deployment with a  larger noncompliant balloon. The right femoral artery angiography and  closure of the access site with Angio-Seal device. The procedure was done through right femoral artery approach using  ultrasound guidance. The patient received intravenous Versed and intravenous fentanyl for  sedation. INDICATION:  Non-ST-elevation myocardial infarction with continued chest  pain despite aggressive medical therapy. PRESSURES:  1. Aorta 109/82 with a mean of 93.  2.  Left ventricular systolic pressure 352, left ventricular  end-diastolic pressure 37.  3.  There was no significant gradient across the aortic valve. CORONARY ANGIOGRAPHY:  LEFT MAIN:  The left main artery has around 20% distal vessel tapering. LAD:  The left anterior descending artery has around 50% ostial  narrowing and 70% to 80% tapering and then total occlusion at the origin  of the first diagonal branch in the proximal vessel. LCX:  The left circumflex is a large, but nondominant vessel. It gives  rise to a small to moderate size first obtuse marginal branch which  itself divides distally into two subdivisions. There is a sequential  80% to 90% stenoses in the proximal segment of this marginal branch.    There is around 30% to 40% disease in the left circumflex shortly after  the high first marginal branch and just after the AV groove branch and  another 30% to 40% mid vessel disease just after the origin of a small  third marginal branch. The left circumflex terminates into two large  terminal lateral branches. The more superior lateral branch did not  appear to have any significant disease. The more inferior lateral  branch had luminal irregularities with around 30% luminal narrowing. RCA:  The right coronary artery is a large, dominant vessel. It has  luminal irregularities along its course. There is around 30% to 40%  proximal and mid vessel narrowing. The stents that extended from the  mid to distal vessel are widely patent with RCA tapering down to become  half of its size after the stented segment in the distal vessel. The  posterolateral branch and the posterior descending artery branch did not  appear to have any significant disease. The distal RCA appeared to  provide faint collaterals to the LAD. INTERVENTIONAL PROCEDURE:  EQUIPMENTS:  1.  6-Ira[place] JL4 Launcher guide catheter. 2.  0.014/300 ChoICE Extra Support J-exchange wire. 3.  0.014/300 ChoICE Floppy J-exchange wire. 4.  2.5 mm x 20 mm Emerge balloon. 5.  3.0 mm x 22 mm Medtronic Crook Resolute drug-eluting coronary stent. 6.  4.0 mm x 12 mm Quantum Largo monorail noncompliant balloon. TECHNIQUE:  The procedure was done through right femoral artery approach  using ultrasound guidance. A decision was made to go through the right  femoral artery because the patient did not cooperate during his previous  procedure around two years ago because of shoulder pain and he did state  that he cannot lie flat comfortably with his right arm extended during  the procedure. He was given intravenous Versed and intravenous fentanyl  for sedation. Intracoronary nitroglycerin was given during the  procedure.   The patient was given 180 mg of crushed Brilinta to swallow  during the procedure. He received boluses of heparin to maintain the  ACT between 250 and 300:  He arrived to the cath lab on IV heparin which  was discontinued at the beginning of the procedure. He was on IV  nitroglycerin also arriving to the cath lab and that was continued  during the procedure and was discontinued at the end of the procedure. The left coronary artery was selectively engaged with the 6-Telugu JL4  guide catheter. An extra support exchange wire was initially used and  this was advanced through the diagonal branch and was kept there to  anchor the guide in the left main artery. A floppy wire was then  successfully advanced through the total occlusion in the LAD where it  took off at a significant angulation and this wire was advanced further  distally into the LAD. The site of total occlusion was then dilated  with a 2.5 mm x 20 mm balloon inflated at the site to 6 atmospheres. This was followed by advancing a 3.0 mm x 22 mm Bk Resolute  drug-eluting coronary stent which was deployed at 14 atmospheres. The  stent was then dilated with a 4.0 mm x 12 mm noncompliant balloon  inflated to 12, 14, and 16 atmospheres from more distally to more  proximally. Contrast injection after intracoronary nitroglycerin administration  revealed very good results at the site without any significant residual  stenosis with no evidence of dissections or flaps and with MIKI-3 flow  in the vessel and there was no compromise to the flow in the diagonal  branch which was stented across. RIGHT FEMORAL ARTERY ANGIOGRAPHY:  The right common femoral artery and  the very proximal segments of the right superficial femoral artery and  the right profunda did not appear to have any significant disease. Closure of the access site was performed with Angio-Seal device with  achievement of adequate hemostasis.     The patient tolerated the procedure well and left the cardiac  catheterization laboratory in a stable condition. CONCLUSIONS:  1. Coronary artery disease:  A. Left main:  20% distal vessel narrowing. B.  LAD:  Occluded 50% ostial narrowing and 80%/100% proximal vessel  stenosis/occlusion. C.  LCX:  Large, but nondominant vessel with 80% sequential probe  proximal stenosis of a small to moderate size first obtuse marginal  branch and 30% to 40% proximal and mid to distal left circumflex disease  before two large terminal lateral branches (two other obtuse marginal  branches were trivial and insignificant). The AV groove branch which  was also insignificant did not appear to have any significant disease. D.  RCA:  Large, dominant vessel with around 30% to 40% proximal and mid  vessel narrowings with widely patent stents in the mid to distal vessel. 2.  Markedly elevated left ventricular end-diastolic pressure. 3.  Successful recanalization to the totally occluded LAD with balloon  angioplasty and deployment of drug-eluting coronary stent to the  proximal LAD with dilatation of the stent post deployment with a larger  noncompliant balloon with very good results with restoration of MIKI-3  flow in the vessel. 4.  Closure of the right femoral artery access site with Angio-Seal  device.         Brennan Del Real MD    D: 10/27/2021 9:45:20       T: 10/27/2021 9:50:18     [email or web address]  Job#: [id number]     Doc#: [id number]    CC:

## 2021-10-27 NOTE — PLAN OF CARE
Problem: Cardiac:  Goal: Ability to maintain an adequate cardiac output will improve  Description: Ability to maintain an adequate cardiac output will improve  Outcome: Ongoing     Problem: Cardiac:  Goal: Complications related to the disease process, condition or treatment will be avoided or minimized  Description: Complications related to the disease process, condition or treatment will be avoided or minimized  Outcome: Ongoing

## 2021-10-28 VITALS
WEIGHT: 315 LBS | RESPIRATION RATE: 20 BRPM | HEART RATE: 74 BPM | SYSTOLIC BLOOD PRESSURE: 108 MMHG | OXYGEN SATURATION: 99 % | DIASTOLIC BLOOD PRESSURE: 58 MMHG | BODY MASS INDEX: 42.66 KG/M2 | TEMPERATURE: 96.6 F | HEIGHT: 72 IN

## 2021-10-28 LAB
ANION GAP SERPL CALCULATED.3IONS-SCNC: 11 MMOL/L (ref 7–16)
BUN BLDV-MCNC: 13 MG/DL (ref 6–20)
CALCIUM SERPL-MCNC: 8.7 MG/DL (ref 8.6–10.2)
CHLORIDE BLD-SCNC: 98 MMOL/L (ref 98–107)
CO2: 25 MMOL/L (ref 22–29)
CREAT SERPL-MCNC: 0.9 MG/DL (ref 0.7–1.2)
EKG ATRIAL RATE: 77 BPM
EKG P AXIS: 39 DEGREES
EKG P-R INTERVAL: 176 MS
EKG Q-T INTERVAL: 374 MS
EKG QRS DURATION: 78 MS
EKG QTC CALCULATION (BAZETT): 423 MS
EKG R AXIS: -30 DEGREES
EKG T AXIS: 124 DEGREES
EKG VENTRICULAR RATE: 77 BPM
GFR AFRICAN AMERICAN: >60
GFR NON-AFRICAN AMERICAN: >60 ML/MIN/1.73
GLUCOSE BLD-MCNC: 214 MG/DL (ref 74–99)
LV EF: 53 %
LVEF MODALITY: NORMAL
METER GLUCOSE: 211 MG/DL (ref 74–99)
POTASSIUM REFLEX MAGNESIUM: 3.9 MMOL/L (ref 3.5–5)
SODIUM BLD-SCNC: 134 MMOL/L (ref 132–146)

## 2021-10-28 PROCEDURE — 93010 ELECTROCARDIOGRAM REPORT: CPT | Performed by: INTERNAL MEDICINE

## 2021-10-28 PROCEDURE — 80048 BASIC METABOLIC PNL TOTAL CA: CPT

## 2021-10-28 PROCEDURE — 6370000000 HC RX 637 (ALT 250 FOR IP): Performed by: INTERNAL MEDICINE

## 2021-10-28 PROCEDURE — 99233 SBSQ HOSP IP/OBS HIGH 50: CPT | Performed by: INTERNAL MEDICINE

## 2021-10-28 PROCEDURE — 2580000003 HC RX 258: Performed by: INTERNAL MEDICINE

## 2021-10-28 PROCEDURE — 93005 ELECTROCARDIOGRAM TRACING: CPT | Performed by: INTERNAL MEDICINE

## 2021-10-28 PROCEDURE — 93306 TTE W/DOPPLER COMPLETE: CPT

## 2021-10-28 PROCEDURE — 36415 COLL VENOUS BLD VENIPUNCTURE: CPT

## 2021-10-28 PROCEDURE — 82962 GLUCOSE BLOOD TEST: CPT

## 2021-10-28 PROCEDURE — 6360000004 HC RX CONTRAST MEDICATION

## 2021-10-28 PROCEDURE — 94660 CPAP INITIATION&MGMT: CPT

## 2021-10-28 RX ORDER — AMLODIPINE BESYLATE 5 MG/1
5 TABLET ORAL DAILY
Status: DISCONTINUED | OUTPATIENT
Start: 2021-10-29 | End: 2021-10-28 | Stop reason: HOSPADM

## 2021-10-28 RX ORDER — CLOPIDOGREL BISULFATE 75 MG/1
75 TABLET ORAL DAILY
Qty: 30 TABLET | Refills: 3 | Status: SHIPPED | OUTPATIENT
Start: 2021-10-29 | End: 2022-04-14 | Stop reason: CLARIF

## 2021-10-28 RX ORDER — LISINOPRIL 10 MG/1
10 TABLET ORAL DAILY
Qty: 30 TABLET | Refills: 3 | Status: SHIPPED | OUTPATIENT
Start: 2021-10-29 | End: 2022-04-14 | Stop reason: CLARIF

## 2021-10-28 RX ORDER — LISINOPRIL 10 MG/1
10 TABLET ORAL DAILY
Status: DISCONTINUED | OUTPATIENT
Start: 2021-10-28 | End: 2021-10-28 | Stop reason: HOSPADM

## 2021-10-28 RX ADMIN — INSULIN LISPRO 2 UNITS: 100 INJECTION, SOLUTION INTRAVENOUS; SUBCUTANEOUS at 09:48

## 2021-10-28 RX ADMIN — Medication 10 ML: at 08:51

## 2021-10-28 RX ADMIN — CLOPIDOGREL BISULFATE 75 MG: 75 TABLET ORAL at 08:47

## 2021-10-28 RX ADMIN — AMLODIPINE BESYLATE 10 MG: 10 TABLET ORAL at 08:47

## 2021-10-28 RX ADMIN — ASPIRIN 81 MG CHEWABLE TABLET 81 MG: 81 TABLET CHEWABLE at 08:47

## 2021-10-28 RX ADMIN — LISINOPRIL 10 MG: 10 TABLET ORAL at 12:06

## 2021-10-28 RX ADMIN — FUROSEMIDE 40 MG: 40 TABLET ORAL at 08:47

## 2021-10-28 RX ADMIN — METOPROLOL SUCCINATE 50 MG: 50 TABLET, EXTENDED RELEASE ORAL at 08:47

## 2021-10-28 ASSESSMENT — PAIN SCALES - GENERAL
PAINLEVEL_OUTOF10: 0
PAINLEVEL_OUTOF10: 0

## 2021-10-28 ASSESSMENT — PAIN DESCRIPTION - PAIN TYPE
TYPE: ACUTE PAIN
TYPE: ACUTE PAIN

## 2021-10-28 NOTE — PROGRESS NOTES
Patient left without his medications from pharmacy. He called into pharmacy and states he will pick them up tomorrow.

## 2021-10-28 NOTE — CARE COORDINATION
10/28/21 Update CM Note: POD 1 of heart cath with PCI to LAD. On plavix. aspirin. Plan is for discharge to home with family. Will follow for possible discharge today.  Kellen Mcgregor RN CM

## 2021-10-28 NOTE — CONSULTS
Met with patient and discussed that their physician has ordered a referral to our outpatient Phase II Cardiac Rehabilitation program. Reviewed the benefits of cardiac rehabilitation based on their diagnosis and personal risk factors. Patient demonstrates moderate interest in Cardiac Rehabilitation at this time. Cardiac Rehabilitation brochure provided to patient/family. The Cardiac Rehabilitation Program has been provided the patient's referral information and pertinent patient details and history. The patient may call [address] [place] [address] at [phone] for additional information or questions. Contact information for [address] [place] [address] and other choices close to the patient's residence have been provided in the discharge instructions so that the patient may call and schedule an appointment when cleared by their physician.  Thank you for the referral.

## 2021-10-28 NOTE — PROGRESS NOTES
Inpatient Cardiology Progress note     PATIENT IS BEING FOLLOWED FOR: CAD. NSTEMI    Skyler Sarai is a 49 y.o. male known to Dr. Thorne Covert: Denies CP  OBJECTIVE: No apparent distress     ROS:  Consist: Denies fevers, chills or night sweats  Heart: Denies palpitations, lightheadedness, dizziness or syncope  Lungs: Denies SOB, cough, wheezing, orthopnea or PND  GI: Denies abdominal pain, vomiting or diarrhea    PHYSICAL EXAM:   /69   Pulse 76   Temp 98.7 °F (37.1 °C) (Temporal)   Resp 22   Ht 6' (1.829 m)   Wt (!) 382 lb (173.3 kg)   SpO2 98%   BMI 51.81 kg/m²    CONST: Well developed, well nourished super morbidly obese male who appears of stated age. Awake, alert and cooperative. No apparent distress  HEENT:   Head- Normocephalic, atraumatic   Eyes- Conjunctivae pink, anicteric  Throat- Oral mucosa pink and moist  Neck-  No stridor, trachea midline, no jugular venous distention. No carotid bruit  CHEST: Chest symmetrical and non-tender to palpation. No accessory muscle use or intercostal retractions  RESPIRATORY:  Lung sounds - clear throughout fields   CARDIOVASCULAR:     Heart Inspection- shows no noted pulsations  Heart Palpation- no heaves or thrills; PMI is non-displaced   Heart Ausculation- Regular rate and rhythm, no murmur. No s3, s4 or rub   PV: Trace to 1+ lower extremity edema. No varicosities. Pedal pulses palpable, no clubbing or cyanosis   ABDOMEN: Soft, non-tender to light palpation. Bowel sounds present. No palpable masses no organomegaly; no abdominal bruit  MS: Good muscle strength and tone. No atrophy or abnormal movements. : Deferred  SKIN: Warm and dry no statis dermatitis or ulcers   NEURO / PSYCH: Oriented to person, place and time. Speech clear and appropriate. Follows all commands.  Pleasant affect       Intake/Output Summary (Last 24 hours) at 10/28/2021 0805  Last data filed at 10/27/2021 2100  Gross per 24 hour   Intake 500 ml   Output 900 ml   Net -400 ml       Weight:   Wt Readings from Last 3 Encounters:   10/26/21 (!) 382 lb (173.3 kg)   08/26/21 (!) 382 lb (173.3 kg)   08/15/21 (!) 380 lb (172.4 kg)     Current Inpatient Medications:   aspirin  81 mg Oral Daily    sodium chloride flush  5-40 mL IntraVENous 2 times per day    clopidogrel  75 mg Oral Daily    aspirin  325 mg Oral Once    nitroglycerin  1 inch Topical Once    amLODIPine  10 mg Oral Daily    furosemide  40 mg Oral Daily    metoprolol succinate  50 mg Oral BID    traZODone  50 mg Oral BID    insulin lispro  0-6 Units SubCUTAneous TID WC    insulin lispro  0-3 Units SubCUTAneous Nightly    budesonide  0.5 mg Nebulization BID    Arformoterol Tartrate  15 mcg Nebulization BID       IV Infusions (if any):   sodium chloride      dextrose         DIAGNOSTIC/ LABORATORY DATA:  Labs:   CBC:   Recent Labs     10/26/21  2117 10/27/21  0420   WBC 10.6 13.4*   HGB 14.6 14.1   HCT 43.3 42.1    211     BMP:   Recent Labs     10/26/21  1142 10/28/21  0513    134   K 3.7 3.9   CO2 22 25   BUN 17 13   CREATININE 0.9 0.9   LABGLOM >60 >60   CALCIUM 9.9 8.7   TFT:   Lab Results   Component Value Date    TSH 1.170 06/27/2018      HgA1c:   Lab Results   Component Value Date    LABA1C 8.2 (H) 08/28/2021     Recent Labs     10/26/21  1142   PROTIME 10.8   INR 1.0     APTT:  Recent Labs     10/26/21  2117 10/27/21  0420   APTT 65.1* 28.5     CARDIAC ENZYMES:  Recent Labs     10/26/21  1142 10/26/21  1432 10/26/21  2331 10/27/21  0420   TROPHS 92* 264* 1,595* 1,795*     FASTING LIPID PANEL:  Lab Results   Component Value Date    CHOL 205 08/04/2020    HDL 33 08/04/2020    LDLCALC 127 08/04/2020    TRIG 224 08/04/2020     LIVER PROFILE:  Recent Labs     10/26/21  1142   AST 37   ALT 36   LABALBU 4.3       10/26/2021 CXR:  Limited portable CXR however the appearance of the chest is suspicious for multifocal bilateral groundglass infiltrates. CT of the chest is recommended.     10/26/2021 Pulmonary CTA:  No evidence of pulmonary embolism or acute pulmonary abnormality. 10/26/2021 CT Chest with contrast:  1. No pulmonary embolism. 2. No evidence of thoracic aortic aneurysm or dissection. 3.  No acute cardiopulmonary abnormality. 4. Hepatic steatosis. 10/27/2021 Cardiac Catheterization (Dr. Delta Isidro):   CORONARY ANGIOGRAPHY:  LEFT MAIN:  The left main artery has around 20% distal vessel tapering.   LAD:  The left anterior descending artery has around 50% ostial  narrowing and 70% to 80% tapering and then total occlusion at the origin  of the first diagonal branch in the proximal vessel. LCX:  The left circumflex is a large, but nondominant vessel. It gives  rise to a small to moderate size first obtuse marginal branch which  itself divides distally into two subdivisions. There is a sequential  80% to 90% stenoses in the proximal segment of this marginal branch. There is around 30% to 40% disease in the left circumflex shortly after  the high first marginal branch and just after the AV groove branch and  another 30% to 40% mid vessel disease just after the origin of a small  third marginal branch. The left circumflex terminates into two large  terminal lateral branches. The more superior lateral branch did not  appear to have any significant disease. The more inferior lateral  branch had luminal irregularities with around 30% luminal narrowing. RCA:  The right coronary artery is a large, dominant vessel. It has  luminal irregularities along its course. There is around 30% to 40%  proximal and mid vessel narrowing. The stents that extended from the  mid to distal vessel are widely patent with RCA tapering down to become  half of its size after the stented segment in the distal vessel. The  posterolateral branch and the posterior descending artery branch did not  appear to have any significant disease. The distal RCA appeared to  provide faint collaterals to the LAD.   Successful recanalization to the totally occluded LAD with balloon  angioplasty and deployment of drug-eluting coronary stent to the  proximal LAD with dilatation of the stent post deployment with a larger  noncompliant balloon with very good results with restoration of MIKI-3  flow in the vessel. Echo 10/28/21:  Summary   Technically very limited study. Micro-bubble contrast injected to enhance left ventricular visualization. The left ventricle is mildly dilated. Severe concentric left ventricular hypertrophy. Possible anteroapical and apical anteroseptal hypokinesis   Ejection fraction is visually estimated at 50-55%. There is doppler evidence of stage II diastolic dysfunction. Normal right ventricular size and function. Normal sized left atrium. No gross valvular abnormalities noted. Telemetry: SR    12 lead EKG 10/28/21: SR. LAD. Anteroseptal MI. T wave inversions in the lateral leads      ASSESSMENT:   1. NSTEMI with no recurrence of CP post PCI to the LAD 10/27/21  2. Known CAD with Hx of 2 overlapping LEANNA to the mid and distal RCA in 09/2019  ( patent RCA stents on cardiac catheterization 10/27/21 ). 3. Ischemic cardiomyopathy with EF 50-55% on echo in 01/2020 (with severe LVH, stage III Diastolic Dysfunction, and trace MR). 4. Chronic combined systolic and diastolic CHF. No signs of volume overload. 5. HTN/LVH   6. HLD, on statin   7. Super Morbid Obesity   8. T2DM with HA1C of 8.2 in 08/2021  9. JARRET, on Bi Pap  10. COPD with continued tobacco abuse   11. Marijuana abuse   12. Hx of TIA     12. Arthritis/Fibromyalgia      13. H/o Medical non compliance ( patient did not take his Brilinta after discharge from the hospital in 9/2019 after the PCI/LEANNA procedure to the RCA at that time )     14. Leukocytosis (10/27) likely secondary to MI. Afebrile with no signs / symptoms of infection       PLAN:  1. Decrease Amlodipine  2. Add ACE I therapy  3. Rest of cardiac medications same.  Importance of compliance, especially with DAPT emphasized  4. Smoking cessation strongly encouraged  5. OK for discharge from cardiology stand point. Cardiology will sign off.  Please call if needed    Electronically signed by Jacquelin Lawrence MD on 10/28/2021 at 8:05 AM

## 2021-10-28 NOTE — PROGRESS NOTES
Subjective: The patient is awake and alert. No problems overnight. Denies chest pain, angina, and dyspnea. Denies abdominal pain. Tolerating diet. No nausea or vomiting. He is up in the bathroom, ambulating with no assistance. Objective:    /69   Pulse 76   Temp 98.7 °F (37.1 °C) (Temporal)   Resp 22   Ht 6' (1.829 m)   Wt (!) 382 lb (173.3 kg)   SpO2 98%   BMI 51.81 kg/m²   HEENT no adenopathy no bruits  Heart:  RRR, no murmurs, gallops, or rubs.   Lungs:  CTA bilaterally, no wheeze, rales or rhonchi  Abd: bowel sounds present, nontender, nondistended, no masses  Extrem:  No clubbing, cyanosis, +edema lower   WBC/Hgb/Hct/Plts:  13.4/14.1/42.1/211 (10/27 2773) basic metabolic panel     Assessment:    Patient Active Problem List   Diagnosis    Obstructive sleep apnea    Chronic kidney disease, stage II (mild)    Pure hypercholesterolemia    HTN (hypertension)    (HFpEF) heart failure with preserved ejection fraction (HCC)    TIA (transient ischemic attack)    Tobacco abuse    Coronary artery disease involving native coronary artery of native heart without angina pectoris    Chest pain    NSTEMI (non-ST elevated myocardial infarction) [facility])       Plan:  Per Cardiology team  Medication adjustments         Rhona Briones DO  7:41 AM  10/28/2021

## 2021-10-28 NOTE — ED PROVIDER NOTES
Dieudonne Chase is a 49 y.o. male with PMHx of HTN, T2DM, CAD s/p MOSES stenting x2 in 2019, HLD, BPH, fatty liver infiltration, prior NSTEMI, COPD, 3.1 x 3.1 cm infrarenal abdominal aortic aneurysm (last visualized 8/27/2010), and JARRET, who is presenting to the ED for chest pain, beginning approximately 10:00 this a.m. The complaint has been constant, severe in severity. The patient states that the pain is sharp and shooting, beginning in the midsternal region and radiating straight through to his back and left arm. He also endorses nausea and diaphoresis, but no vomiting. He states that he has been unable to take any of his morning meds D/T pain today.     ROS:   Unless otherwise stated in this report or unable to obtain because of the patient's clinical or mental status as evidenced by the medical record, this patients's positive and negative responses for Review of Systems, constitutional, psych, eyes, ENT, cardiovascular, respiratory, gastrointestinal, neurological, genitourinary, musculoskeletal, integument systems and systems related to the presenting problem are either stated in the preceding or were not pertinent or were negative for the symptoms and/or complaints related to the medical problem.     --------------------------------------------- PAST HISTORY ---------------------------------------------  Past Medical History:  has a past medical history of Accelerated hypertension, Arthritis, CAD (coronary artery disease), Cerebral artery occlusion with cerebral infarction ([facility] [address] CHF (congestive heart failure) ([facility] [address] COPD (chronic obstructive pulmonary disease) ([facility] [address] Depression with anxiety, Fibromyalgia, GI bleeding, Hyperlipidemia, Hypertension, Myocardial infarct [facility]), Sleep apnea, and Urinary frequency.     Past Surgical History:  has a past surgical history that includes Tonsillectomy; Colonoscopy (08/18/2017); Endoscopy, colon, diagnostic (08/18/2017); Cardiac catheterization (03/16/2017);  Cardiac catheterization (04/12/2018); pr [facility] incl fluor gdnce dx w/cell washg spx (N/A, 10/22/2018); pr laryngoscopy,dirct,op scope,biopsy (N/A, 11/1/2018); and Coronary angioplasty with stent (09/13/2019).    Social History:  reports that he has been smoking cigarettes. He has a 16.50 pack-year smoking history. He has never used smokeless tobacco. He reports current alcohol use of about 6.0 standard drinks of alcohol per week. He reports current drug use. Drug: Marijuana.     Family History: family history includes Heart Disease in his mother; No Known Problems in his brother, brother, sister, and sister; Other in his father.      The patients home medications have been reviewed.     Allergies: Pcn [penicillins]           ---------------------------------------------------PHYSICAL EXAM--------------------------------------     Constitutional/General: Alert and conversational, appears in moderate distress, non toxic. Head: Normocephalic and atraumatic  Eyes: EOMI, sclera anicteric, mucous membranes moist  Mouth: Oropharynx clear, handling secretions,  Neck: Full ROM, no stridor, no crepitus, no meningeal signs  Pulmonary: Lungs sounds diffusely diminished throughout, worse in bilateral bases, no wheezes, rales, or rhonchi. No increased work of breathing or respiratory distress  Cardiovascular:  Regular rate. Regular rhythm. No murmurs, gallops, or rubs. 2+ distal pulses. Chest: no chest wall tenderness to palpation  Abdomen: Soft. Non tender. Non distended. +BS. No guarding or rigidity. Musculoskeletal: Moves all extremities x 4. Warm and well perfused, no clubbing, cyanosis, or edema. Capillary refill <3 seconds  Skin: warm and dry. No rashes.    Neurologic: GCS 15, no focal deficits, moving all extremities symmetrically  Psych: Appears distressed, thought content behavior appropriate with normal affect     -------------------------------------------------- RESULTS Phosphatase 125 40 - 129 U/L     ALT 36 0 - 40 U/L     AST 37 0 - 39 U/L   Brain Natriuretic Peptide   Result Value Ref Range     Pro- (H) 0 - 125 pg/mL   Lipase   Result Value Ref Range     Lipase 53 13 - 60 U/L   SPECIMEN REJECTION   Result Value Ref Range     Rejected Test TROP       Reason for Rejection see below     Troponin   Result Value Ref Range     Troponin, High Sensitivity 264 (H) 0 - 11 ng/L   EKG 12 Lead   Result Value Ref Range     Ventricular Rate 76 BPM     Atrial Rate 76 BPM     P-R Interval 174 ms     QRS Duration 100 ms     Q-T Interval 400 ms     QTc Calculation (Bazett) 450 ms     P Axis 26 degrees     R Axis -37 degrees     T Axis 139 degrees      RADIOLOGY:  Interpreted by Radiologist.  XR CHEST PORTABLE   Final Result   Limited portable chest x-ray however the appearance of the chest is   suspicious for multifocal bilateral ground-glass infiltrates. CT of the   chest is recommended.           CTA PULMONARY W CONTRAST    (Results Pending)      EKG Interpretation  Interpreted by emergency department physician     Rhythm: normal sinus   Rate: normal  Axis: left  Conduction: left anterior fasciclar block  ST Segments: depression in  v2-v6,   T Waves: inversion in  v4, v5, v6, I and aVl  Comparison to prior EKG: no acute changes when compared to patient's most recent prior EKG      ------------------------- NURSING NOTES AND VITALS REVIEWED ---------------------------              The nursing notes within the ED encounter and vital signs as below have been reviewed by myself.   BP (!) 149/103   Pulse 70   Temp 96.6 °F (35.9 °C) (Infrared)   Resp 14   Ht 6' (1.829 m)   Wt (!) 382 lb (173.3 kg)   SpO2 96%   BMI 51.81 kg/m²   Oxygen Saturation Interpretation: Normal     The patients available past medical records and past encounters were reviewed.       ------------------------------ ED COURSE/MEDICAL DECISION MAKING----------------------  Medications   aspirin EC tablet 325 mg (325 mg Oral Not Given 10/26/21 1145)   nitroglycerin (NITRO-BID) 2 % ointment 1 inch (has no administration in time range)   heparin (porcine) injection 10,000 Units (has no administration in time range)   heparin (porcine) injection 10,000 Units (has no administration in time range)   heparin (porcine) injection 5,000 Units (has no administration in time range)   heparin 25,000 units in dextrose 5% 250 mL (premix) infusion (has no administration in time range)   nitroGLYCERIN 50 mg in dextrose 5% 250 mL infusion (has no administration in time range)   hydrALAZINE (APRESOLINE) 20 MG/ML injection (20 mg  Given 10/26/21 1144)   aluminum & magnesium hydroxide-simethicone (MAALOX) 30 mL, lidocaine viscous hcl (XYLOCAINE) 5 mL (GI COCKTAIL) ( Oral Given 10/26/21 1153)   labetalol (NORMODYNE;TRANDATE) injection 10 mg (10 mg IntraVENous Given 10/26/21 1210)   fentaNYL (SUBLIMAZE) injection 50 mcg (50 mcg IntraVENous Given 10/26/21 1216)   fentaNYL (SUBLIMAZE) injection 50 mcg (50 mcg IntraVENous Given 10/26/21 1338)   iopamidol (ISOVUE-370) 76 % injection 75 mL (75 mLs IntraVENous Given 10/26/21 1548)      Medical Decision Making:               Stef Gordon is a [id number]-DVF male with a PMHx of HTN, T2DM, CAD s/p MOSES stenting x2 in 2019, HLD, BPH, fatty liver infiltration, prior NSTEMI, COPD, 3.1 x 3.1 cm infrarenal abdominal aortic aneurysm (last visualized 8/27/2010), who presented to the ED on 10/26/2021 for chest pain which woke him from sleep at approximately 1000 this AM.  The patient describes the pain as sharp, midsternal, with radiation into the left arm. He was initially in hypertensive emergency, with a BP >200/100mmHg. EKG performed was concern for ischemic changes, namely ST segment depressions in leads V2 through V6 and T wave inversions in V4 - V6 and 1-aVL. The patient was given 325 mg of aspirin, and hydralazine and labetalol were given for BP control. IV fentanyl was given for pain control as well.  He was also started on heparin and nitroglycerin gtt. Upon BP reduction, patient's repeat EKG showed some improvement in ST-T wave changes. CTA was performed to R/O PE. Cardiology was consulted-per their recommendation, the patient will not be taken to Cath Lab. He is to be admitted for observation and further medical management/stabilization.     Re-Evaluations:             Re-evaluation. Patients symptoms show no change        Consultations:             Cardiology     This patient's ED course included: a personal history and physicial examination, multiple bedside re-evaluations, IV medications, cardiac monitoring and complex medical decision making and emergency management     This patient has been closely monitored during their ED course.       CRITICAL CARE TIME: 44 MINUTES     Counseling: The emergency provider has spoken with the patient and discussed todays results, in addition to providing specific details for the plan of care and counseling regarding the diagnosis and prognosis. Questions are answered at this time and they are agreeable with the plan.         --------------------------------- IMPRESSION AND DISPOSITION ---------------------------------     IMPRESSION  1. Chest pain, unspecified type    2. Hypertensive emergency          DISPOSITION  Disposition: Admit to telemetry  Patient condition is stable     NOTE: This report was transcribed using voice recognition software. Every effort was made to ensure accuracy; however, inadvertent computerized transcription errors may be present              Victor Hugo Puckett DO  Resident  10/26/21 1611    ATTENDING PROVIDER ATTESTATION:     Pepe Santos presented to the emergency department for evaluation of Chest Pain (woke pt up out of sleep, 10/10 radiating down right arm 324 aspirin given and 1 nitro no relief )   and was initially evaluated by the Medical Resident. See Original ED Note for H&P and ED course above.      I have reviewed and discussed the case, including pertinent history (medical, surgical, family and social) and exam findings with the Medical Resident assigned to [address] [place] [address]. I have personally performed and/or participated in the history, exam, medical decision making, and procedures and agree with all pertinent clinical information. I have reviewed my findings and recommendations with the assigned Medical Resident, [address] [place] [address] [address] and members of family present at the time of disposition. My findings/plan: The primary encounter diagnosis was Chest pain, unspecified type. A diagnosis of Hypertensive emergency was also pertinent to this visit.   Current Discharge Medication List        Maryjane Morales MD    CRITICAL CARE TIME: Vicky Mirza MD  10/28/21 0035

## 2021-10-29 NOTE — PROGRESS NOTES
CLINICAL PHARMACY NOTE: MEDS TO BEDS    Total # of Prescriptions Filled: 2   The following medications were delivered to the patient:  · Clopidogrel 75 mg  · Lisinopril 10 mg    Additional Documentation:  Delivered to patient samuel @10:20 on 10/29/21

## 2021-11-19 ENCOUNTER — HOSPITAL ENCOUNTER (OUTPATIENT)
Dept: DIABETES SERVICES | Age: 56
Setting detail: THERAPIES SERIES
Discharge: HOME | End: 2021-11-19
Payer: MEDICARE

## 2021-11-19 PROCEDURE — G0108 DIAB MANAGE TRN  PER INDIV: HCPCS | Performed by: DIETITIAN, REGISTERED

## 2021-11-19 SDOH — ECONOMIC STABILITY: FOOD INSECURITY: ADDITIONAL INFORMATION: NO

## 2021-11-19 ASSESSMENT — PROBLEM AREAS IN DIABETES QUESTIONNAIRE (PAID)
WORRYING ABOUT THE FUTURE AND THE POSSIBILITY OF SERIOUS COMPLICATIONS: 0
FEELING SCARED WHEN YOU THINK ABOUT LIVING WITH DIABETES: 0
PAID-5 TOTAL SCORE: 0
COPING WITH COMPLICATIONS OF DIABETES: 0
FEELING THAT DIABETES IS TAKING UP TOO MUCH OF YOUR MENTAL AND PHYSICAL ENERGY EVERY DAY: 0
FEELING DEPRESSED WHEN YOU THINK ABOUT LIVING WITH DIABETES: 0

## 2021-11-19 NOTE — PROGRESS NOTES
complications of diabetes. 1 [x] All       3 11/19/21 Recent heart attack. Prevention, detection & treatment of acute complications:    -State the causes,signs & symptoms of hyper & hypoglycemia, and prevention & treatment strategies.   -Describe sick day guidelines  DKA /indications for ketone testing &  when to call physician  1 [] All     []      []                  -Identify severe weather/situation crisis  & diabetes supplies management 1 []      Using medications safely:   -State effects of diabetes medicines on blood glucose levels;  -List diabetes medication taken, action & side effects 3     [x]  [x]  3 11/19/21 Ozempic 1 time a week, Metformin twice a month. Insulin/Injectables/glucagon  -Name appropriate injection sites; proper storage; supplies needed;  3   [x]  3     Demonstrate proper technique NA []      Monitoring blood glucose, interpreting and using results:   -Identify the purpose of testing   -Identify recommended & personal blood glucose targets & HgbA1C target levels  -State the Importance of logging blood glucose levels for pattern recognition;   -State benefits of reading/using pt generated health data  -Verbalize safe lancet disposal 1     [x]  [x]    []  []        []  3 11/19/21 Currently not testing on own meter.    -Demonstrate proper testing technique NA []      Incorporating physical activity into lifestyle:   -State effect of exercise on blood glucose levels;   -State benefits of regular exercise;   -Define safety considerations/food choices if needed.  -Describe contraindications/maintenance of activity. 2 [x] All       3    Incorporating nutritional management into lifestyle:   -Describe effect of type, amount & timing of food on blood glucose  -Describe methods for preparing and planning healthy meals  -Correctly read food labels  -Name 3 foods high in Carbohydrate 1 [x] All         4 11/19/21 Pt attended Session 2.  Participated in activities on Diabetes Plate Method and healthy food choices. Demonstrated understanding of carb counting using food lists with carbohydrate serving sizes. Read CHO content of food correctly on nutrition facts using various food labels . Questions answered. Followed along and took notes.   -Plan a carbohydrate-controlled meal based on individualized meal plan  -Demonstrate CHO counting/portion control  NA []  []      Developing strategies for problem solving to promote health/change behavior. -Identify 7 self-care behaviors; Personal health risk factors; Benefits, challenges & strategies for behavioral change and set an individualized goal selection. 1       [x]  4 11/19/21   [x]Nutrition-Reduce sugary drinks, follow 4 CHO per meal.  []Monitoring  []Exercise  []Medication  []Other     Identified Barriers to learning/adherence to self management plan:    Cognitive  []  other    Instruction Method:  Lecture/Discussion, Handouts and Return demonstration     Supporting Education Materials/Equipment Provided: Educational Binder and Nutritional Packet   []Vietnamese materials       [] services     []Other:      Encounter Type Date Attended Start Time End Time Comments No Show Dates   Assessment          Session 1         Session 2        1:1 DSMES  11/19/21 900 1000      In person Follow-up         Gestational Diabetes         Individual MNT        Meter Instrx        Insulin Instrx           Additional Comments: [x] Pt seen individually due to Covid-19 Safety precautions and no group session available.         Date:   Follow-up goal attainment based on patients initial DSMES goal     Notified by [] EMR []Fax        []Post class Hgb A1C  []Medication compliance   []Plate method/meal plan compliance   []Able to state the number of Carbohydrate servings eaten at B,L,D   []Testing blood glucose as prescribed by PCP   []Exercise Routine   []Other:   []Other:     []Patient lost to follow-up   Notified by []EMR []Fax     Personal Support Plan:      [x] Keep all scheduled doctor appointments   [] Make and keep appointments with specialists (foot, eye, dentist) as recommended   [] Consult my pharmacist about all new medications or to ask any medication questions   [] Get tested for sleep apnea   [] Seek help for:   [] Make an appointment with:   [] Attend smoking cessation classes or call 1-800-QUIT-NOW  [] Attend Diabetes Support Group   [] Use diabetes magazines, books, or credible web-sites like the ADA for more information  [x] Increase exercise at home or join an exercise program: Check into Cardiac rehab.

## 2021-12-20 ENCOUNTER — HOSPITAL ENCOUNTER (OUTPATIENT)
Dept: DIABETES SERVICES | Age: 56
Setting detail: THERAPIES SERIES
Discharge: HOME | End: 2021-12-20
Payer: MEDICARE

## 2021-12-20 PROCEDURE — G0108 DIAB MANAGE TRN  PER INDIV: HCPCS

## 2021-12-20 NOTE — LETTER
[facility]- Diabetes Education Department Initial Diabetes Education Letter    2021       Re:     Pepe Santos         :    Dear Dr. Bettyann Fabry,                    Thank you for referring your patient, Pepe Santos, for diabetes education. Pepe Santos has completed their comprehensive education plan today which included the topics below:    Nursing/Medical []      Nutrition [x]  [] Diabetes disease process & treatment   [] Diabetes medication use and safety   [] Self-monitoring blood glucose/interpreting results  [] Prevention, detection and treatment of acute complications  [] Prevention, detection and treatment of chronic complications  [] Developing strategies to address psychosocial issues    [] Nutritional management: basic principles   [] Carbohydrate counting, plate method, label reading  [] Benefits of/ways to incorporate physical activity  [] Problem solving and preparing for crisis situations  [] Diabetes supply management  [] Goal setting and behavior modification       In addition, we provided the following services. Instructed patient on 2400 calorie carbohydrate controlled meal plan:   8 oz lean protein, 10 servings fat, 18 carbohydrate choices/day: 6 choices breakfast, 5 choices lunch and dinner, 2 choices HS snack      PATIENT SELECTED GOAL:   [x]  I will follow my meal plan and measure my carbohydrate foods. []  I will increase my activity to:  []  I will check my blood glucose as ordered by my doctor. []  I will take my medications at the correct times as ordered by my doctor.   []  Other:      DIABETES SELF-MANAGEMENT SUPPORT PLAN/REFERRALS (patient identified):  [] Keep my scheduled visits with my doctor   [] Make and keep appointments with specialists (foot, eye, dentist) as recommended  [] Consult my pharmacist with all new medications and/or any medication questions  [] Get tested for sleep apnea  [] Seek help for:   [] Make an appointment with: [] Attend smoking cessation classes or call help-line (0-917-QUIT-NOW; 793.216.2960)  [] Attend a diabetes support group  [] Use diabetes magazines, books, or the American Diabetes Association website (www.diabetes. org) for more information    [] Start or increase exercising at home or join a program:  [] Other: There will be a follow-up in 3 months to evaluate the attainment of their chosen goal, and self identified support plan and you will be notified of their progress. Thank you for referring this patient to our program.  Please do not hesitate to call if you have any questions at [phone] [facility] or [facility]) or [phone] ([address] [place] [address]).         Sincerely,    Genesis Overton RD, LD    [facility]) Diabetes Education Department  American Diabetes Association Recognized DSMES Program

## 2021-12-20 NOTE — PROGRESS NOTES
complications of diabetes. 1 [x] All       3 11/19/21 Recent heart attack. Prevention, detection & treatment of acute complications:    -State the causes,signs & symptoms of hyper & hypoglycemia, and prevention & treatment strategies.   -Describe sick day guidelines  DKA /indications for ketone testing &  when to call physician  1 [] All     []      []                  -Identify severe weather/situation crisis  & diabetes supplies management 1 []      Using medications safely:   -State effects of diabetes medicines on blood glucose levels;  -List diabetes medication taken, action & side effects 3     [x]  [x]  3 11/19/21 Ozempic 1 time a week, Metformin twice a day. Insulin/Injectables/glucagon  -Name appropriate injection sites; proper storage; supplies needed;  3   [x]  3     Demonstrate proper technique NA []      Monitoring blood glucose, interpreting and using results:   -Identify the purpose of testing   -Identify recommended & personal blood glucose targets & HgbA1C target levels  -State the Importance of logging blood glucose levels for pattern recognition;   -State benefits of reading/using pt generated health data  -Verbalize safe lancet disposal 1     [x]  [x]    []  []        []  3 11/19/21 Currently not testing on own meter.    -Demonstrate proper testing technique NA []      Incorporating physical activity into lifestyle:   -State effect of exercise on blood glucose levels;   -State benefits of regular exercise;   -Define safety considerations/food choices if needed.  -Describe contraindications/maintenance of activity. 2 [x] All       3    Incorporating nutritional management into lifestyle:   -Describe effect of type, amount & timing of food on blood glucose  -Describe methods for preparing and planning healthy meals  -Correctly read food labels  -Name 3 foods high in Carbohydrate 1 [x] All         4 11/19/21 Pt attended Session 2.  Participated in activities on Diabetes Plate Method and healthy food choices. Demonstrated understanding of carb counting using food lists with carbohydrate serving sizes. Read CHO content of food correctly on nutrition facts using various food labels . Questions answered. Followed along and took notes.   -Plan a carbohydrate-controlled meal based on individualized meal plan  -Demonstrate CHO counting/portion control  1 [x]  [x]  4 12/20/21 CS  Instructed patient on 2400 calorie carbohydrate controlled meal plan:   8 oz lean protein, 10 servings fat, 18 carbohydrate choices/day: 6 choices breakfast, 5 choices lunch and dinner, 2 choices HS snack. Pt able to plan menu items using meal planning food lists. Developing strategies for problem solving to promote health/change behavior. -Identify 7 self-care behaviors; Personal health risk factors; Benefits, challenges & strategies for behavioral change and set an individualized goal selection. 1       [x]  4 11/19/21/  12/20/21  [x]Nutrition  []Monitoring  []Exercise  []Medication  []Other     Identified Barriers to learning/adherence to self management plan:    Cognitive  []  other    Instruction Method:  Lecture/Discussion, Handouts and Return demonstration     Supporting Education Materials/Equipment Provided: Educational Binder and Nutritional Packet   []Faroese materials       [] services     []Other:      Encounter Type Date Attended Start Time End Time Comments No Show Dates   Assessment          Session 1         Session 2 12/20/21 1000 1100     1:1 DSMES  11/19/21 900 1000      In person Follow-up         Gestational Diabetes         Individual MNT        Meter Instrx        Insulin Instrx           Additional Comments: [x] Pt seen individually due to Covid-19 Safety precautions and no group session available.         Date:   Follow-up goal attainment based on patients initial DSMES goal    Dr Notified by [] EMR []Fax        []Post class Hgb A1C  []Medication compliance   []Plate method/meal plan compliance   []Able to state the number of Carbohydrate servings eaten at B,L,D   []Testing blood glucose as prescribed by PCP   []Exercise Routine   []Other:   []Other:     []Patient lost to follow-up  Dr Notified by []EMR []Fax     Personal Support Plan:      [x] Keep all scheduled doctor appointments   [] Make and keep appointments with specialists (foot, eye, dentist) as recommended   [] Consult my pharmacist about all new medications or to ask any medication questions   [] Get tested for sleep apnea   [] Seek help for:   [] Make an appointment with:   [] Attend smoking cessation classes or call 1-800-QUIT-NOW  [] Attend Diabetes Support Group   [] Use diabetes magazines, books, or credible web-sites like the ADA for more information  [x] Increase exercise at home or join an exercise program: Check into Cardiac rehab.

## 2022-01-12 ENCOUNTER — OFFICE VISIT (OUTPATIENT)
Dept: FAMILY MEDICINE CLINIC | Age: 57
End: 2022-01-12
Payer: MEDICARE

## 2022-01-12 VITALS
OXYGEN SATURATION: 97 % | BODY MASS INDEX: 42.66 KG/M2 | RESPIRATION RATE: 19 BRPM | SYSTOLIC BLOOD PRESSURE: 142 MMHG | HEIGHT: 72 IN | HEART RATE: 77 BPM | DIASTOLIC BLOOD PRESSURE: 86 MMHG | TEMPERATURE: 97.5 F | WEIGHT: 315 LBS

## 2022-01-12 DIAGNOSIS — J44.1 COPD EXACERBATION (HCC): ICD-10-CM

## 2022-01-12 DIAGNOSIS — R05.9 COUGH: Primary | ICD-10-CM

## 2022-01-12 LAB
Lab: NORMAL
PERFORMING INSTRUMENT: NORMAL
QC PASS/FAIL: NORMAL
SARS-COV-2, POC: NORMAL

## 2022-01-12 PROCEDURE — 87426 SARSCOV CORONAVIRUS AG IA: CPT | Performed by: STUDENT IN AN ORGANIZED HEALTH CARE EDUCATION/TRAINING PROGRAM

## 2022-01-12 PROCEDURE — 99212 OFFICE O/P EST SF 10 MIN: CPT | Performed by: STUDENT IN AN ORGANIZED HEALTH CARE EDUCATION/TRAINING PROGRAM

## 2022-01-12 RX ORDER — AZITHROMYCIN 250 MG/1
250 TABLET, FILM COATED ORAL SEE ADMIN INSTRUCTIONS
Qty: 6 TABLET | Refills: 0 | Status: SHIPPED | OUTPATIENT
Start: 2022-01-12 | End: 2022-01-17

## 2022-01-12 RX ORDER — PREDNISONE 20 MG/1
40 TABLET ORAL DAILY
Qty: 10 TABLET | Refills: 0 | Status: SHIPPED | OUTPATIENT
Start: 2022-01-12 | End: 2022-01-17

## 2022-01-12 NOTE — PROGRESS NOTES
Chief Complaint       Shortness of Breath (x few days / covid vaccinated ) and Cough      History of Present Illness   Source of history provided by:  patient. Jeannine Figueroa is a 49 y.o. old male presenting to the walk in clinic for evaluation of nasal congestion, increased cough, sputum production and shortness of breath for the past 3 days. Has been using his rescue inhaler more- now 4 times a day. Denies any fever, chills, wheezing, CP,  or GI symptoms. Does have a history of COPD. Denies any contact with any individuals with known COVID-19 infection or under investigation for COVID-19 infection. Pt has been vaccinated for COVID-19. ROS    Unless otherwise stated in this report or unable to obtain because of the patient's clinical or mental status as evidenced by the medical record, this patients's positive and negative responses for Review of Systems, constitutional, psych, eyes, ENT, cardiovascular, respiratory, gastrointestinal, neurological, genitourinary, musculoskeletal, integument systems and systems related to the presenting problem are either stated in the preceding or were not pertinent or were negative for the symptoms and/or complaints related to the medical problem. Past Medical History:  has a past medical history of Accelerated hypertension, Arthritis, CAD (coronary artery disease), Cerebral artery occlusion with cerebral infarction ([facility] [address] CHF (congestive heart failure) ([facility] [address] COPD (chronic obstructive pulmonary disease) ([facility] [address] Depression with anxiety, Fibromyalgia, GI bleeding, Hyperlipidemia, Hypertension, Myocardial infarct [facility]), Sleep apnea, and Urinary frequency. Past Surgical History:  has a past surgical history that includes Tonsillectomy; Colonoscopy (08/18/2017); Endoscopy, colon, diagnostic (08/18/2017); Cardiac catheterization (03/16/2017);  Cardiac catheterization (04/12/2018); [facility] w/cell washg spx (N/A, 10/22/2018); pr laryngoscopy,dirct,op scope,biopsy (N/A, 11/1/2018); Coronary angioplasty with stent (09/13/2019); and Coronary angioplasty with stent (10/27/2021). Social History:  reports that he has been smoking cigarettes. He has a 16.50 pack-year smoking history. He has never used smokeless tobacco. He reports current alcohol use of about 6.0 standard drinks of alcohol per week. He reports current drug use. Drug: Marijuana Brendan Rodriguez). Family History: family history includes Heart Disease in his mother; No Known Problems in his brother, brother, sister, and sister; Other in his father. Allergies: Pcn [penicillins]    Physical Exam         VS:  BP (!) 142/86   Pulse 77   Temp 97.5 °F (36.4 °C) (Temporal)   Resp 19   Ht 6' (1.829 m)   Wt (!) 370 lb (167.8 kg)   SpO2 97%   BMI 50.18 kg/m²    Oxygen Saturation Interpretation: Normal.    Constitutional:  Alert, development consistent with age. Ears:  External Ears: Bilateral pinna normal. TMs  without erythema or perforation bilaterally. Canals normal bilaterally without swelling or exudate  Nose:   congestion of the nasal mucosa. There is  injection to middle turbinates bilaterally. Throat:  posterior pharyngeal erythema with mild post nasal drip present. No exudate or tonsillar hypertrophy noted. Neck:  Supple. There is no anterior cervical adenopathy. Lungs: CTAB without wheezes, rales, or rhonchi  Heart:  Regular rate and rhythm, normal heart sounds, without pathological murmurs, ectopy, gallops, or rubs. Skin:  Normal turgor. Warm, dry, without visible rash. Neurological:  Alert and oriented. Motor functions intact. Responds to verbal commands.      Lab / Imaging Results   (All laboratory and radiology results have been personally reviewed by myself)  Labs:  Results for orders placed or performed in visit on 01/12/22   POCT COVID-19, Antigen   Result Value Ref Range    SARS-COV-2, POC Not-Detected Not Detected    Lot Number [id number]     QC Pass/Fail pass Performing Instrument BD Veritor        Imaging: All Radiology results interpreted by Radiologist unless otherwise noted. Assessment / Plan     Impression(s):  Meet Rhodes was seen today for shortness of breath and cough. Diagnoses and all orders for this visit:    Cough  -     POCT COVID-19, Antigen    COPD exacerbation (HCC)  -     azithromycin (ZITHROMAX) 250 MG tablet; Take 1 tablet by mouth See Admin Instructions for 5 days 500mg on day 1 followed by 250mg on days 2 - 5  -     predniSONE (DELTASONE) 20 MG tablet; Take 2 tablets by mouth daily for 5 days    COVID testing in office is negative    Disposition:  Disposition: Discharge to home    Increase fluids and rest. Script written for above side effects discussed. Additional symptomatic relief discussed. PCP in 5-7 days if symptoms persist. ED sooner if symptoms worsen or change. Red flag symptoms discussed. Pt is in agreement with this care plan. All questions answered.     Rylee Parikh MD

## 2022-01-31 ENCOUNTER — OFFICE VISIT (OUTPATIENT)
Dept: FAMILY MEDICINE CLINIC | Age: 57
End: 2022-01-31
Payer: MEDICARE

## 2022-01-31 VITALS
HEART RATE: 74 BPM | DIASTOLIC BLOOD PRESSURE: 86 MMHG | OXYGEN SATURATION: 96 % | WEIGHT: 315 LBS | HEIGHT: 72 IN | RESPIRATION RATE: 16 BRPM | BODY MASS INDEX: 42.66 KG/M2 | SYSTOLIC BLOOD PRESSURE: 138 MMHG | TEMPERATURE: 98.7 F

## 2022-01-31 DIAGNOSIS — R06.02 SHORTNESS OF BREATH: ICD-10-CM

## 2022-01-31 DIAGNOSIS — R05.9 COUGH: Primary | ICD-10-CM

## 2022-01-31 LAB
Lab: NORMAL
PERFORMING INSTRUMENT: NORMAL
QC PASS/FAIL: NORMAL
SARS-COV-2, POC: NORMAL

## 2022-01-31 PROCEDURE — 99213 OFFICE O/P EST LOW 20 MIN: CPT | Performed by: STUDENT IN AN ORGANIZED HEALTH CARE EDUCATION/TRAINING PROGRAM

## 2022-01-31 PROCEDURE — 87426 SARSCOV CORONAVIRUS AG IA: CPT | Performed by: STUDENT IN AN ORGANIZED HEALTH CARE EDUCATION/TRAINING PROGRAM

## 2022-01-31 RX ORDER — BLOOD-GLUCOSE METER
EACH MISCELLANEOUS
COMMUNITY
Start: 2021-11-16 | End: 2022-04-14 | Stop reason: CLARIF

## 2022-01-31 RX ORDER — AZITHROMYCIN 250 MG/1
250 TABLET, FILM COATED ORAL SEE ADMIN INSTRUCTIONS
Qty: 6 TABLET | Refills: 0 | Status: SHIPPED | OUTPATIENT
Start: 2022-01-31 | End: 2022-02-05

## 2022-01-31 RX ORDER — BENZONATATE 100 MG/1
100 CAPSULE ORAL 2 TIMES DAILY PRN
Qty: 20 CAPSULE | Refills: 0 | Status: SHIPPED | OUTPATIENT
Start: 2022-01-31 | End: 2022-02-07

## 2022-01-31 RX ORDER — LANCETS 33 GAUGE
EACH MISCELLANEOUS
COMMUNITY
Start: 2021-11-16 | End: 2022-04-14 | Stop reason: CLARIF

## 2022-01-31 RX ORDER — METHYLPREDNISOLONE 4 MG/1
TABLET ORAL
Qty: 1 KIT | Refills: 0 | Status: SHIPPED
Start: 2022-01-31 | End: 2022-04-14 | Stop reason: CLARIF

## 2022-01-31 RX ORDER — ATORVASTATIN CALCIUM 40 MG/1
TABLET, FILM COATED ORAL
COMMUNITY
Start: 2021-11-05

## 2022-01-31 RX ORDER — BLOOD SUGAR DIAGNOSTIC
STRIP MISCELLANEOUS
COMMUNITY
Start: 2021-11-16 | End: 2022-04-14 | Stop reason: CLARIF

## 2022-01-31 NOTE — PROGRESS NOTES
22  Omari Busch :  Sex: male  Age 49 y.o. Subjective:  Chief Complaint   Patient presents with    Concern For COVID-19     Headache and cough Sysmptoms started Friday Night with headaches and also congestion        HPI:   Omari Busch , 49 y.o. male presents to the clinic for evaluation of cough congestion headache fatigue and minimal left ear pain  x 2-3 days. The patient has taken tylenol excedrine mgiraine for symptoms. The patient reports no known ill exposure. The patient denies acute loss of taste or smell, sore throat, rash. The patient denies body aches, chills, fever. The patient also denies chest pain, abdominal pain, wheezing, and nausea / vomiting / diarrhea. He is SOB but this is his normal     ROS:   Unless otherwise stated in this report the patient's positive and negative responses for review of systems for constitutional, eyes, ENT, cardiovascular, respiratory, gastrointestinal, neurological, , musculoskeletal, and integument systems and related systems to the presenting problem are either stated in the history of present illness or were not pertinent or were negative for the symptoms and/or complaints related to the presenting medical problem. Positives and pertinent negatives as per HPI. All others reviewed and are negative.       PMH:     Past Medical History:   Diagnosis Date    Accelerated hypertension 2017    Arthritis     CAD (coronary artery disease)     Cerebral artery occlusion with cerebral infarction ([address]     TIA    CHF (congestive heart failure) (HCC)     COPD (chronic obstructive pulmonary disease) ([address]     Depression with anxiety     Fibromyalgia     GI bleeding     Hyperlipidemia     Hypertension     Myocardial infarct ([facility] [address]     in past, per testing, date unknown    Sleep apnea     cpap    Urinary frequency        Past Surgical History:   Procedure Laterality Date    CARDIAC CATHETERIZATION  2017    Dr Mann Langley CARDIAC CATHETERIZATION  04/12/2018    Dr. Maddie Sibley COLONOSCOPY  08/18/2017    CORONARY ANGIOPLASTY WITH STENT PLACEMENT  09/13/2019    rca distal 4.5x 30    CORONARY ANGIOPLASTY WITH STENT PLACEMENT  10/27/2021    [facility]  3.0 x 22 proximal LAD by DR Danisha Mejia    ENDOSCOPY, COLON, DIAGNOSTIC  08/18/2017    [address] INCL FLUOR GDNCE DX W/CELL WASHG SPX N/A 10/22/2018    DIRECT LARYNGOSCOPY performed by Carlos Barba DO at [facility] OR    NE LARYNGOSCOPY,DIRCT,OP SCOPE,BIOPSY N/A 11/1/2018    MICRO LARYNGOSCOPY WITH BIOPSY TRUE VOCAL CORD LESION performed by Azul Murrieta DO at [facility] [place]    TONSILLECTOMY         Family History   Problem Relation Age of Onset    Heart Disease Mother     Other Father    [facility] No Known Problems Sister     No Known Problems Brother     No Known Problems Sister     No Known Problems Brother        Medications:     Current Outpatient Medications:     atorvastatin (LIPITOR) 40 MG tablet, TAKE ONE TABLET BY MOUTH EVERY DAY, Disp: , Rfl:     Blood Glucose Monitoring Suppl (ONE TOUCH ULTRA 2) w/Device KIT, TEST TWICE A DAY, Disp: , Rfl:     Lancets (ONETOUCH DELICA PLUS [id number]) MISC, USE TO TEST TWICE DAILY. , Disp: , Rfl:     ONETOUCH ULTRA strip, USE TO TEST TWICE A DAY, Disp: , Rfl:     azithromycin (ZITHROMAX) 250 MG tablet, Take 1 tablet by mouth See Admin Instructions for 5 days 500mg on day 1 followed by 250mg on days 2 - 5, Disp: 6 tablet, Rfl: 0    benzonatate (TESSALON) 100 MG capsule, Take 1 capsule by mouth 2 times daily as needed for Cough, Disp: 20 capsule, Rfl: 0    methylPREDNISolone (MEDROL DOSEPACK) 4 MG tablet, Take by mouth., Disp: 1 kit, Rfl: 0    lisinopril (PRINIVIL;ZESTRIL) 10 MG tablet, Take 1 tablet by mouth daily, Disp: 30 tablet, Rfl: 3    clopidogrel (PLAVIX) 75 MG tablet, Take 1 tablet by mouth daily, Disp: 30 tablet, Rfl: 3    metFORMIN (GLUCOPHAGE) 1000 MG tablet, Take 1,000 mg by mouth 2 times daily (with meals), Disp: , Rfl:     albuterol sulfate HFA (VENTOLIN HFA) 108 (90 Base) MCG/ACT inhaler, Inhale 2 puffs into the lungs 4 times daily as needed for Wheezing, Disp: 1 Inhaler, Rfl: 0    QVAR REDIHALER 80 MCG/ACT AERB inhaler, INHALE ONE PUFF BY MOUTH EVERY 12 HOURS WITH SPACER, Disp: , Rfl:     furosemide (LASIX) 40 MG tablet, TAKE ONE TABLET BY MOUTH EVERY DAY, Disp: , Rfl:     traZODone (DESYREL) 50 MG tablet, TAKE TWO TABLETS BY MOUTH EVERY DAY, Disp: , Rfl:     amLODIPine (NORVASC) 10 MG tablet, Take 1 tablet by mouth daily, Disp: 90 tablet, Rfl: 5    budesonide-formoterol (SYMBICORT) 160-4.5 MCG/ACT AERO, Inhale 2 puffs into the lungs 2 times daily, Disp: 1 Inhaler, Rfl: 3    metoprolol succinate (TOPROL XL) 50 MG extended release tablet, Take 1 tablet by mouth 2 times daily, Disp: 60 tablet, Rfl: 3    aspirin 325 MG EC tablet, Take 1 tablet by mouth once for 1 dose, Disp: 30 tablet, Rfl: 3    Allergies: Allergies   Allergen Reactions    Pcn [Penicillins] Other (See Comments)     As a child, does not remembered       Social History:     Social History     Tobacco Use    Smoking status: Current Every Day Smoker     Packs/day: 0.50     Years: 33.00     Pack years: 16.50     Types: Cigarettes    Smokeless tobacco: Never Used   Vaping Use    Vaping Use: Never used   Substance Use Topics    Alcohol use: Yes     Alcohol/week: 6.0 standard drinks     Types: 6 Cans of beer per week     Comment: social    Drug use: Yes     Types: Marijuana Fredick Copping)     Comment: once a week         Physical Exam:     Vitals:    01/31/22 1517   BP: 138/86   Pulse: 74   Resp: 16   Temp: 98.7 °F (37.1 °C)   TempSrc: Temporal   SpO2: 96%   Weight: (!) 382 lb (173.3 kg)   Height: 6' (1.829 m)       Physical Exam (PE)    Physical Exam  Vitals and nursing note reviewed. Constitutional:       Appearance: Normal appearance. He is obese. HENT:      Head: Normocephalic and atraumatic.       Right Ear: Tympanic membrane, ear canal and external ear normal.      Left Ear: Tympanic membrane, ear canal and external ear normal.      Nose: Congestion and rhinorrhea present. Mouth/Throat:      Mouth: Mucous membranes are moist.      Pharynx: Posterior oropharyngeal erythema present. Eyes:      Extraocular Movements: Extraocular movements intact. Pupils: Pupils are equal, round, and reactive to light. Cardiovascular:      Rate and Rhythm: Normal rate and regular rhythm. Pulses: Normal pulses. Heart sounds: Normal heart sounds. Pulmonary:      Effort: Pulmonary effort is normal.      Breath sounds: Normal breath sounds. Abdominal:      General: Bowel sounds are normal.      Palpations: Abdomen is soft. Musculoskeletal:         General: Normal range of motion. Cervical back: Normal range of motion and neck supple. Skin:     General: Skin is warm and dry. Capillary Refill: Capillary refill takes less than 2 seconds. Neurological:      General: No focal deficit present. Mental Status: He is alert and oriented to person, place, and time. Psychiatric:         Mood and Affect: Mood normal.         Behavior: Behavior normal.         Thought Content: Thought content normal.          Testing:   (All laboratory and radiology results have been personally reviewed by myself)  Labs:  Results for orders placed or performed in visit on 01/31/22   POCT COVID-19, Antigen   Result Value Ref Range    SARS-COV-2, POC Not-Detected Not Detected    Lot Number [id number]     QC Pass/Fail pass     Performing Instrument BD Veritor        Imaging: All Radiology results interpreted by Radiologist unless otherwise noted. No orders to display       Assessment / Plan:   The patient's vitals, allergies, medications, and past medical history have been reviewed. Paris Mariano was seen today for concern for covid-19.     Diagnoses and all orders for this visit:    Cough  -     POCT COVID-19, Antigen  -     azithromycin (ZITHROMAX) 250 MG tablet; Take 1 tablet by mouth See Admin Instructions for 5 days 500mg on day 1 followed by 250mg on days 2 - 5  -     benzonatate (TESSALON) 100 MG capsule; Take 1 capsule by mouth 2 times daily as needed for Cough  -     methylPREDNISolone (MEDROL DOSEPACK) 4 MG tablet; Take by mouth. Shortness of breath  -     methylPREDNISolone (MEDROL DOSEPACK) 4 MG tablet; Take by mouth. Patient declined covid PCR     - Patient is directed to take the prescribed medication as ordered. Discussed taking vitamin D, vitamin C, and zinc supplementation.     - Advised to follow current CDC guidelines. Increase fluids and rest. Symptomatic relief discussed including Tylenol prn pain/fever. Schedule virtual f/u with PCP in 2-3 days. Red flag symptoms were discussed with the patient today. The patient is directed to go the ED if symptoms worsen or change. Pt verbalizes understanding and is in agreement with plan of care. All questions answered.     SIGNATURE: Tonia Crane DO

## 2022-02-25 ENCOUNTER — FOLLOWUP TELEPHONE ENCOUNTER (OUTPATIENT)
Dept: DIABETES SERVICES | Age: 57
End: 2022-02-25

## 2022-02-25 NOTE — PROGRESS NOTES
Diabetes Self-Management Education Record    Participant Name: Sam Miles  Referring Provider: Zandra Boone MD  Assessment/Evaluation Ratings:  1=Needs Instruction   4=Demonstrates Understanding/Competency  2=Needs Review   NC=Not Covered    3=Comprehends Key Points  N/A=Not Applicable  Topics/Learning Objectives Pre-session Assess Date:  Instructor initials/date  [facility]/11/19/21 Instr. Date    Instructor initials/date  [facility]/11/19/21 Follow-up Post- session Eval Comments   Diabetes disease process & Treatment process:   -Define type of diabetes in simple terms.  - Describe the ABCs of  diabetes management  -Identify own type of diabetes  -Identify lifestyle changes/treatment options  -other:  1 [x] All       3 11/19/21 Newly dx    Developing strategies for Healthy coping/psychosocial issues:    -Describe feelings about living with diabetes  -Identify coping strategies and sources of stress  -Identify support needed & support network available  -Complete PAID-5 Diabetes questionnaire 1 [x] All       4 22/97/33 [place] Almita Saliva completed a Diabetes Self- Management Education Assessment on 11/19/21. Part of our assessment is having the patient complete the PAID (Problem Areas in Diabetes Scale)-5 survey. This tool  measures diabetes-related emotional distress a patient may be feeling. Sam Miles scored _9_   A total score of >8 indicates possible diabetes related emotional distress, which warrants further assessment and a referral to mental health professional for psychological support and treatment.            Prevention, detection & treatment of Chronic complications:    -Identify the prevention, detection and treatment for complications including immunizations, preventive eye, foot, dental and renal exams as indicated per the participant's duration of diabetes and health status.  -Define the natural course of diabetes and the relationship of blood glucose levels to long term complications of diabetes. 1 [x] All       3 11/19/21 Recent heart attack. Prevention, detection & treatment of acute complications:    -State the causes,signs & symptoms of hyper & hypoglycemia, and prevention & treatment strategies.   -Describe sick day guidelines  DKA /indications for ketone testing &  when to call physician  1 [] All     []      []                  -Identify severe weather/situation crisis  & diabetes supplies management 1 []      Using medications safely:   -State effects of diabetes medicines on blood glucose levels;  -List diabetes medication taken, action & side effects 3     [x]  [x]  3 11/19/21 Ozempic 1 time a week, Metformin twice a day. Insulin/Injectables/glucagon  -Name appropriate injection sites; proper storage; supplies needed;  3   [x]  3     Demonstrate proper technique NA []      Monitoring blood glucose, interpreting and using results:   -Identify the purpose of testing   -Identify recommended & personal blood glucose targets & HgbA1C target levels  -State the Importance of logging blood glucose levels for pattern recognition;   -State benefits of reading/using pt generated health data  -Verbalize safe lancet disposal 1     [x]  [x]    []  []        []  3 11/19/21 Currently not testing on own meter.    -Demonstrate proper testing technique NA []      Incorporating physical activity into lifestyle:   -State effect of exercise on blood glucose levels;   -State benefits of regular exercise;   -Define safety considerations/food choices if needed.  -Describe contraindications/maintenance of activity. 2 [x] All       3    Incorporating nutritional management into lifestyle:   -Describe effect of type, amount & timing of food on blood glucose  -Describe methods for preparing and planning healthy meals  -Correctly read food labels  -Name 3 foods high in Carbohydrate 1 [x] All         4 11/19/21 Pt attended Session 2.  Participated in activities on Diabetes Plate Method and healthy food choices. Demonstrated understanding of carb counting using food lists with carbohydrate serving sizes. Read CHO content of food correctly on nutrition facts using various food labels . Questions answered. Followed along and took notes.   -Plan a carbohydrate-controlled meal based on individualized meal plan  -Demonstrate CHO counting/portion control  1 [x]  [x]  4 12/20/21 CS  Instructed patient on 2400 calorie carbohydrate controlled meal plan:   8 oz lean protein, 10 servings fat, 18 carbohydrate choices/day: 6 choices breakfast, 5 choices lunch and dinner, 2 choices HS snack. Pt able to plan menu items using meal planning food lists. Developing strategies for problem solving to promote health/change behavior. -Identify 7 self-care behaviors; Personal health risk factors; Benefits, challenges & strategies for behavioral change and set an individualized goal selection. 1       [x]  4 11/19/21/  12/20/21  [x]Nutrition  []Monitoring  []Exercise  []Medication  []Other     Identified Barriers to learning/adherence to self management plan:    Cognitive  []  other    Instruction Method:  Lecture/Discussion, Handouts and Return demonstration     Supporting Education Materials/Equipment Provided: Educational Binder and Nutritional Packet   []Bengali materials       [] services     []Other:      Encounter Type Date Attended Start Time End Time Comments No Show Dates   Assessment          Session 1         Session 2 12/20/21 1000 1100     1:1 DSMES  11/19/21 900 1000      In person Follow-up         Gestational Diabetes         Individual MNT        Meter Instrx        Insulin Instrx           Additional Comments: [x] Pt seen individually due to Covid-19 Safety precautions and no group session available.         Date:   Follow-up goal attainment based on patients initial DSMES goal    Dr Notified by [] EMR []Fax        []Post class Hgb A1C  []Medication compliance   []Plate method/meal plan compliance   []Able to state the number of Carbohydrate servings eaten at B,L,D   []Testing blood glucose as prescribed by PCP   []Exercise Routine   []Other:   []Other:     [x]Patient lost to follow-up  Dr Notified by [x]EMR []Fax     Personal Support Plan:      [x] Keep all scheduled doctor appointments   [] Make and keep appointments with specialists (foot, eye, dentist) as recommended   [] Consult my pharmacist about all new medications or to ask any medication questions   [] Get tested for sleep apnea   [] Seek help for:   [] Make an appointment with:   [] Attend smoking cessation classes or call 1-800-QUIT-NOW  [] Attend Diabetes Support Group   [] Use diabetes magazines, books, or credible web-sites like the ADA for more information  [x] Increase exercise at home or join an exercise program: Check into Cardiac rehab.

## 2022-03-05 ENCOUNTER — APPOINTMENT (OUTPATIENT)
Dept: GENERAL RADIOLOGY | Age: 57
DRG: 293 | End: 2022-03-05
Payer: MEDICARE

## 2022-03-05 ENCOUNTER — HOSPITAL ENCOUNTER (INPATIENT)
Age: 57
LOS: 1 days | Discharge: LEFT BEFORE DISCHARGE | DRG: 293 | End: 2022-03-05
Attending: EMERGENCY MEDICINE | Admitting: INTERNAL MEDICINE
Payer: MEDICARE

## 2022-03-05 VITALS
WEIGHT: 315 LBS | SYSTOLIC BLOOD PRESSURE: 158 MMHG | RESPIRATION RATE: 20 BRPM | HEART RATE: 73 BPM | BODY MASS INDEX: 40.43 KG/M2 | DIASTOLIC BLOOD PRESSURE: 110 MMHG | TEMPERATURE: 98.1 F | OXYGEN SATURATION: 95 % | HEIGHT: 74 IN

## 2022-03-05 DIAGNOSIS — I50.9 ACUTE ON CHRONIC CONGESTIVE HEART FAILURE, UNSPECIFIED HEART FAILURE TYPE (HCC): Primary | ICD-10-CM

## 2022-03-05 LAB
ALBUMIN SERPL-MCNC: 4.1 G/DL (ref 3.5–5.2)
ALP BLD-CCNC: 94 U/L (ref 40–129)
ALT SERPL-CCNC: 20 U/L (ref 0–40)
ANION GAP SERPL CALCULATED.3IONS-SCNC: 13 MMOL/L (ref 7–16)
AST SERPL-CCNC: 22 U/L (ref 0–39)
BACTERIA: ABNORMAL /HPF
BASOPHILS ABSOLUTE: 0.03 E9/L (ref 0–0.2)
BASOPHILS RELATIVE PERCENT: 0.4 % (ref 0–2)
BILIRUB SERPL-MCNC: 0.5 MG/DL (ref 0–1.2)
BILIRUBIN URINE: NEGATIVE
BLOOD, URINE: NEGATIVE
BUN BLDV-MCNC: 15 MG/DL (ref 6–20)
CALCIUM SERPL-MCNC: 8.9 MG/DL (ref 8.6–10.2)
CHLORIDE BLD-SCNC: 103 MMOL/L (ref 98–107)
CLARITY: CLEAR
CO2: 23 MMOL/L (ref 22–29)
COLOR: YELLOW
CREAT SERPL-MCNC: 1 MG/DL (ref 0.7–1.2)
EOSINOPHILS ABSOLUTE: 0.2 E9/L (ref 0.05–0.5)
EOSINOPHILS RELATIVE PERCENT: 2.9 % (ref 0–6)
EPITHELIAL CELLS, UA: ABNORMAL /HPF
GFR AFRICAN AMERICAN: >60
GFR NON-AFRICAN AMERICAN: >60 ML/MIN/1.73
GLUCOSE BLD-MCNC: 163 MG/DL (ref 74–99)
GLUCOSE URINE: NEGATIVE MG/DL
HCT VFR BLD CALC: 41.6 % (ref 37–54)
HEMOGLOBIN: 13.7 G/DL (ref 12.5–16.5)
IMMATURE GRANULOCYTES #: 0.04 E9/L
IMMATURE GRANULOCYTES %: 0.6 % (ref 0–5)
KETONES, URINE: NEGATIVE MG/DL
LEUKOCYTE ESTERASE, URINE: ABNORMAL
LIPASE: 35 U/L (ref 13–60)
LYMPHOCYTES ABSOLUTE: 1.73 E9/L (ref 1.5–4)
LYMPHOCYTES RELATIVE PERCENT: 25.3 % (ref 20–42)
MCH RBC QN AUTO: 31.4 PG (ref 26–35)
MCHC RBC AUTO-ENTMCNC: 32.9 % (ref 32–34.5)
MCV RBC AUTO: 95.4 FL (ref 80–99.9)
METER GLUCOSE: 137 MG/DL (ref 74–99)
MONOCYTES ABSOLUTE: 0.38 E9/L (ref 0.1–0.95)
MONOCYTES RELATIVE PERCENT: 5.5 % (ref 2–12)
NEUTROPHILS ABSOLUTE: 4.47 E9/L (ref 1.8–7.3)
NEUTROPHILS RELATIVE PERCENT: 65.3 % (ref 43–80)
NITRITE, URINE: NEGATIVE
PDW BLD-RTO: 13.8 FL (ref 11.5–15)
PH UA: 6 (ref 5–9)
PLATELET # BLD: 199 E9/L (ref 130–450)
PMV BLD AUTO: 9 FL (ref 7–12)
POTASSIUM SERPL-SCNC: 4 MMOL/L (ref 3.5–5)
PRO-BNP: 1896 PG/ML (ref 0–125)
PROTEIN UA: ABNORMAL MG/DL
RBC # BLD: 4.36 E12/L (ref 3.8–5.8)
RBC UA: ABNORMAL /HPF (ref 0–2)
REASON FOR REJECTION: NORMAL
REJECTED TEST: NORMAL
SARS-COV-2, NAAT: NOT DETECTED
SODIUM BLD-SCNC: 139 MMOL/L (ref 132–146)
SPECIFIC GRAVITY UA: 1.02 (ref 1–1.03)
TOTAL PROTEIN: 7 G/DL (ref 6.4–8.3)
TROPONIN, HIGH SENSITIVITY: 43 NG/L (ref 0–11)
TROPONIN, HIGH SENSITIVITY: 44 NG/L (ref 0–11)
UROBILINOGEN, URINE: 0.2 E.U./DL
WBC # BLD: 6.9 E9/L (ref 4.5–11.5)
WBC UA: ABNORMAL /HPF (ref 0–5)

## 2022-03-05 PROCEDURE — 36415 COLL VENOUS BLD VENIPUNCTURE: CPT

## 2022-03-05 PROCEDURE — 82962 GLUCOSE BLOOD TEST: CPT

## 2022-03-05 PROCEDURE — 99284 EMERGENCY DEPT VISIT MOD MDM: CPT

## 2022-03-05 PROCEDURE — 83690 ASSAY OF LIPASE: CPT

## 2022-03-05 PROCEDURE — 6370000000 HC RX 637 (ALT 250 FOR IP)

## 2022-03-05 PROCEDURE — 84484 ASSAY OF TROPONIN QUANT: CPT

## 2022-03-05 PROCEDURE — 71045 X-RAY EXAM CHEST 1 VIEW: CPT

## 2022-03-05 PROCEDURE — 93005 ELECTROCARDIOGRAM TRACING: CPT | Performed by: NURSE PRACTITIONER

## 2022-03-05 PROCEDURE — 2060000000 HC ICU INTERMEDIATE R&B

## 2022-03-05 PROCEDURE — 85025 COMPLETE CBC W/AUTO DIFF WBC: CPT

## 2022-03-05 PROCEDURE — 83880 ASSAY OF NATRIURETIC PEPTIDE: CPT

## 2022-03-05 PROCEDURE — 96374 THER/PROPH/DIAG INJ IV PUSH: CPT

## 2022-03-05 PROCEDURE — 87635 SARS-COV-2 COVID-19 AMP PRB: CPT

## 2022-03-05 PROCEDURE — 81001 URINALYSIS AUTO W/SCOPE: CPT

## 2022-03-05 PROCEDURE — 80053 COMPREHEN METABOLIC PANEL: CPT

## 2022-03-05 PROCEDURE — 6360000002 HC RX W HCPCS: Performed by: EMERGENCY MEDICINE

## 2022-03-05 RX ORDER — ALBUTEROL SULFATE 2.5 MG/3ML
2.5 SOLUTION RESPIRATORY (INHALATION) 4 TIMES DAILY PRN
Status: DISCONTINUED | OUTPATIENT
Start: 2022-03-05 | End: 2022-03-05 | Stop reason: HOSPADM

## 2022-03-05 RX ORDER — ATORVASTATIN CALCIUM 40 MG/1
40 TABLET, FILM COATED ORAL DAILY
Status: DISCONTINUED | OUTPATIENT
Start: 2022-03-05 | End: 2022-03-05 | Stop reason: HOSPADM

## 2022-03-05 RX ORDER — FUROSEMIDE 10 MG/ML
40 INJECTION INTRAMUSCULAR; INTRAVENOUS ONCE
Status: COMPLETED | OUTPATIENT
Start: 2022-03-05 | End: 2022-03-05

## 2022-03-05 RX ORDER — ALBUTEROL SULFATE 90 UG/1
2 AEROSOL, METERED RESPIRATORY (INHALATION) 4 TIMES DAILY PRN
Status: DISCONTINUED | OUTPATIENT
Start: 2022-03-05 | End: 2022-03-05 | Stop reason: CLARIF

## 2022-03-05 RX ORDER — ARFORMOTEROL TARTRATE 15 UG/2ML
15 SOLUTION RESPIRATORY (INHALATION) 2 TIMES DAILY
Status: DISCONTINUED | OUTPATIENT
Start: 2022-03-05 | End: 2022-03-05 | Stop reason: HOSPADM

## 2022-03-05 RX ORDER — METOPROLOL SUCCINATE 50 MG/1
50 TABLET, EXTENDED RELEASE ORAL 2 TIMES DAILY
Status: DISCONTINUED | OUTPATIENT
Start: 2022-03-05 | End: 2022-03-05 | Stop reason: HOSPADM

## 2022-03-05 RX ORDER — TRAZODONE HYDROCHLORIDE 50 MG/1
50 TABLET ORAL NIGHTLY
Status: DISCONTINUED | OUTPATIENT
Start: 2022-03-05 | End: 2022-03-05 | Stop reason: HOSPADM

## 2022-03-05 RX ORDER — BUDESONIDE AND FORMOTEROL FUMARATE DIHYDRATE 160; 4.5 UG/1; UG/1
2 AEROSOL RESPIRATORY (INHALATION) 2 TIMES DAILY
Status: DISCONTINUED | OUTPATIENT
Start: 2022-03-05 | End: 2022-03-05 | Stop reason: CLARIF

## 2022-03-05 RX ORDER — NICOTINE POLACRILEX 4 MG
15 LOZENGE BUCCAL PRN
Status: DISCONTINUED | OUTPATIENT
Start: 2022-03-05 | End: 2022-03-05 | Stop reason: HOSPADM

## 2022-03-05 RX ORDER — AMLODIPINE BESYLATE 10 MG/1
10 TABLET ORAL DAILY
Status: DISCONTINUED | OUTPATIENT
Start: 2022-03-05 | End: 2022-03-05 | Stop reason: HOSPADM

## 2022-03-05 RX ORDER — ACETAMINOPHEN 500 MG
1000 TABLET ORAL ONCE
Status: COMPLETED | OUTPATIENT
Start: 2022-03-05 | End: 2022-03-05

## 2022-03-05 RX ORDER — DEXTROSE MONOHYDRATE 25 G/50ML
12.5 INJECTION, SOLUTION INTRAVENOUS PRN
Status: DISCONTINUED | OUTPATIENT
Start: 2022-03-05 | End: 2022-03-05 | Stop reason: HOSPADM

## 2022-03-05 RX ORDER — BUDESONIDE 0.5 MG/2ML
0.5 INHALANT ORAL 2 TIMES DAILY
Status: DISCONTINUED | OUTPATIENT
Start: 2022-03-05 | End: 2022-03-05 | Stop reason: HOSPADM

## 2022-03-05 RX ORDER — CLOPIDOGREL BISULFATE 75 MG/1
75 TABLET ORAL DAILY
Status: DISCONTINUED | OUTPATIENT
Start: 2022-03-05 | End: 2022-03-05 | Stop reason: HOSPADM

## 2022-03-05 RX ORDER — FUROSEMIDE 40 MG/1
40 TABLET ORAL DAILY
Status: DISCONTINUED | OUTPATIENT
Start: 2022-03-05 | End: 2022-03-05 | Stop reason: HOSPADM

## 2022-03-05 RX ORDER — DEXTROSE MONOHYDRATE 50 MG/ML
100 INJECTION, SOLUTION INTRAVENOUS PRN
Status: DISCONTINUED | OUTPATIENT
Start: 2022-03-05 | End: 2022-03-05 | Stop reason: HOSPADM

## 2022-03-05 RX ORDER — ASPIRIN 81 MG/1
81 TABLET ORAL DAILY
Status: DISCONTINUED | OUTPATIENT
Start: 2022-03-05 | End: 2022-03-05 | Stop reason: HOSPADM

## 2022-03-05 RX ORDER — LISINOPRIL 10 MG/1
10 TABLET ORAL DAILY
Status: DISCONTINUED | OUTPATIENT
Start: 2022-03-05 | End: 2022-03-05 | Stop reason: HOSPADM

## 2022-03-05 RX ADMIN — FUROSEMIDE 40 MG: 10 INJECTION, SOLUTION INTRAMUSCULAR; INTRAVENOUS at 03:29

## 2022-03-05 RX ADMIN — ACETAMINOPHEN 1000 MG: 500 TABLET ORAL at 02:45

## 2022-03-05 ASSESSMENT — ENCOUNTER SYMPTOMS
DIARRHEA: 0
NAUSEA: 0
CHEST TIGHTNESS: 0
ABDOMINAL PAIN: 0
VOMITING: 0
EYE ITCHING: 0
ABDOMINAL DISTENTION: 0
EYE REDNESS: 0
WHEEZING: 0
BACK PAIN: 0
SHORTNESS OF BREATH: 1
CONSTIPATION: 0
TROUBLE SWALLOWING: 0
RHINORRHEA: 0

## 2022-03-05 ASSESSMENT — PAIN SCALES - GENERAL
PAINLEVEL_OUTOF10: 0
PAINLEVEL_OUTOF10: 8

## 2022-03-05 NOTE — PROGRESS NOTES
Pt decided that he wanted to sign out against advice d/t needing to take care of his dogs. Educated the patient that he needs to follow up with his pcp, take his home prescription of lasix, and if his symptoms progress he needs to go to the ER.

## 2022-03-05 NOTE — PROGRESS NOTES
Your patient is on a medication that requires a renal and/or weight dose adjustment. Renal/Body Weight Function Assessment:    Date Body Weight IBW  Adjusted BW SCr  CrCl Dialysis status   3/5/2022 173.3 kg Ideal body weight: 82.2 kg (181 lb 3.5 oz)  Adjusted ideal body weight: 118.6 kg (261 lb 8.5 oz) Serum creatinine: 1 mg/dL 03/05/22 0050  Estimated creatinine clearance: 140 mL/min N/a       Pharmacy has dose-adjusted the following medication(s):    Date Previous Order Adjusted Order   3/5/2022 Lovenox 40mg QD Lovenox 40mg BID       These changes were made per protocol according to the [facility] Renal Dosing Policy/ [facility] Pharmacist Anticoagulant Review. *Please note this dose may need readjusted if your patient's condition changes. Please contact pharmacy with any questions regarding these changes.     Chey Neely, [facility]  3/5/2022  6:56 AM

## 2022-03-05 NOTE — ED PROVIDER NOTES
Alana Oreilly is a 56 y.o. male    Chief Complaint   Patient presents with    Shortness of Breath     since wednesday was feeling ill about 1 1/2 weeks ago took z pack and breathing tx. felt better but after finishing tx started to feel SOB with cough dyspnean with exertion         HPI   Aalna Oreilly is a 56 y.o. male presenting to the ED for Shortness of Breath (since wednesday was feeling ill about 1 1/2 weeks ago took z pack and breathing tx. felt better but after finishing tx started to feel SOB with cough dyspnean with exertion)    History comes primarily from the patient. Patient presents for shortness of breath and feelings of illness been ongoing for about a week. He went to urgent care about a week ago, swabbed for Covid, told he had fluid in his lungs and then given a Z-Gregory and steroids. He did feel better afterwards however. Starting 2 to 3 days ago he became even more short of breath and with even mild exertion was unable to breathe well. He also came unable to breathe while laying down even with the CPAP machine on. He does say that when he is sitting and doing nothing he feels fine. 3 months ago, the patient had a heart attack with \"  maker. \"  Patient smokes pack a day. Patient takes multiple medications and sees Dr. Antonella Underwood. Patient has been missing doses of Lasix. Symptoms began a week ago with severe severity, and is persistent. No other modifying factors or associated symptoms. Review of Systems   Constitutional: Negative for appetite change, fatigue and fever. HENT: Negative for congestion, rhinorrhea and trouble swallowing. Eyes: Negative for redness and itching. Respiratory: Positive for shortness of breath. Negative for chest tightness and wheezing. Cardiovascular: Negative for chest pain, palpitations and leg swelling. Gastrointestinal: Negative for abdominal distention, abdominal pain, constipation, diarrhea, nausea and vomiting.    Genitourinary: Negative for decreased urine volume, difficulty urinating and frequency. Musculoskeletal: Negative for arthralgias, back pain and myalgias. Neurological: Negative for dizziness, syncope, weakness, numbness and headaches. Psychiatric/Behavioral: Negative for agitation, behavioral problems, confusion and decreased concentration. The patient is not nervous/anxious. All other systems reviewed and are negative. Physical Exam  Vitals reviewed. Constitutional:       General: He is not in acute distress. Appearance: Normal appearance. He is obese. He is not ill-appearing. HENT:      Head: Normocephalic and atraumatic. Nose: Nose normal. No congestion or rhinorrhea. Mouth/Throat:      Mouth: Mucous membranes are moist.      Pharynx: Oropharynx is clear. No oropharyngeal exudate or posterior oropharyngeal erythema. Eyes:      Extraocular Movements: Extraocular movements intact. Conjunctiva/sclera: Conjunctivae normal.      Pupils: Pupils are equal, round, and reactive to light. Cardiovascular:      Rate and Rhythm: Normal rate and regular rhythm. Heart sounds: Normal heart sounds. No murmur heard. Pulmonary:      Effort: Pulmonary effort is normal. No respiratory distress. Breath sounds: Decreased breath sounds and rhonchi present. Abdominal:      General: Abdomen is flat. There is no distension. Tenderness: There is no abdominal tenderness. There is no guarding. Musculoskeletal:         General: No swelling. Normal range of motion. Cervical back: Normal range of motion. No rigidity or tenderness. Right lower leg: Edema present. Left lower leg: Edema present. Skin:     General: Skin is warm and dry. Coloration: Skin is not jaundiced or pale. Findings: No bruising or erythema. Neurological:      General: No focal deficit present. Mental Status: He is alert and oriented to person, place, and time.       Cranial Nerves: No cranial nerve deficit. Motor: No weakness. Psychiatric:         Mood and Affect: Mood normal.         Behavior: Behavior normal.         Thought Content: Thought content normal.          Procedures     MDM   Patient presented to the Emergency Department for Shortness of Breath (since wednesday was feeling ill about 1 1/2 weeks ago took z pack and breathing tx. felt better but after finishing tx started to feel SOB with cough dyspnean with exertion)    They are clinically stable, vital signs stable, non toxic appearing. Vital signs interpreted myself as hypertensive  History and physical examination findings consistent with CHF exacerbation    Labs:  BNP 1900  Troponin delta negative    Imaging:  Chest x-ray shows pulmonary edema    This patient will be admitted  This patient appears to have CHF exacerbation  We did the following: Patient started on Lasix here in the emergency room. Patient appears to have CHF exacerbation. Other etiologies including infection do not appear likely. Considering his recent tach 3 months ago, this patient will need to have his diuresis done in the hospital.  I spoke with the patient concerning the findings and plan. Patient agrees. EKG: This EKG is signed and interpreted by me. Rate: 78  Rhythm: Sinus rhythm with respiratory variation  Axis: left  Interpretation: no acute changes  Comparison: changes compared to previous EKG         --------------------------------------------- PAST HISTORY ---------------------------------------------  Past Medical History:  has a past medical history of Accelerated hypertension, Arthritis, CAD (coronary artery disease), Cerebral artery occlusion with cerebral infarction ([facility] [address] CHF (congestive heart failure) ([facility] [address] COPD (chronic obstructive pulmonary disease) ([facility] [address] Depression with anxiety, Fibromyalgia, GI bleeding, Hyperlipidemia, Hypertension, Myocardial infarct [facility]), Sleep apnea, and Urinary frequency.     Past Surgical History:  has a past surgical history that includes Tonsillectomy; Colonoscopy (08/18/2017); Endoscopy, colon, diagnostic (08/18/2017); Cardiac catheterization (03/16/2017); Cardiac catheterization (04/12/2018); pr [facility] incl fluor gdnce dx w/cell washg spx (N/A, 10/22/2018); pr laryngoscopy,dirct,op scope,biopsy (N/A, 11/1/2018); Coronary angioplasty with stent (09/13/2019); and Coronary angioplasty with stent (10/27/2021). Social History:  reports that he has been smoking cigarettes. He has a 16.50 pack-year smoking history. He has never used smokeless tobacco. He reports current alcohol use of about 6.0 standard drinks of alcohol per week. He reports current drug use. Drug: Marijuana Silvino Araujo). Family History: family history includes Heart Disease in his mother; No Known Problems in his brother, brother, sister, and sister; Other in his father. The patients home medications have been reviewed.     Allergies: Pcn [penicillins]    -------------------------------------------------- RESULTS -------------------------------------------------    LABS:  Results for orders placed or performed during the hospital encounter of 03/05/22   COVID-19, Rapid    Specimen: Nasopharyngeal Swab   Result Value Ref Range    SARS-CoV-2, NAAT Not Detected Not Detected   Troponin   Result Value Ref Range    Troponin, High Sensitivity 43 (H) 0 - 11 ng/L   CBC with Auto Differential   Result Value Ref Range    WBC 6.9 4.5 - 11.5 E9/L    RBC 4.36 3.80 - 5.80 E12/L    Hemoglobin 13.7 12.5 - 16.5 g/dL    Hematocrit 41.6 37.0 - 54.0 %    MCV 95.4 80.0 - 99.9 fL    MCH 31.4 26.0 - 35.0 pg    MCHC 32.9 32.0 - 34.5 %    RDW 13.8 11.5 - 15.0 fL    Platelets 839 802 - 372 E9/L    MPV 9.0 7.0 - 12.0 fL    Neutrophils % 65.3 43.0 - 80.0 %    Immature Granulocytes % 0.6 0.0 - 5.0 %    Lymphocytes % 25.3 20.0 - 42.0 %    Monocytes % 5.5 2.0 - 12.0 %    Eosinophils % 2.9 0.0 - 6.0 %    Basophils % 0.4 0.0 - 2.0 %    Neutrophils Absolute 4.47 1.80 - 7.30 E9/L Immature Granulocytes # 0.04 E9/L    Lymphocytes Absolute 1.73 1.50 - 4.00 E9/L    Monocytes Absolute 0.38 0.10 - 0.95 E9/L    Eosinophils Absolute 0.20 0.05 - 0.50 E9/L    Basophils Absolute 0.03 0.00 - 0.20 E9/L   Comprehensive Metabolic Panel   Result Value Ref Range    Sodium 139 132 - 146 mmol/L    Potassium 4.0 3.5 - 5.0 mmol/L    Chloride 103 98 - 107 mmol/L    CO2 23 22 - 29 mmol/L    Anion Gap 13 7 - 16 mmol/L    Glucose 163 (H) 74 - 99 mg/dL    BUN 15 6 - 20 mg/dL    CREATININE 1.0 0.7 - 1.2 mg/dL    GFR Non-African American >60 >=60 mL/min/1.73    GFR African American >60     Calcium 8.9 8.6 - 10.2 mg/dL    Total Protein 7.0 6.4 - 8.3 g/dL    Albumin 4.1 3.5 - 5.2 g/dL    Total Bilirubin 0.5 0.0 - 1.2 mg/dL    Alkaline Phosphatase 94 40 - 129 U/L    ALT 20 0 - 40 U/L    AST 22 0 - 39 U/L   Lipase   Result Value Ref Range    Lipase 35 13 - 60 U/L   Urinalysis   Result Value Ref Range    Color, UA Yellow Straw/Yellow    Clarity, UA Clear Clear    Glucose, Ur Negative Negative mg/dL    Bilirubin Urine Negative Negative    Ketones, Urine Negative Negative mg/dL    Specific Gravity, UA 1.025 1.005 - 1.030    Blood, Urine Negative Negative    pH, UA 6.0 5.0 - 9.0    Protein, UA TRACE Negative mg/dL    Urobilinogen, Urine 0.2 <2.0 E.U./dL    Nitrite, Urine Negative Negative    Leukocyte Esterase, Urine TRACE (A) Negative   Brain Natriuretic Peptide   Result Value Ref Range    Pro-BNP 1,896 (H) 0 - 125 pg/mL   Microscopic Urinalysis   Result Value Ref Range    WBC, UA 2-5 0 - 5 /HPF    RBC, UA 0-1 0 - 2 /HPF    Epithelial Cells, UA RARE /HPF    Bacteria, UA FEW (A) None Seen /HPF   SPECIMEN REJECTION   Result Value Ref Range    Rejected Test TRP5     Reason for Rejection see below    Troponin   Result Value Ref Range    Troponin, High Sensitivity 44 (H) 0 - 11 ng/L   EKG 12 Lead   Result Value Ref Range    Ventricular Rate 78 BPM    Atrial Rate 78 BPM    P-R Interval 164 ms    QRS Duration 86 ms    Q-T Interval 388 ms    QTc Calculation (Bazett) 442 ms    P Axis 19 degrees    R Axis -18 degrees    T Axis 134 degrees       RADIOLOGY:  XR CHEST PORTABLE   Final Result   Questionable mild bilateral interstitial thickening, which could suggest   pulmonary edema. Clinical correlation recommended. ------------------------- NURSING NOTES AND VITALS REVIEWED ---------------------------  Date / Time Roomed:  3/5/2022  1:04 AM  ED Bed Assignment:  [id number]    The nursing notes within the ED encounter and vital signs as below have been reviewed. Patient Vitals for the past 24 hrs:   BP Temp Temp src Pulse Resp SpO2 Height Weight   03/05/22 0530 (!) 163/119 96.2 °F (35.7 °C) Tympanic 73 24 95 % 6' 2\" (1.88 m) (!) 382 lb (173.3 kg)   03/05/22 0200 (!) 185/130   79 17 95 %     03/05/22 0130 (!) 172/130   80 16      03/05/22 0035 (!) 187/123 98.3 °F (36.8 °C) Oral 89 24 96 %     03/05/22 0019    90  93 %         Oxygen Saturation Interpretation: Normal    ------------------------------------------ PROGRESS NOTES ------------------------------------------  Re-evaluation(s):  Time: Multiple reevaluation's. Patients symptoms show no change  Repeat physical examination is not changed    Counseling:  I have spoken with the patient and discussed todays results, in addition to providing specific details for the plan of care and counseling regarding the diagnosis and prognosis. Their questions are answered at this time and they are agreeable with the plan of admission.    --------------------------------- ADDITIONAL PROVIDER NOTES ---------------------------------  Consultations:  Time: 0 430. Spoke with Dr. Ash Ugarte. Discussed case. They will admit the patient.   This patient's ED course included: a personal history and physicial examination, re-evaluation prior to disposition, IV medications, cardiac monitoring and continuous pulse oximetry    This patient has remained hemodynamically stable during their ED course. Diagnosis:  1. Acute on chronic congestive heart failure, unspecified heart failure type ([facility] [address]        Disposition:  Patient's disposition: Admit to telemetry  Patient's condition is stable.              Emil Hines MD  Resident  03/05/22 [id number]

## 2022-03-05 NOTE — ED NOTES
Department of Emergency Medicine  FIRST PROVIDER TRIAGE NOTE             Independent MLP           3/5/22  12:33 AM EST    Date of Encounter: 3/5/22   MRN: [id number]      HPI: Kang Penn is a 56 y.o. male who presents to the ED for No chief complaint on file. Patient with increasing shortness of breath over the last several days with any type of ambulation. Patient also reports pain to right upper quadrant has been ongoing for week to 2 weeks. No chest pain with this. Has been taking breathing treatments without effect. ROS: Negative for urinary complaints or rash. PE: Gen Appearance/Constitutional: alert  HEENT: NC/NT. PERRLA,  Airway patent. Initial Plan of Care: All treatment areas with department are currently occupied. Plan to order/Initiate the following while awaiting opening in ED: labs, EKG and imaging studies.   Initiate Treatment-Testing, Proceed toTreatment Area When Bed Available for ED Attending/MLP to Continue Care    Electronically signed by YUMIKO Tai CNP   DD: 3/5/22         YUMIKO Tai CNP  03/05/22 0034

## 2022-03-05 NOTE — H&P
57-year-old man admitted through emergency room with a diagnosis of acute on chronic congestive heart failure  Spoke with the ER physician at the time of admission this morning around 430  I was rounding around 730 by which time he left AMA  I did not have a chance to physically see this patient

## 2022-03-06 LAB
EKG ATRIAL RATE: 78 BPM
EKG P AXIS: 19 DEGREES
EKG P-R INTERVAL: 164 MS
EKG Q-T INTERVAL: 388 MS
EKG QRS DURATION: 86 MS
EKG QTC CALCULATION (BAZETT): 442 MS
EKG R AXIS: -18 DEGREES
EKG T AXIS: 134 DEGREES
EKG VENTRICULAR RATE: 78 BPM

## 2022-03-06 PROCEDURE — 93010 ELECTROCARDIOGRAM REPORT: CPT | Performed by: INTERNAL MEDICINE

## 2022-04-04 ENCOUNTER — APPOINTMENT (OUTPATIENT)
Dept: GENERAL RADIOLOGY | Age: 57
End: 2022-04-04
Payer: MEDICARE

## 2022-04-04 ENCOUNTER — HOSPITAL ENCOUNTER (EMERGENCY)
Age: 57
Discharge: LEFT BEFORE DISCHARGE | End: 2022-04-04
Attending: EMERGENCY MEDICINE
Payer: MEDICARE

## 2022-04-04 VITALS
TEMPERATURE: 98.2 F | BODY MASS INDEX: 42.66 KG/M2 | SYSTOLIC BLOOD PRESSURE: 154 MMHG | WEIGHT: 315 LBS | DIASTOLIC BLOOD PRESSURE: 67 MMHG | HEART RATE: 87 BPM | OXYGEN SATURATION: 96 % | HEIGHT: 72 IN | RESPIRATION RATE: 20 BRPM

## 2022-04-04 DIAGNOSIS — I50.9 ACUTE ON CHRONIC CONGESTIVE HEART FAILURE, UNSPECIFIED HEART FAILURE TYPE (HCC): Primary | ICD-10-CM

## 2022-04-04 LAB
ALBUMIN SERPL-MCNC: 4 G/DL (ref 3.5–5.2)
ALP BLD-CCNC: 87 U/L (ref 40–129)
ALT SERPL-CCNC: 28 U/L (ref 0–40)
ANION GAP SERPL CALCULATED.3IONS-SCNC: 12 MMOL/L (ref 7–16)
AST SERPL-CCNC: 21 U/L (ref 0–39)
BASOPHILS ABSOLUTE: 0.03 E9/L (ref 0–0.2)
BASOPHILS RELATIVE PERCENT: 0.4 % (ref 0–2)
BILIRUB SERPL-MCNC: 0.3 MG/DL (ref 0–1.2)
BUN BLDV-MCNC: 16 MG/DL (ref 6–20)
CALCIUM SERPL-MCNC: 9.3 MG/DL (ref 8.6–10.2)
CHLORIDE BLD-SCNC: 108 MMOL/L (ref 98–107)
CO2: 21 MMOL/L (ref 22–29)
CREAT SERPL-MCNC: 1.1 MG/DL (ref 0.7–1.2)
EKG ATRIAL RATE: 86 BPM
EKG P AXIS: 43 DEGREES
EKG P-R INTERVAL: 158 MS
EKG Q-T INTERVAL: 372 MS
EKG QRS DURATION: 88 MS
EKG QTC CALCULATION (BAZETT): 445 MS
EKG R AXIS: -32 DEGREES
EKG T AXIS: 127 DEGREES
EKG VENTRICULAR RATE: 86 BPM
EOSINOPHILS ABSOLUTE: 0.1 E9/L (ref 0.05–0.5)
EOSINOPHILS RELATIVE PERCENT: 1.3 % (ref 0–6)
GFR AFRICAN AMERICAN: >60
GFR NON-AFRICAN AMERICAN: >60 ML/MIN/1.73
GLUCOSE BLD-MCNC: 155 MG/DL (ref 74–99)
HCT VFR BLD CALC: 42.1 % (ref 37–54)
HEMOGLOBIN: 13.9 G/DL (ref 12.5–16.5)
IMMATURE GRANULOCYTES #: 0.02 E9/L
IMMATURE GRANULOCYTES %: 0.3 % (ref 0–5)
LYMPHOCYTES ABSOLUTE: 1.71 E9/L (ref 1.5–4)
LYMPHOCYTES RELATIVE PERCENT: 21.9 % (ref 20–42)
MCH RBC QN AUTO: 31.5 PG (ref 26–35)
MCHC RBC AUTO-ENTMCNC: 33 % (ref 32–34.5)
MCV RBC AUTO: 95.5 FL (ref 80–99.9)
MONOCYTES ABSOLUTE: 0.67 E9/L (ref 0.1–0.95)
MONOCYTES RELATIVE PERCENT: 8.6 % (ref 2–12)
NEUTROPHILS ABSOLUTE: 5.27 E9/L (ref 1.8–7.3)
NEUTROPHILS RELATIVE PERCENT: 67.5 % (ref 43–80)
PDW BLD-RTO: 13.6 FL (ref 11.5–15)
PLATELET # BLD: 183 E9/L (ref 130–450)
PMV BLD AUTO: 9 FL (ref 7–12)
POTASSIUM REFLEX MAGNESIUM: 4.1 MMOL/L (ref 3.5–5)
PRO-BNP: 1114 PG/ML (ref 0–125)
RBC # BLD: 4.41 E12/L (ref 3.8–5.8)
SODIUM BLD-SCNC: 141 MMOL/L (ref 132–146)
TOTAL PROTEIN: 7 G/DL (ref 6.4–8.3)
TROPONIN, HIGH SENSITIVITY: 39 NG/L (ref 0–11)
TROPONIN, HIGH SENSITIVITY: 43 NG/L (ref 0–11)
TROPONIN, HIGH SENSITIVITY: 49 NG/L (ref 0–11)
WBC # BLD: 7.8 E9/L (ref 4.5–11.5)

## 2022-04-04 PROCEDURE — 99284 EMERGENCY DEPT VISIT MOD MDM: CPT

## 2022-04-04 PROCEDURE — 84484 ASSAY OF TROPONIN QUANT: CPT

## 2022-04-04 PROCEDURE — 6360000002 HC RX W HCPCS: Performed by: EMERGENCY MEDICINE

## 2022-04-04 PROCEDURE — 83880 ASSAY OF NATRIURETIC PEPTIDE: CPT

## 2022-04-04 PROCEDURE — 80053 COMPREHEN METABOLIC PANEL: CPT

## 2022-04-04 PROCEDURE — 85025 COMPLETE CBC W/AUTO DIFF WBC: CPT

## 2022-04-04 PROCEDURE — 93005 ELECTROCARDIOGRAM TRACING: CPT | Performed by: PHYSICIAN ASSISTANT

## 2022-04-04 PROCEDURE — 71046 X-RAY EXAM CHEST 2 VIEWS: CPT

## 2022-04-04 PROCEDURE — 96374 THER/PROPH/DIAG INJ IV PUSH: CPT

## 2022-04-04 PROCEDURE — 93010 ELECTROCARDIOGRAM REPORT: CPT | Performed by: INTERNAL MEDICINE

## 2022-04-04 RX ORDER — FUROSEMIDE 10 MG/ML
80 INJECTION INTRAMUSCULAR; INTRAVENOUS ONCE
Status: COMPLETED | OUTPATIENT
Start: 2022-04-04 | End: 2022-04-04

## 2022-04-04 RX ADMIN — FUROSEMIDE 80 MG: 10 INJECTION, SOLUTION INTRAMUSCULAR; INTRAVENOUS at 03:51

## 2022-04-04 NOTE — ED PROVIDER NOTES
alcohol use of about 6.0 standard drinks of alcohol per week. He reports current drug use. Drug: Marijuana [place]). Family History: family history includes Heart Disease in his mother; No Known Problems in his brother, brother, sister, and sister; Other in his father. The patients home medications have been reviewed. Allergies: Pcn [penicillins]        ---------------------------------------------------PHYSICAL EXAM--------------------------------------    Constitutional/General: Alert and oriented x3, no acute distress, obese  Head: Normocephalic and atraumatic  Eyes: PERRL, EOMI, conjunctive normal, sclera non icteric  Mouth: Oropharynx clear, handling secretions, no trismus, no asymmetry of the posterior oropharynx or uvular edema  Neck: Supple, full ROM, non tender to palpation in the midline, no stridor, no crepitus, no meningeal signs  Respiratory: Lungs clear to auscultation bilaterally, no wheezes, rales, or rhonchi. Not in respiratory distress  Cardiovascular:  Regular rate. Regular rhythm. No murmurs, gallops, or rubs. 2+ distal pulses  GI:  Abdomen Soft, Non tender, Non distended. +BS. No organomegaly, no palpable masses,  No rebound, guarding, or rigidity. Musculoskeletal: Moves all extremities x 4. Warm and well perfused, no clubbing, cyanosis. Edema noted bilateral lower extremities capillary refill <3 seconds  Integument: skin warm and dry. No rashes. Neurologic: GCS 15, no focal deficits, symmetric strength 5/5 in the upper and lower extremities bilaterally  Psychiatric: Normal Affect    -------------------------------------------------- RESULTS -------------------------------------------------  I have personally reviewed all laboratory and imaging results for this patient. Results are listed below.      LABS:  Results for orders placed or performed during the hospital encounter of 04/04/22   CBC with Auto Differential   Result Value Ref Range    WBC 7.8 4.5 - 11.5 E9/L    RBC 4.41 3.80 - 5.80 E12/L    Hemoglobin 13.9 12.5 - 16.5 g/dL    Hematocrit 42.1 37.0 - 54.0 %    MCV 95.5 80.0 - 99.9 fL    MCH 31.5 26.0 - 35.0 pg    MCHC 33.0 32.0 - 34.5 %    RDW 13.6 11.5 - 15.0 fL    Platelets 110 961 - 707 E9/L    MPV 9.0 7.0 - 12.0 fL    Neutrophils % 67.5 43.0 - 80.0 %    Immature Granulocytes % 0.3 0.0 - 5.0 %    Lymphocytes % 21.9 20.0 - 42.0 %    Monocytes % 8.6 2.0 - 12.0 %    Eosinophils % 1.3 0.0 - 6.0 %    Basophils % 0.4 0.0 - 2.0 %    Neutrophils Absolute 5.27 1.80 - 7.30 E9/L    Immature Granulocytes # 0.02 E9/L    Lymphocytes Absolute 1.71 1.50 - 4.00 E9/L    Monocytes Absolute 0.67 0.10 - 0.95 E9/L    Eosinophils Absolute 0.10 0.05 - 0.50 E9/L    Basophils Absolute 0.03 0.00 - 0.20 E9/L   Comprehensive Metabolic Panel w/ Reflex to MG   Result Value Ref Range    Sodium 141 132 - 146 mmol/L    Potassium reflex Magnesium 4.1 3.5 - 5.0 mmol/L    Chloride 108 (H) 98 - 107 mmol/L    CO2 21 (L) 22 - 29 mmol/L    Anion Gap 12 7 - 16 mmol/L    Glucose 155 (H) 74 - 99 mg/dL    BUN 16 6 - 20 mg/dL    CREATININE 1.1 0.7 - 1.2 mg/dL    GFR Non-African American >60 >=60 mL/min/1.73    GFR African American >60     Calcium 9.3 8.6 - 10.2 mg/dL    Total Protein 7.0 6.4 - 8.3 g/dL    Albumin 4.0 3.5 - 5.2 g/dL    Total Bilirubin 0.3 0.0 - 1.2 mg/dL    Alkaline Phosphatase 87 40 - 129 U/L    ALT 28 0 - 40 U/L    AST 21 0 - 39 U/L   Troponin   Result Value Ref Range    Troponin, High Sensitivity 39 (H) 0 - 11 ng/L   Brain Natriuretic Peptide   Result Value Ref Range    Pro-BNP 1,114 (H) 0 - 125 pg/mL   Troponin   Result Value Ref Range    Troponin, High Sensitivity 43 (H) 0 - 11 ng/L   Troponin   Result Value Ref Range    Troponin, High Sensitivity 49 (H) 0 - 11 ng/L       RADIOLOGY:  Interpreted by Radiologist.  XR CHEST (2 VW)   Final Result   Mild interstitial and patchy airspace opacities suggestive of mild edema. Pneumonia less likely but not excluded.              EKG:  This EKG is signed and has remained hemodynamically stable during their ED course. Re-Evaluations:             Re-evaluation. Patients symptoms are improving      Counseling: The emergency provider has spoken with the patient and discussed todays results, in addition to providing specific details for the plan of care and counseling regarding the diagnosis and prognosis. Questions are answered at this time and they are agreeable with the plan.       --------------------------------- IMPRESSION AND DISPOSITION ---------------------------------    IMPRESSION  1. Acute on chronic congestive heart failure, unspecified heart failure type ([facility] [address]        DISPOSITION  Disposition: Other Disposition: Left AMA  Patient condition is stable    NOTE: This report was transcribed using voice recognition software.  Every effort was made to ensure accuracy; however, inadvertent computerized transcription errors may be present        Vicky Moy DO  04/04/22 [id number]

## 2022-04-04 NOTE — ED NOTES
Department of Emergency Medicine  FIRST PROVIDER TRIAGE NOTE             Independent MLP           4/4/22  12:57 AM EDT    Date of Encounter: 4/4/22   MRN: [id number]      HPI: Dorcas Ornelas is a 56 y.o. male who presents to the ED for No chief complaint on file. shortness of breath x 5 days. Was admitted 1 month ago for CHF exacerbation. Did increase lasix at home several days ago which initially helped but is no longer. Does have chest pain with the SOB. ROS: Negative for abd pain or back pain. PE: Gen Appearance/Constitutional: alert  Musculoskeletal: moves all extremities x 4     Initial Plan of Care: All treatment areas with department are currently occupied. Plan to order/Initiate the following while awaiting opening in ED: labs, EKG and imaging studies.   Initiate Treatment-Testing, Proceed toTreatment Area When Bed Available for ED Attending/MLP to Continue Care    Electronically signed by JESSE Britton   DD: 4/4/22         Bolivar Britton  04/04/22 [id number]

## 2022-04-14 ENCOUNTER — OFFICE VISIT (OUTPATIENT)
Dept: CARDIOLOGY CLINIC | Age: 57
End: 2022-04-14
Payer: MEDICARE

## 2022-04-14 ENCOUNTER — HOSPITAL ENCOUNTER (OUTPATIENT)
Age: 57
Discharge: HOME | End: 2022-04-14
Payer: MEDICARE

## 2022-04-14 VITALS
BODY MASS INDEX: 42.66 KG/M2 | HEIGHT: 72 IN | HEART RATE: 75 BPM | OXYGEN SATURATION: 96 % | RESPIRATION RATE: 16 BRPM | SYSTOLIC BLOOD PRESSURE: 158 MMHG | DIASTOLIC BLOOD PRESSURE: 97 MMHG | WEIGHT: 315 LBS

## 2022-04-14 DIAGNOSIS — E66.01 MORBID OBESITY (HCC): ICD-10-CM

## 2022-04-14 DIAGNOSIS — I25.10 CORONARY ARTERY DISEASE INVOLVING NATIVE CORONARY ARTERY OF NATIVE HEART WITHOUT ANGINA PECTORIS: ICD-10-CM

## 2022-04-14 DIAGNOSIS — I50.30 HEART FAILURE WITH PRESERVED EJECTION FRACTION, UNSPECIFIED HF CHRONICITY (HCC): Primary | ICD-10-CM

## 2022-04-14 DIAGNOSIS — I10 HYPERTENSION, UNSPECIFIED TYPE: Primary | ICD-10-CM

## 2022-04-14 DIAGNOSIS — I50.30 HEART FAILURE WITH PRESERVED EJECTION FRACTION, UNSPECIFIED HF CHRONICITY (HCC): ICD-10-CM

## 2022-04-14 PROBLEM — R07.9 CHEST PAIN: Status: RESOLVED | Noted: 2021-08-27 | Resolved: 2022-04-14

## 2022-04-14 PROBLEM — I50.9 HEART FAILURE EXACERBATED BY SOTALOL (HCC): Status: RESOLVED | Noted: 2022-03-05 | Resolved: 2022-04-14

## 2022-04-14 PROBLEM — I21.4 NSTEMI (NON-ST ELEVATED MYOCARDIAL INFARCTION) (HCC): Status: RESOLVED | Noted: 2021-10-26 | Resolved: 2022-04-14

## 2022-04-14 LAB
ANION GAP SERPL CALCULATED.3IONS-SCNC: 12 MMOL/L (ref 7–16)
BUN BLDV-MCNC: 12 MG/DL (ref 6–20)
CALCIUM SERPL-MCNC: 9.4 MG/DL (ref 8.6–10.2)
CHLORIDE BLD-SCNC: 101 MMOL/L (ref 98–107)
CO2: 27 MMOL/L (ref 22–29)
CREAT SERPL-MCNC: 1.1 MG/DL (ref 0.7–1.2)
GFR AFRICAN AMERICAN: >60
GFR NON-AFRICAN AMERICAN: >60 ML/MIN/1.73
GLUCOSE BLD-MCNC: 142 MG/DL (ref 74–99)
POTASSIUM SERPL-SCNC: 4 MMOL/L (ref 3.5–5)
PRO-BNP: 1025 PG/ML (ref 0–125)
SODIUM BLD-SCNC: 140 MMOL/L (ref 132–146)

## 2022-04-14 PROCEDURE — 80048 BASIC METABOLIC PNL TOTAL CA: CPT

## 2022-04-14 PROCEDURE — 36415 COLL VENOUS BLD VENIPUNCTURE: CPT

## 2022-04-14 PROCEDURE — 99215 OFFICE O/P EST HI 40 MIN: CPT | Performed by: INTERNAL MEDICINE

## 2022-04-14 PROCEDURE — 83880 ASSAY OF NATRIURETIC PEPTIDE: CPT

## 2022-04-14 PROCEDURE — 93000 ELECTROCARDIOGRAM COMPLETE: CPT | Performed by: INTERNAL MEDICINE

## 2022-04-14 RX ORDER — BUMETANIDE 1 MG/1
TABLET ORAL
COMMUNITY
Start: 2022-04-06

## 2022-04-14 RX ORDER — LOSARTAN POTASSIUM 100 MG/1
TABLET ORAL
COMMUNITY
Start: 2022-03-29

## 2022-04-14 RX ORDER — TRAZODONE HYDROCHLORIDE 150 MG/1
TABLET ORAL
COMMUNITY
Start: 2022-03-29

## 2022-04-14 RX ORDER — SPIRONOLACTONE 25 MG/1
25 TABLET ORAL DAILY
Qty: 90 TABLET | Refills: 1 | Status: SHIPPED | OUTPATIENT
Start: 2022-04-14

## 2022-04-14 NOTE — PROGRESS NOTES
Chief Complaint   Patient presents with    Coronary Artery Disease    Congestive Heart Failure       Patient Active Problem List    Diagnosis Date Noted    Morbid obesity ([facility] [address] 04/14/2022    Coronary artery disease involving native coronary artery of native heart without angina pectoris 01/11/2021     Overview Note:     A.  PCI of RCA with two LEANNA ([place]): 9/13/2019  B. NSTEMI 10/21/2021:  PCI [facility]):  3.0 LEANNA LAD,       Tobacco abuse 11/22/2018    TIA (transient ischemic attack) 06/25/2018    (HFpEF) heart failure with preserved ejection fraction (HCC)     HTN (hypertension) 05/05/2017    Chronic kidney disease, stage II (mild) 03/06/2017    Pure hypercholesterolemia 03/06/2017    Obstructive sleep apnea 03/05/2017       Current Outpatient Medications   Medication Sig Dispense Refill    bumetanide (BUMEX) 1 MG tablet TAKE ONE TABLET BY MOUTH EVERY 12 HOURS      losartan (COZAAR) 100 MG tablet TAKE ONE TABLET BY MOUTH EVERY DAY AT BEDTIME      traZODone (DESYREL) 150 MG tablet TAKE ONE TABLET BY MOUTH AT BEDTIME      spironolactone (ALDACTONE) 25 MG tablet Take 1 tablet by mouth daily 90 tablet 1    atorvastatin (LIPITOR) 40 MG tablet TAKE ONE TABLET BY MOUTH EVERY DAY      aspirin 325 MG EC tablet Take 1 tablet by mouth once for 1 dose 30 tablet 3    metFORMIN (GLUCOPHAGE) 1000 MG tablet Take 1,000 mg by mouth 2 times daily (with meals)      albuterol sulfate HFA (VENTOLIN HFA) 108 (90 Base) MCG/ACT inhaler Inhale 2 puffs into the lungs 4 times daily as needed for Wheezing 1 Inhaler 0    QVAR REDIHALER 80 MCG/ACT AERB inhaler INHALE ONE PUFF BY MOUTH EVERY 12 HOURS WITH SPACER      amLODIPine (NORVASC) 10 MG tablet Take 1 tablet by mouth daily 90 tablet 5    budesonide-formoterol (SYMBICORT) 160-4.5 MCG/ACT AERO Inhale 2 puffs into the lungs 2 times daily 1 Inhaler 3    metoprolol succinate (TOPROL XL) 50 MG extended release tablet Take 1 tablet by mouth 2 times daily 60 tablet 3     No current facility-administered medications for this visit. Allergies   Allergen Reactions    Pcn [Penicillins] Other (See Comments)     As a child, does not remembered       Vitals:    04/14/22 0725   BP: (!) 158/97   Pulse: 75   Resp: 16   SpO2: 96%   Weight: (!) 376 lb (170.6 kg)   Height: 6' (1.829 m)       Social History     Socioeconomic History    Marital status: Single     Spouse name: Not on file    Number of children: Not on file    Years of education: Not on file    Highest education level: Not on file   Occupational History    Not on file   Tobacco Use    Smoking status: Current Every Day Smoker     Packs/day: 0.50     Years: 33.00     Pack years: 16.50     Types: Cigarettes    Smokeless tobacco: Never Used   Vaping Use    Vaping Use: Never used   Substance and Sexual Activity    Alcohol use: Yes     Alcohol/week: 6.0 standard drinks     Types: 6 Cans of beer per week     Comment: twice a week     Drug use: Yes     Types: Marijuana Diana Dinning)     Comment: once a week    Sexual activity: Not on file   Other Topics Concern    Not on file   Social History Narrative    Drinks occ [employer]. Social Determinants of Health     Financial Resource Strain:     Difficulty of Paying Living Expenses: Not on file   Food Insecurity:     Worried About Running Out of Food in the Last Year: Not on file    Jenny of Food in the Last Year: Not on file   Transportation Needs:     Lack of Transportation (Medical): Not on file    Lack of Transportation (Non-Medical):  Not on file   Physical Activity:     Days of Exercise per Week: Not on file    Minutes of Exercise per Session: Not on file   Stress:     Feeling of Stress : Not on file   Social Connections:     Frequency of Communication with Friends and Family: Not on file    Frequency of Social Gatherings with Friends and Family: Not on file    Attends Restorationist Services: Not on file    Active Member of Clubs or Organizations: Not on file    Attends [employer] or Organization Meetings: Not on file    Marital Status: Not on file   Intimate Partner Violence:     Fear of Current or Ex-Partner: Not on file    Emotionally Abused: Not on file    Physically Abused: Not on file    Sexually Abused: Not on file   Housing Stability:     Unable to Pay for Housing in the Last Year: Not on file    Number of [place] in the Last Year: Not on file    Unstable Housing in the Last Year: Not on file       Family History   Problem Relation Age of Onset    Heart Disease Mother     Other Father     No Known Problems Sister     No Known Problems Brother     No Known Problems Sister     No Known Problems Brother          SUBJECTIVE: Nahomy Lucas presents to the office today for re-evaluation of cardiac diagnoses. Extensive cardiac hx since I last saw him, including NSTEMI and PCI to LAD ( old stent site patent) and at least two visits to ED for CHF exacerbation. . I reviewed old records. Clear cut elevations in BNPs 1114 and 1896, and CXR claiming congestion. Admits to non compliance at time with meds. .   Carries a diagnosis of HFpEF by dr. Ale Beltran, last visit 2018 . Does not sample home BPs  Not taking his plavix        Review of Systems:   Heart: as above   Lungs: as above   Eyes: denies changes in vision or discharge. Ears: denies changes in hearing or pain. Nose: denies epistaxis or masses   Throat: denies sore throat or trouble swallowing. Neuro: denies numbness, tingling, tremors. Skin: denies rashes or itching. : denies hematuria, dysuria   GI: denies vomiting, diarrhea   Psych: denies mood changed, anxiety, depression. all others negative. Physical Exam   BP (!) 158/97   Pulse 75   Resp 16   Ht 6' (1.829 m)   Wt (!) 376 lb (170.6 kg)   SpO2 96%   BMI 50.99 kg/m²   Constitutional: Oriented to person, place, and time. Obese No distress. Head: Normocephalic and atraumatic.    Eyes: EOM are normal. Pupils are equal, round, and reactive to light.   Neck: obesity precludes reliable estimate of JVP   Cardiovascular: Normal rate, regular rhythm, normal heart sounds and intact distal pulses. Exam reveals no gallop and no friction rub. No murmur heard. Pulmonary/Chest: Effort normal and breath sounds normal. No respiratory distress. No wheezes. No rales. No tenderness. Abdominal: Soft. Bowel sounds are normal. No distension and no mass. No tenderness. No rebound and no guarding. Musculoskeletal: Normal range of motion. trace edema and no tenderness. Neurological: Alert and oriented to person, place, and time. Skin: Skin is warm and dry. No rash noted. Not diaphoretic. No erythema. Psychiatric: Normal mood and affect. Behavior is normal.     EKG:  normal sinus rhythm, nonspecific ST and T waves changes, unchanged from previous tracings. ASSESSMENT AND PLAN:  Patient Active Problem List   Diagnosis    Obstructive sleep apnea: compliant with CPAP    Chronic kidney disease    Pure hypercholesterolemia:    HTN (hypertension):    (HFpEF) heart failure with preserved ejection fraction :  Stage C, NYHA III. euvolemic today.  TIA (transient ischemic attack)    Tobacco abuse:     * Coronary artery disease: Morbid Obesity:      Long discussion with him about his disease processes and the role of non compliance with meds and supermorbid obesity play  I strongly recommended again, and put in a consult to Bariatric surgery program, especially in light of his non compliance with meds  Added spironolactone 25 mg qd, will get BMP and BNP in 6 days  Counseled on low salt diet, adherence to CPAP etc      The patient was educated as to the symptoms that would require a prompt return to our office.   Return visit in 1 month    Rogers Chong M.D  [facility]

## 2022-05-17 ENCOUNTER — TELEPHONE (OUTPATIENT)
Dept: BARIATRICS/WEIGHT MGMT | Age: 57
End: 2022-05-17

## 2022-05-17 NOTE — TELEPHONE ENCOUNTER
Patient called left message needed facility address.  Called patient back no answer left message with our facility address

## 2022-07-11 ENCOUNTER — OFFICE VISIT (OUTPATIENT)
Dept: FAMILY MEDICINE CLINIC | Age: 57
End: 2022-07-11
Payer: MEDICARE

## 2022-07-11 VITALS
DIASTOLIC BLOOD PRESSURE: 82 MMHG | TEMPERATURE: 97.8 F | WEIGHT: 315 LBS | RESPIRATION RATE: 20 BRPM | OXYGEN SATURATION: 95 % | SYSTOLIC BLOOD PRESSURE: 138 MMHG | HEART RATE: 86 BPM | HEIGHT: 72 IN | BODY MASS INDEX: 42.66 KG/M2

## 2022-07-11 DIAGNOSIS — R06.02 SHORTNESS OF BREATH: ICD-10-CM

## 2022-07-11 DIAGNOSIS — R07.9 CHEST PAIN, UNSPECIFIED TYPE: Primary | ICD-10-CM

## 2022-07-11 PROCEDURE — 93000 ELECTROCARDIOGRAM COMPLETE: CPT | Performed by: NURSE PRACTITIONER

## 2022-07-11 PROCEDURE — 99214 OFFICE O/P EST MOD 30 MIN: CPT | Performed by: NURSE PRACTITIONER

## 2022-07-11 RX ORDER — FUROSEMIDE 40 MG/1
TABLET ORAL
COMMUNITY
Start: 2022-04-26

## 2022-07-11 NOTE — PATIENT INSTRUCTIONS
Patient Education        Chest Pain: Care Instructions  Your Care Instructions     There are many things that can cause chest pain. Some are not serious and will get better on their own in a few days. But some kinds of chest pain need more testing and treatment. Your doctor may have recommended a follow-up visit in the next 8 to 12 hours. If you are not getting better, you may need more testsor treatment. Even though your doctor has released you, you still need to watch for any problems. The doctor carefully checked you, but sometimes problems can develop later. If you have new symptoms or if your symptoms do not get better, getmedical care right away. If you have worse or different chest pain or pressure that lasts more than 5 minutes or you passed out (lost consciousness), call 911 or seek other emergency help right away. A medical visit is only one step in your treatment. Even if you feel better, you still need to do what your doctor recommends, such as going to all suggested follow-up appointments and taking medicines exactly as directed. Thiswill help you recover and help prevent future problems. How can you care for yourself at home?  Rest until you feel better.  Take your medicine exactly as prescribed. Call your doctor if you think you are having a problem with your medicine.  Do not drive after taking a prescription pain medicine. When should you call for help? Call 911 if:     You passed out (lost consciousness).      You have severe difficulty breathing.      You have symptoms of a heart attack. These may include:  ? Chest pain or pressure, or a strange feeling in your chest.  ? Sweating. ? Shortness of breath. ? Nausea or vomiting. ? Pain, pressure, or a strange feeling in your back, neck, jaw, or upper belly or in one or both shoulders or arms. ? Lightheadedness or sudden weakness. ? A fast or irregular heartbeat.   After you call 911, the  may tell you to chew 1 stop-smoking programs and medicines. These can increase your chances of quitting for good.  Get plenty of rest and sleep.  Take your medicines exactly as prescribed. Call your doctor if you think you are having a problem with your medicine.  Find healthy ways to deal with stress. ? Exercise daily. ? Get plenty of sleep. ? Eat regularly and well. When should you call for help? Call 911 anytime you think you may need emergency care. For example, call if:     You have severe shortness of breath.      You have symptoms of a heart attack. These may include:  ? Chest pain or pressure, or a strange feeling in the chest.  ? Sweating. ? Shortness of breath. ? Nausea or vomiting. ? Pain, pressure, or a strange feeling in the back, neck, jaw, or upper belly or in one or both shoulders or arms. ? Lightheadedness or sudden weakness. ? A fast or irregular heartbeat. After you call 911, the  may tell you to chew 1 adult-strength or 2 to 4 low-dose aspirin. Wait for an ambulance. Do not try to drive yourself. Call your doctor now or seek immediate medical care if:     Your shortness of breath gets worse or you start to wheeze. Wheezing is a high-pitched sound when you breathe.      You wake up at night out of breath or have to prop your head up on several pillows to breathe.      You are short of breath after only light activity or while at rest.   Watch closely for changes in your health, and be sure to contact your doctor if:     You do not get better over the next 1 to 2 days. Where can you learn more? Go to https://[email or web address][email or web address] org and sign in to your [employer] account. Enter S780 in the [facility] box to learn more about \"Shortness of Breath: Care Instructions. \"     If you do not have an account, please click on the \"Sign Up Now\" link. Current as of: March 9, 2022               Content Version: 13.3  © 9318-4204 Healthwise, [employer]    Care instructions adapted under license by [facility] ([facility]). If you have questions about a medical condition or this instruction, always ask your healthcare professional. [address] any warranty or liability for your use of this information.

## 2022-07-11 NOTE — PROGRESS NOTES
22  Kristina Cartagena : [id number] Sex: male  Age 56 y.o. Subjective:  Chief Complaint   Patient presents with    Shortness of Breath     since yesterday    Numbness     b/l arm numbness       HPI:   Kristina Cartagena , 56 y.o. male presents to the clinic for evaluation of chest pain and shortness of breath x 2 days. The patient also reports bilateral arm tingling. The patient denies radiating pain to the left/right shoulder, jaw, through the back, or to the opposite side of the chest. The patient reports symptoms improve with rest. The patient has not taken anything for symptoms. The patient reports worsening symptoms over time. The patient denies dizziness, syncope, heart palpitations, or edema. The patient also denies headache, fever,  abdominal pain, and nausea / vomiting / diarrhea. ROS:   Unless otherwise stated in this report the patient's positive and negative responses for review of systems for constitutional, eyes, ENT, cardiovascular, respiratory, gastrointestinal, neurological, , musculoskeletal, and integument systems and related systems to the presenting problem are either stated in the history of present illness or were not pertinent or were negative for the symptoms and/or complaints related to the presenting medical problem. Positives and pertinent negatives as per HPI. All others reviewed and are negative.       PMH:     Past Medical History:   Diagnosis Date    Accelerated hypertension 2017    Arthritis     CAD (coronary artery disease)     Cerebral artery occlusion with cerebral infarction ([facility] [address]     TIA    CHF (congestive heart failure) (HCC)     COPD (chronic obstructive pulmonary disease) ([facility] [address]     Depression with anxiety     Fibromyalgia     GI bleeding     Hyperlipidemia     Hypertension     Myocardial infarct ([facility] [address]     in past, per testing, date unknown    Sleep apnea     cpap    Urinary frequency        Past Surgical History:   Procedure Laterality Date    CARDIAC CATHETERIZATION  03/16/2017    Dr Sebastian Stubbs  04/12/2018    Dr. Shireen Leiva  08/18/2017    CORONARY ANGIOPLASTY WITH STENT PLACEMENT  09/13/2019    rca distal 4.5x 30    CORONARY ANGIOPLASTY WITH STENT PLACEMENT  10/27/2021    [facility]  3.0 x 22 proximal LAD by DR Herman Diaz    ENDOSCOPY, COLON, DIAGNOSTIC  08/18/2017    NC [address] INCL FLUOR GDNCE DX W/CELL WASHG SPX N/A 10/22/2018    DIRECT LARYNGOSCOPY performed by Gabe Zamora DO at [facility]    NC LARYNGOSCOPY,DIRCT,OP SCOPE,BIOPSY N/A 11/1/2018    MICRO LARYNGOSCOPY WITH BIOPSY TRUE VOCAL CORD LESION performed by Kayy Kimble DO at [facility] OR    TONSILLECTOMY         Family History   Problem Relation Age of Onset    Heart Disease Mother     Other Father    Theodoro Milder No Known Problems Sister     No Known Problems Brother     No Known Problems Sister     No Known Problems Brother        Medications:     Current Outpatient Medications:     furosemide (LASIX) 40 MG tablet, TAKE ONE TABLET BY MOUTH EVERY 8 HOURS, Disp: , Rfl:     Semaglutide (RYBELSUS PO), Take by mouth, Disp: , Rfl:     losartan (COZAAR) 100 MG tablet, TAKE ONE TABLET BY MOUTH EVERY DAY AT BEDTIME, Disp: , Rfl:     traZODone (DESYREL) 150 MG tablet, TAKE ONE TABLET BY MOUTH AT BEDTIME, Disp: , Rfl:     spironolactone (ALDACTONE) 25 MG tablet, Take 1 tablet by mouth daily, Disp: 90 tablet, Rfl: 1    atorvastatin (LIPITOR) 40 MG tablet, TAKE ONE TABLET BY MOUTH EVERY DAY, Disp: , Rfl:     aspirin 325 MG EC tablet, Take 1 tablet by mouth once for 1 dose, Disp: 30 tablet, Rfl: 3    albuterol sulfate HFA (VENTOLIN HFA) 108 (90 Base) MCG/ACT inhaler, Inhale 2 puffs into the lungs 4 times daily as needed for Wheezing, Disp: 1 Inhaler, Rfl: 0    QVAR REDIHALER 80 MCG/ACT AERB inhaler, INHALE ONE PUFF BY MOUTH EVERY 12 HOURS WITH SPACER, Disp: , Rfl:     amLODIPine (NORVASC) 10 MG tablet, Take 1 tablet by cyanosis. Pulmonary: Respiratory effort normal.  Normal breath sounds. Abdomen: Soft, nontender, normal bowel sounds. Back:  No costovertebral tenderness. Skin:  Normal turgor. Warm, dry, without visible rash, unless noted elsewhere. Musculoskeletal: General: Normal strength / ROM. Neurological:  Oriented. Motor functions intact. Psychiatric:  Pleasant and appropriate. Mood and affect are normal.    Testing:   (All laboratory and radiology results have been personally reviewed by myself)  Labs:  No results found for this visit on 07/11/22. Imaging: All Radiology results interpreted by Radiologist unless otherwise noted. No orders to display       EKG #1:  Interpreted by me unless otherwise noted. Time:  11:26 AM   Rate: 78  Rhythm: Sinus Rhythm  Interpretation: Abnormal with non specific T wave abnormality  Comparison: Yes    Assessment / Plan:   The patient's vitals, allergies, medications, and past medical history have been reviewed. Marylin Hurst was seen today for shortness of breath and numbness. Diagnoses and all orders for this visit:    Chest pain, unspecified type  -     EKG 12 Lead    Shortness of breath  -     EKG 12 Lead        - Disposition: ED    - Educational material printed for patient's review and were included in patient instructions. After Visit Summary was given to patient at the end of visit. - Vitals reviewed which are stable. EKG performed in office which revealed normal sinus rhythm without the presence of OH segment depression, ST segment elevation.     - Pt advised that a comprehensive workup is unable to be performed in an ready care setting. Pt advised to go straight to the ED for further evaluation and management. Pt agreed with this care plan and agreed to go immediately by private vehicle. Pt was recommended to go by ambulance, but refused. Risk were discussed with the patient. Pt left our office in stable condition. Further disposition to follow.  All questions answered. SIGNATURE: Juanito Gonzalez, YUMIKO-CNP    *NOTE: This report was transcribed using voice recognition software. Every effort was made to ensure accuracy; however, inadvertent computerized transcription errors may be present.

## 2022-11-30 ENCOUNTER — OFFICE VISIT (OUTPATIENT)
Dept: FAMILY MEDICINE CLINIC | Age: 57
End: 2022-11-30
Payer: MEDICARE

## 2022-11-30 VITALS
DIASTOLIC BLOOD PRESSURE: 80 MMHG | WEIGHT: 315 LBS | HEART RATE: 74 BPM | TEMPERATURE: 97.6 F | RESPIRATION RATE: 20 BRPM | BODY MASS INDEX: 42.66 KG/M2 | OXYGEN SATURATION: 97 % | HEIGHT: 72 IN | SYSTOLIC BLOOD PRESSURE: 122 MMHG

## 2022-11-30 DIAGNOSIS — U07.1 COVID-19: Primary | ICD-10-CM

## 2022-11-30 DIAGNOSIS — J44.1 COPD WITH ACUTE EXACERBATION (HCC): ICD-10-CM

## 2022-11-30 DIAGNOSIS — R05.9 COUGH, UNSPECIFIED TYPE: ICD-10-CM

## 2022-11-30 LAB
Lab: ABNORMAL
PERFORMING INSTRUMENT: ABNORMAL
QC PASS/FAIL: ABNORMAL
SARS-COV-2, POC: DETECTED

## 2022-11-30 PROCEDURE — 3078F DIAST BP <80 MM HG: CPT

## 2022-11-30 PROCEDURE — 99214 OFFICE O/P EST MOD 30 MIN: CPT

## 2022-11-30 PROCEDURE — 87426 SARSCOV CORONAVIRUS AG IA: CPT

## 2022-11-30 PROCEDURE — 3074F SYST BP LT 130 MM HG: CPT

## 2022-11-30 RX ORDER — AZITHROMYCIN 250 MG/1
TABLET, FILM COATED ORAL
Qty: 6 TABLET | Refills: 0 | Status: SHIPPED | OUTPATIENT
Start: 2022-11-30

## 2022-11-30 RX ORDER — DEXAMETHASONE 6 MG/1
6 TABLET ORAL DAILY
Qty: 7 TABLET | Refills: 0 | Status: SHIPPED
Start: 2022-11-30 | End: 2022-12-02 | Stop reason: SINTOL

## 2022-11-30 RX ORDER — BENZONATATE 100 MG/1
100 CAPSULE ORAL 3 TIMES DAILY PRN
Qty: 21 CAPSULE | Refills: 0 | Status: SHIPPED | OUTPATIENT
Start: 2022-11-30 | End: 2022-12-07

## 2022-11-30 NOTE — PROGRESS NOTES
Chief Complaint       Cough, Shortness of Breath, and Congestion    History of Present Illness   Source of history provided by:  patient. Ashley Wilson is a 56 y.o. old male presenting to the walk in clinic for evaluation of increased SOB, fatigue, nasal congestion, mil non-productive cough x 3 days. Denies any fever, CP, dyspnea, LE edema, abdominal pain, vomiting, rash, or lethargy. Pt has history of COPD and is current smoker. Patient denies recent sick exposures. Patient has been vaccinated for COVID-19. Patient has been taking nothing OTC without symptomatic relief. ROS    Unless otherwise stated in this report or unable to obtain because of the patient's clinical or mental status as evidenced by the medical record, this patients's positive and negative responses for Review of Systems, constitutional, psych, eyes, ENT, cardiovascular, respiratory, gastrointestinal, neurological, genitourinary, musculoskeletal, integument systems and systems related to the presenting problem are either stated in the preceding or were not pertinent or were negative for the symptoms and/or complaints related to the medical problem. Physical Exam         VS:  /80   Pulse 74   Temp 97.6 °F (36.4 °C) (Temporal)   Resp 20   Ht 6' (1.829 m)   Wt (!) 384 lb (174.2 kg)   SpO2 97%   BMI 52.08 kg/m²    Oxygen Saturation Interpretation: Normal.    Constitutional:  Alert, development consistent with age. NAD. Head:  NC/NT. Airway patent. Mouth: Posterior pharynx with mild erythema and clear postnasal drip. No tonsillar hypertrophy or exudate. Neck:  Normal ROM. Supple. No anterior cervical adenopathy noted. Lungs: CTAB without wheezes, rales, or rhonchi. CV:  Regular rate and rhythm, normal heart sounds, without pathological murmurs, ectopy, gallops, or rubs. Skin:  Normal turgor. Warm, dry, without visible rash. Lymphatic: No lymphangitis or adenopathy noted. Neurological:  Oriented.   Motor functions intact. Lab / Imaging Results   (All laboratory and radiology results have been personally reviewed by myself)  Labs:  No results found for this visit on 11/30/22. Imaging: All Radiology results interpreted by Radiologist unless otherwise noted. Assessment / Plan     Impression(s):  Mikayla Zuniga was seen today for cough, shortness of breath and congestion. Diagnoses and all orders for this visit:    COVID-19  -     POCT COVID-19, Antigen  -     dexamethasone (DECADRON) 6 MG tablet; Take 1 tablet by mouth daily for 7 days  -     azithromycin (ZITHROMAX) 250 MG tablet; Take 2 tablets by mouth on day 1, Take 1 tablet by mouth on days 2-5  -     benzonatate (TESSALON) 100 MG capsule; Take 1 capsule by mouth 3 times daily as needed for Cough    Cough, unspecified type  -     POCT COVID-19, Antigen    COPD with acute exacerbation (HCC)  -     dexamethasone (DECADRON) 6 MG tablet; Take 1 tablet by mouth daily for 7 days    Disposition:  Disposition: Discharge to home. Pt vitals stable. Advised to purchase pulse ox and monitor at home, precautions provided. Advised to continue albuterol prn. Pt has been on steroids in the past and has tolerated medication with DM. Rapid COVID-19 testing is positive in office. Pt should remain out of public for at least 5 days from the start of symptoms. Additional 5 days out in public fully masked. Pt should also be fever free for 24 hours and symptoms should be improved overall prior to returning. Increase fluids and rest. Symptomatic relief discussed including Tylenol prn pain/fever. Schedule f/u with PCP in 7-10 days if symptoms persist. ED sooner if symptoms worsen or change. ED immediately with high or refractory fever, progressive SOB, dyspnea, CP, calf pain/swelling, shaking chills, vomiting, abdominal pain, lethargy, flank pain, or decreased urinary output. Pt verbalizes understanding and is in agreement with plan of care. All questions answered.     Bhavin JESSE Carroll    **This report was transcribed using voice recognition software. Every effort was made to ensure accuracy; however, inadvertent computerized transcription errors may be present.

## 2022-12-02 ENCOUNTER — HOSPITAL ENCOUNTER (EMERGENCY)
Age: 57
Discharge: HOME | End: 2022-12-02
Attending: EMERGENCY MEDICINE
Payer: MEDICARE

## 2022-12-02 ENCOUNTER — APPOINTMENT (OUTPATIENT)
Dept: GENERAL RADIOLOGY | Age: 57
End: 2022-12-02
Payer: MEDICARE

## 2022-12-02 VITALS
HEART RATE: 63 BPM | WEIGHT: 315 LBS | DIASTOLIC BLOOD PRESSURE: 89 MMHG | BODY MASS INDEX: 42.66 KG/M2 | RESPIRATION RATE: 16 BRPM | SYSTOLIC BLOOD PRESSURE: 147 MMHG | TEMPERATURE: 97.6 F | HEIGHT: 72 IN | OXYGEN SATURATION: 98 %

## 2022-12-02 DIAGNOSIS — R73.9 HYPERGLYCEMIA, DRUG-INDUCED: Primary | ICD-10-CM

## 2022-12-02 DIAGNOSIS — T50.905A HYPERGLYCEMIA, DRUG-INDUCED: Primary | ICD-10-CM

## 2022-12-02 LAB
ALBUMIN SERPL-MCNC: 3.8 G/DL (ref 3.5–5.2)
ALP BLD-CCNC: 138 U/L (ref 40–129)
ALT SERPL-CCNC: 30 U/L (ref 0–40)
ANION GAP SERPL CALCULATED.3IONS-SCNC: 14 MMOL/L (ref 7–16)
AST SERPL-CCNC: 22 U/L (ref 0–39)
BACTERIA: ABNORMAL /HPF
BASOPHILS ABSOLUTE: 0.02 E9/L (ref 0–0.2)
BASOPHILS RELATIVE PERCENT: 0.2 % (ref 0–2)
BETA-HYDROXYBUTYRATE: 0.19 MMOL/L (ref 0.02–0.27)
BILIRUB SERPL-MCNC: 0.3 MG/DL (ref 0–1.2)
BILIRUBIN URINE: NEGATIVE
BLOOD, URINE: ABNORMAL
BUN BLDV-MCNC: 29 MG/DL (ref 6–20)
CALCIUM SERPL-MCNC: 10.1 MG/DL (ref 8.6–10.2)
CHLORIDE BLD-SCNC: 92 MMOL/L (ref 98–107)
CHP ED QC CHECK: NORMAL
CHP ED QC CHECK: NORMAL
CLARITY: CLEAR
CO2: 21 MMOL/L (ref 22–29)
COLOR: YELLOW
CREAT SERPL-MCNC: 1 MG/DL (ref 0.7–1.2)
EKG ATRIAL RATE: 64 BPM
EKG P AXIS: 13 DEGREES
EKG P-R INTERVAL: 154 MS
EKG Q-T INTERVAL: 414 MS
EKG QRS DURATION: 84 MS
EKG QTC CALCULATION (BAZETT): 427 MS
EKG R AXIS: -31 DEGREES
EKG T AXIS: 118 DEGREES
EKG VENTRICULAR RATE: 64 BPM
EOSINOPHILS ABSOLUTE: 0.01 E9/L (ref 0.05–0.5)
EOSINOPHILS RELATIVE PERCENT: 0.1 % (ref 0–6)
GFR SERPL CREATININE-BSD FRML MDRD: >60 ML/MIN/1.73
GLUCOSE BLD-MCNC: 600 MG/DL
GLUCOSE BLD-MCNC: 652 MG/DL
GLUCOSE BLD-MCNC: 652 MG/DL (ref 74–99)
GLUCOSE URINE: >=1000 MG/DL
HCT VFR BLD CALC: 43.8 % (ref 37–54)
HEMOGLOBIN: 15 G/DL (ref 12.5–16.5)
IMMATURE GRANULOCYTES #: 0.07 E9/L
IMMATURE GRANULOCYTES %: 0.8 % (ref 0–5)
KETONES, URINE: NEGATIVE MG/DL
LACTIC ACID: 3.5 MMOL/L (ref 0.5–2.2)
LEUKOCYTE ESTERASE, URINE: NEGATIVE
LIPASE: 137 U/L (ref 13–60)
LYMPHOCYTES ABSOLUTE: 1.14 E9/L (ref 1.5–4)
LYMPHOCYTES RELATIVE PERCENT: 12.7 % (ref 20–42)
MCH RBC QN AUTO: 31.5 PG (ref 26–35)
MCHC RBC AUTO-ENTMCNC: 34.2 % (ref 32–34.5)
MCV RBC AUTO: 92 FL (ref 80–99.9)
METER GLUCOSE: 421 MG/DL (ref 74–99)
METER GLUCOSE: 432 MG/DL (ref 74–99)
METER GLUCOSE: >500 MG/DL (ref 74–99)
MONOCYTES ABSOLUTE: 0.56 E9/L (ref 0.1–0.95)
MONOCYTES RELATIVE PERCENT: 6.2 % (ref 2–12)
NEUTROPHILS ABSOLUTE: 7.18 E9/L (ref 1.8–7.3)
NEUTROPHILS RELATIVE PERCENT: 80 % (ref 43–80)
NITRITE, URINE: NEGATIVE
PDW BLD-RTO: 12.8 FL (ref 11.5–15)
PH UA: 5.5 (ref 5–9)
PH VENOUS: 7.36 (ref 7.35–7.45)
PLATELET # BLD: 178 E9/L (ref 130–450)
PMV BLD AUTO: 9.2 FL (ref 7–12)
POTASSIUM SERPL-SCNC: 4.9 MMOL/L (ref 3.5–5)
PRO-BNP: 740 PG/ML (ref 0–125)
PROTEIN UA: NEGATIVE MG/DL
RBC # BLD: 4.76 E12/L (ref 3.8–5.8)
RBC UA: ABNORMAL /HPF (ref 0–2)
SODIUM BLD-SCNC: 127 MMOL/L (ref 132–146)
SPECIFIC GRAVITY UA: <=1.005 (ref 1–1.03)
TOTAL PROTEIN: 7.1 G/DL (ref 6.4–8.3)
TROPONIN, HIGH SENSITIVITY: 32 NG/L (ref 0–11)
UROBILINOGEN, URINE: 0.2 E.U./DL
WBC # BLD: 9 E9/L (ref 4.5–11.5)
WBC UA: ABNORMAL /HPF (ref 0–5)

## 2022-12-02 PROCEDURE — 96376 TX/PRO/DX INJ SAME DRUG ADON: CPT | Performed by: EMERGENCY MEDICINE

## 2022-12-02 PROCEDURE — 83880 ASSAY OF NATRIURETIC PEPTIDE: CPT

## 2022-12-02 PROCEDURE — 84484 ASSAY OF TROPONIN QUANT: CPT

## 2022-12-02 PROCEDURE — 82010 KETONE BODYS QUAN: CPT

## 2022-12-02 PROCEDURE — 93005 ELECTROCARDIOGRAM TRACING: CPT | Performed by: PHYSICIAN ASSISTANT

## 2022-12-02 PROCEDURE — 82800 BLOOD PH: CPT

## 2022-12-02 PROCEDURE — 83605 ASSAY OF LACTIC ACID: CPT

## 2022-12-02 PROCEDURE — 85025 COMPLETE CBC W/AUTO DIFF WBC: CPT

## 2022-12-02 PROCEDURE — 93010 ELECTROCARDIOGRAM REPORT: CPT | Performed by: INTERNAL MEDICINE

## 2022-12-02 PROCEDURE — 81001 URINALYSIS AUTO W/SCOPE: CPT

## 2022-12-02 PROCEDURE — 82962 GLUCOSE BLOOD TEST: CPT

## 2022-12-02 PROCEDURE — 71046 X-RAY EXAM CHEST 2 VIEWS: CPT

## 2022-12-02 PROCEDURE — 80053 COMPREHEN METABOLIC PANEL: CPT

## 2022-12-02 PROCEDURE — 99285 EMERGENCY DEPT VISIT HI MDM: CPT | Performed by: EMERGENCY MEDICINE

## 2022-12-02 PROCEDURE — 2580000003 HC RX 258: Performed by: EMERGENCY MEDICINE

## 2022-12-02 PROCEDURE — 96361 HYDRATE IV INFUSION ADD-ON: CPT | Performed by: EMERGENCY MEDICINE

## 2022-12-02 PROCEDURE — 96374 THER/PROPH/DIAG INJ IV PUSH: CPT | Performed by: EMERGENCY MEDICINE

## 2022-12-02 PROCEDURE — 6370000000 HC RX 637 (ALT 250 FOR IP): Performed by: EMERGENCY MEDICINE

## 2022-12-02 PROCEDURE — 83690 ASSAY OF LIPASE: CPT

## 2022-12-02 RX ORDER — SODIUM CHLORIDE 9 MG/ML
INJECTION, SOLUTION INTRAVENOUS CONTINUOUS
Status: DISCONTINUED | OUTPATIENT
Start: 2022-12-02 | End: 2022-12-02

## 2022-12-02 RX ORDER — 0.9 % SODIUM CHLORIDE 0.9 %
1000 INTRAVENOUS SOLUTION INTRAVENOUS ONCE
Status: COMPLETED | OUTPATIENT
Start: 2022-12-02 | End: 2022-12-02

## 2022-12-02 RX ADMIN — SODIUM CHLORIDE: 9 INJECTION, SOLUTION INTRAVENOUS at 12:31

## 2022-12-02 RX ADMIN — Medication 8 UNITS: at 11:30

## 2022-12-02 RX ADMIN — INSULIN HUMAN 8 UNITS: 100 INJECTION, SOLUTION PARENTERAL at 12:51

## 2022-12-02 RX ADMIN — SODIUM CHLORIDE 1000 ML: 9 INJECTION, SOLUTION INTRAVENOUS at 11:07

## 2022-12-02 ASSESSMENT — PAIN DESCRIPTION - DESCRIPTORS: DESCRIPTORS: ACHING

## 2022-12-02 ASSESSMENT — ENCOUNTER SYMPTOMS
VOMITING: 0
RHINORRHEA: 0
NAUSEA: 1
COUGH: 1
ABDOMINAL PAIN: 0
WHEEZING: 0
DIARRHEA: 0
SORE THROAT: 0
STRIDOR: 0
SHORTNESS OF BREATH: 0

## 2022-12-02 ASSESSMENT — PAIN SCALES - GENERAL: PAINLEVEL_OUTOF10: 6

## 2022-12-02 ASSESSMENT — PAIN DESCRIPTION - PAIN TYPE: TYPE: ACUTE PAIN

## 2022-12-02 ASSESSMENT — PAIN - FUNCTIONAL ASSESSMENT: PAIN_FUNCTIONAL_ASSESSMENT: 0-10

## 2022-12-02 ASSESSMENT — PAIN DESCRIPTION - LOCATION: LOCATION: GENERALIZED

## 2022-12-02 NOTE — ED NOTES
Department of Emergency Medicine  FIRST PROVIDER TRIAGE NOTE             Independent MLP           12/2/22  8:28 AM EST    Date of Encounter: 12/2/22   MRN: [id number]      HPI: Faisal Sultana is a 56 y.o. male who presents to the ED for Hyperglycemia (Blood sugar was 585 last night and then meter read \"hi\" this morning. Increased thirst w/dizziness. Started taking prednisone Wednesday for COVID sx.)     Patient is a 82-year-old presenting for hyperglycemia. Patient has had multiple medication changes from going from Ozempic to oral metformin to multiple changes. Patient called his family doctor who wrote him a new list.  Patient states that due to the medication changes and then he was recently placed on steroids after getting COVID on Monday. Patient was placed on a Z-Gregory and prednisone. Patient states he does know if his sugars are high because of the medication changes going from an noninsulin based injectable to metformin also by adding the prednisone. Patient states that his sugar read over 600 today    ROS: Negative for vomiting, diarrhea, urinary complaints, or rash. PE: Gen Appearance/Constitutional: alert  HEENT: NC/NT. PERRLA,  Airway patent. Neck: supple     Initial Plan of Care: All treatment areas with department are currently occupied. Plan to order/Initiate the following while awaiting opening in ED: labs, EKG, imaging studies, and COVID-19 testing.   Initiate Treatment-Testing, Proceed toTreatment Area When Bed Available for ED Attending/MLP to Continue Care    Electronically signed by Jessie Coley PA-C   DD: 12/2/22       Jessie Coley PA-C  12/02/22 2731

## 2022-12-02 NOTE — Clinical Note
Patient blood sugar is coming down gradually. He has no symptoms. He's eager to go home. He was advised to stop his steroid medication and drinks plenty of water. He acknowledged understanding.

## 2022-12-02 NOTE — ED PROVIDER NOTES
[facility]  Department of Emergency Medicine     Written by: Yan Prieto MD  Patient Name: Belkis Marin  Attending Provider: Víctor Valdovinos MD  Admit Date: 2022  8:07 AM  MRN: [id number]                   : 1965        Chief Complaint   Patient presents with    Hyperglycemia     Blood sugar was 585 last night and then meter read \"hi\" this morning. Increased thirst w/dizziness. Started taking prednisone Wednesday for COVID sx.    - Chief complaint    Patient is a 55 y/o male with past medical history of CHF and T2DM presenting to ED after seen glucose level over 600 on his glucometer. Patient had been traveling earlier this week and out deer hunting, but felt fatigued and was experiencing mild cough. He tested positive for COVID using a home kit. He decided to seek care at urgent care on Wednesday. He was prescribed Decadron 6 mg daily for 7 days. Today he got  blood glucose > 600 and decided to come to ED. Review of Systems   Constitutional:  Positive for fatigue. Negative for chills and fever. HENT:  Negative for congestion, rhinorrhea and sore throat. Respiratory:  Positive for cough. Negative for shortness of breath, wheezing and stridor. Cardiovascular:  Negative for chest pain and leg swelling. Gastrointestinal:  Positive for nausea. Negative for abdominal pain, diarrhea and vomiting. Endocrine: Positive for polyuria. Musculoskeletal:  Positive for myalgias. Skin: Negative. Neurological:  Negative for dizziness, light-headedness and headaches. Psychiatric/Behavioral: Negative. Physical Exam  Constitutional:       General: He is not in acute distress. Appearance: Normal appearance. He is obese. He is not ill-appearing. HENT:      Head: Atraumatic. Nose: No congestion or rhinorrhea. Eyes:      General:         Right eye: No discharge. Left eye: No discharge.       Conjunctiva/sclera: Conjunctivae normal. Cardiovascular:      Rate and Rhythm: Normal rate and regular rhythm. Pulses: Normal pulses. Heart sounds: Normal heart sounds. Pulmonary:      Effort: Pulmonary effort is normal. No respiratory distress. Breath sounds: Normal breath sounds. Abdominal:      General: There is no distension. Palpations: Abdomen is soft. Tenderness: There is no abdominal tenderness. Musculoskeletal:      Right lower leg: No edema. Left lower leg: No edema. Lymphadenopathy:      Cervical: No cervical adenopathy. Skin:     Findings: No rash. Neurological:      General: No focal deficit present. Motor: No weakness. Psychiatric:         Mood and Affect: Mood normal.         Behavior: Behavior normal.        Procedures       MDM  Number of Diagnoses or Management Options  Hyperglycemia, drug-induced  Diagnosis management comments: Patient received IV fluids. Labs and imaging reviewed. On reevaluation, he is resting comfortably. No symptoms or complaints. Concern the patient's hyperglycemia and glucosuria are the result of steroid use in a diabetic patient without insulin. IV Insulin injection and fluid resuscitation given. Blood sugar reassuring on reevaluation. He is not in DKA. He has not have an ion gap. He is overall well-appearing. He ambulates without problem. Would like to go home. Patient is instructed to stop the steroids. He is to monitor his blood sugars closely the next few days. He voices understanding. Is to follow with his PCP in 1 to 2 days. He is educated on signs and symptoms that require emergent evaluation.        Amount and/or Complexity of Data Reviewed  Clinical lab tests: ordered and reviewed  Tests in the radiology section of CPT®: ordered and reviewed  Review and summarize past medical records: yes  Discuss the patient with other providers: yes  Independent visualization of images, tracings, or specimens: yes    Risk of Complications, Morbidity, and/or Mortality  Presenting problems: low  Diagnostic procedures: low  Management options: moderate    Patient Progress  Patient progress: resolved        Patient was seen and evaluated by myself and my attending Fatou Beckwith MD. Assessment and Plan discussed with attending provider, please see attestation for final plan of care. MD Rojelio Mcginnis MD  Resident  12/02/22 [id number]       --------------------------------- IMPRESSION AND DISPOSITION ---------------------------------    IMPRESSION  1. Hyperglycemia, drug-induced        DISPOSITION  Disposition: Discharge to home  Patient condition is stable      NOTE: This report was transcribed using voice recognition software. Every effort was made to ensure accuracy; however, inadvertent computerized transcription errors may be present    ATTENDING PROVIDER ATTESTATION:     Mauricio Salgado presented to the emergency department for evaluation of Hyperglycemia (Blood sugar was 585 last night and then meter read \"hi\" this morning. Increased thirst w/dizziness. Started taking prednisone Wednesday for COVID sx.)   and was initially evaluated by the Medical Resident. See Original ED Note for H&P and ED course above. I have reviewed and discussed the case, including pertinent history (medical, surgical, family and social) and exam findings with the Medical Resident assigned to Mauricio Salgado. I have personally performed and/or participated in the history, exam, medical decision making, EKG interpretation and procedures and agree with all pertinent clinical information. I have reviewed my findings and recommendations with the assigned Medical Resident, Mauricio Salgado and members of family present at the time of disposition. My findings/plan: The encounter diagnosis was Hyperglycemia, drug-induced.   Discharge Medication List as of 12/2/2022  2:18 PM        MD Fatou Meyers MD  12/03/22 [id number]

## 2022-12-02 NOTE — ED NOTES
Patient ambulated to the bathroom and back to room on RA. SpO2 maintained 97-95%. Patient's HR maintained 60-80 BPM. Patient stated, \"I have been a little lightheaded when walking recently, but how much longer do I need to be here? \"     Paris Prieto RN  12/02/22 [id number]

## 2022-12-14 ENCOUNTER — OFFICE VISIT (OUTPATIENT)
Dept: FAMILY MEDICINE CLINIC | Age: 57
End: 2022-12-14
Payer: MEDICARE

## 2022-12-14 VITALS
SYSTOLIC BLOOD PRESSURE: 138 MMHG | RESPIRATION RATE: 22 BRPM | WEIGHT: 315 LBS | HEIGHT: 72 IN | HEART RATE: 67 BPM | BODY MASS INDEX: 42.66 KG/M2 | DIASTOLIC BLOOD PRESSURE: 84 MMHG | OXYGEN SATURATION: 98 % | TEMPERATURE: 97.2 F

## 2022-12-14 DIAGNOSIS — R51.9 NONINTRACTABLE HEADACHE, UNSPECIFIED CHRONICITY PATTERN, UNSPECIFIED HEADACHE TYPE: ICD-10-CM

## 2022-12-14 DIAGNOSIS — R20.0 NUMBNESS AND TINGLING IN BOTH HANDS: ICD-10-CM

## 2022-12-14 DIAGNOSIS — R73.9 HYPERGLYCEMIA: Primary | ICD-10-CM

## 2022-12-14 DIAGNOSIS — R11.0 NAUSEA: ICD-10-CM

## 2022-12-14 DIAGNOSIS — R06.02 SOB (SHORTNESS OF BREATH): ICD-10-CM

## 2022-12-14 DIAGNOSIS — R20.2 NUMBNESS AND TINGLING IN BOTH HANDS: ICD-10-CM

## 2022-12-14 LAB
CHP ED QC CHECK: ABNORMAL
GLUCOSE BLD-MCNC: 324 MG/DL

## 2022-12-14 PROCEDURE — 82962 GLUCOSE BLOOD TEST: CPT

## 2022-12-14 PROCEDURE — 3078F DIAST BP <80 MM HG: CPT

## 2022-12-14 PROCEDURE — 3074F SYST BP LT 130 MM HG: CPT

## 2022-12-14 PROCEDURE — 99214 OFFICE O/P EST MOD 30 MIN: CPT

## 2022-12-14 NOTE — PROGRESS NOTES
Chief Complaint       Eye Pain (Pain behind left eye started a couple days ago. Has taken a lot of tylenol ), Nausea (Started yesterday. Denies any vomiting. Feeling more tired), and Cough    History of Present Illness   Source of history provided by:  patient. Nguyễn Hernandes is a 56 y.o. male presenting to the walk in clinic for evaluation of headache, pain behind left eye, nausea, fatigue, and numbness in bilateral hands. He reports increased SOB. Pt tested positive for COVID-19 on 11/30 and was then seen in ER for hyperglycemia on 12/2. He was seen by PCP yesterday and states he was started on insulin. Pt reports his symptoms are worsening and headache is described as the worst of his life. Patient has tried taking Tylenol at home without symptomatic relief. ROS    Unless otherwise stated in this report or unable to obtain because of the patient's clinical or mental status as evidenced by the medical record, this patients's positive and negative responses for Review of Systems, constitutional, psych, eyes, ENT, cardiovascular, respiratory, gastrointestinal, neurological, genitourinary, musculoskeletal, integument systems and systems related to the presenting problem are either stated in the preceding or were not pertinent or were negative for the symptoms and/or complaints related to the medical problem. Physical Exam         VS:  /84 (Site: Right Upper Arm, Position: Sitting, Cuff Size: Large Adult)   Pulse 67   Temp 97.2 °F (36.2 °C) (Temporal)   Resp 22   Ht 6' (1.829 m)   Wt (!) 372 lb (168.7 kg)   SpO2 98%   BMI 50.45 kg/m²    Oxygen Saturation Interpretation: Normal.  Constitutional:  Alert, development consistent with age. Eyes:  PERRL, EOMI, no discharge or conjunctival injection. Ears:  External ears without lesions. TM's clear without erythema or perforation bilaterally. Throat:  Pharynx without injection, exudate, or tonsillar hypertrophy. Airway patient.   Neck: Normal ROM. Supple. Lungs:  Clear to auscultation and breath sounds equal.  Heart:  Regular rate and rhythm, normal heart sounds, without pathological murmurs, ectopy, gallops, or rubs. Abdomen:  Soft, nontender, good bowel sounds. No firm or pulsatile mass. Back:  No costovertebral tenderness. Skin:  Normal turgor. Warm, dry, without visible rash, unless noted elsewhere. Neurological:  Alert and oriented. Motor functions intact. Lab / Imaging Results   (All laboratory and radiology results have been personally reviewed by myself)  Labs:  Results for orders placed or performed in visit on 12/14/22   POCT Glucose   Result Value Ref Range    Glucose 324 mg/dL    QC OK? Imaging: All Radiology results interpreted by Radiologist unless otherwise noted. Assessment / Plan     Impression(s):  Ruchi Liu was seen today for eye pain, nausea and cough. Diagnoses and all orders for this visit:    Hyperglycemia  -     POCT Glucose    Nausea  -     POCT Glucose    Numbness and tingling in both hands    Nonintractable headache, unspecified chronicity pattern, unspecified headache type    SOB (shortness of breath)    Disposition:  Disposition: Discharge to ED. Pt glucose continues to remain elevated, 324. Has improved since ER visit on 12/2. He states his readings have \"been ever higher\" at home. He has been using insulin as well as oral medications as directed as per pt. Due to ongoing hyperglycemia and current symptoms I would like pt to have comprehensive workup at the ER including blood work and imagining that cannot be performed in the walk-in setting at this time. Continued to offer and recommend EMS transport he declined and agreeable to go by private vehicle. Patient states understanding is in agreement with this care plan. All questions answered. JESSE Meraz    **This report was transcribed using voice recognition software.  Every effort was made to ensure accuracy; however, inadvertent computerized transcription errors may be present.

## 2023-01-12 ENCOUNTER — APPOINTMENT (OUTPATIENT)
Dept: CT IMAGING | Age: 58
DRG: 193 | End: 2023-01-12
Payer: MEDICARE

## 2023-01-12 ENCOUNTER — APPOINTMENT (OUTPATIENT)
Dept: GENERAL RADIOLOGY | Age: 58
DRG: 193 | End: 2023-01-12
Payer: MEDICARE

## 2023-01-12 ENCOUNTER — HOSPITAL ENCOUNTER (INPATIENT)
Age: 58
LOS: 1 days | Discharge: HOME | DRG: 193 | End: 2023-01-13
Attending: EMERGENCY MEDICINE | Admitting: INTERNAL MEDICINE
Payer: MEDICARE

## 2023-01-12 DIAGNOSIS — J96.01 ACUTE RESPIRATORY FAILURE WITH HYPOXIA (HCC): Primary | ICD-10-CM

## 2023-01-12 DIAGNOSIS — J10.1 INFLUENZA A: ICD-10-CM

## 2023-01-12 PROBLEM — J96.00 ACUTE RESPIRATORY FAILURE (HCC): Status: ACTIVE | Noted: 2023-01-12

## 2023-01-12 LAB
ALBUMIN SERPL-MCNC: 4.1 G/DL (ref 3.5–5.2)
ALP BLD-CCNC: 85 U/L (ref 40–129)
ALT SERPL-CCNC: 36 U/L (ref 0–40)
ANION GAP SERPL CALCULATED.3IONS-SCNC: 15 MMOL/L (ref 7–16)
AST SERPL-CCNC: 59 U/L (ref 0–39)
BASOPHILS ABSOLUTE: 0.02 E9/L (ref 0–0.2)
BASOPHILS RELATIVE PERCENT: 0.3 % (ref 0–2)
BILIRUB SERPL-MCNC: 0.6 MG/DL (ref 0–1.2)
BUN BLDV-MCNC: 15 MG/DL (ref 6–20)
CALCIUM SERPL-MCNC: 9.1 MG/DL (ref 8.6–10.2)
CHLORIDE BLD-SCNC: 97 MMOL/L (ref 98–107)
CO2: 22 MMOL/L (ref 22–29)
CREAT SERPL-MCNC: 1.2 MG/DL (ref 0.7–1.2)
EOSINOPHILS ABSOLUTE: 0 E9/L (ref 0.05–0.5)
EOSINOPHILS RELATIVE PERCENT: 0 % (ref 0–6)
GFR SERPL CREATININE-BSD FRML MDRD: >60 ML/MIN/1.73
GLUCOSE BLD-MCNC: 227 MG/DL (ref 74–99)
HCT VFR BLD CALC: 41.6 % (ref 37–54)
HEMOGLOBIN: 14.2 G/DL (ref 12.5–16.5)
IMMATURE GRANULOCYTES #: 0.06 E9/L
IMMATURE GRANULOCYTES %: 0.8 % (ref 0–5)
INFLUENZA A BY PCR: DETECTED
INFLUENZA B BY PCR: NOT DETECTED
LYMPHOCYTES ABSOLUTE: 0.64 E9/L (ref 1.5–4)
LYMPHOCYTES RELATIVE PERCENT: 8.8 % (ref 20–42)
MAGNESIUM: 1.5 MG/DL (ref 1.6–2.6)
MCH RBC QN AUTO: 31.9 PG (ref 26–35)
MCHC RBC AUTO-ENTMCNC: 34.1 % (ref 32–34.5)
MCV RBC AUTO: 93.5 FL (ref 80–99.9)
MONOCYTES ABSOLUTE: 0.6 E9/L (ref 0.1–0.95)
MONOCYTES RELATIVE PERCENT: 8.2 % (ref 2–12)
NEUTROPHILS ABSOLUTE: 5.99 E9/L (ref 1.8–7.3)
NEUTROPHILS RELATIVE PERCENT: 81.9 % (ref 43–80)
PDW BLD-RTO: 13.5 FL (ref 11.5–15)
PLATELET # BLD: 148 E9/L (ref 130–450)
PMV BLD AUTO: 9.3 FL (ref 7–12)
POTASSIUM SERPL-SCNC: 3.5 MMOL/L (ref 3.5–5)
PRO-BNP: 1218 PG/ML (ref 0–125)
RBC # BLD: 4.45 E12/L (ref 3.8–5.8)
SARS-COV-2, NAAT: NOT DETECTED
SODIUM BLD-SCNC: 134 MMOL/L (ref 132–146)
TOTAL PROTEIN: 7.3 G/DL (ref 6.4–8.3)
TROPONIN, HIGH SENSITIVITY: 40 NG/L (ref 0–11)
TROPONIN, HIGH SENSITIVITY: 42 NG/L (ref 0–11)
WBC # BLD: 7.3 E9/L (ref 4.5–11.5)

## 2023-01-12 PROCEDURE — 87502 INFLUENZA DNA AMP PROBE: CPT

## 2023-01-12 PROCEDURE — 84484 ASSAY OF TROPONIN QUANT: CPT

## 2023-01-12 PROCEDURE — 87635 SARS-COV-2 COVID-19 AMP PRB: CPT

## 2023-01-12 PROCEDURE — 6360000002 HC RX W HCPCS: Performed by: EMERGENCY MEDICINE

## 2023-01-12 PROCEDURE — 94640 AIRWAY INHALATION TREATMENT: CPT

## 2023-01-12 PROCEDURE — 94664 DEMO&/EVAL PT USE INHALER: CPT

## 2023-01-12 PROCEDURE — 93005 ELECTROCARDIOGRAM TRACING: CPT | Performed by: PHYSICIAN ASSISTANT

## 2023-01-12 PROCEDURE — 1200000000 HC SEMI PRIVATE

## 2023-01-12 PROCEDURE — 71045 X-RAY EXAM CHEST 1 VIEW: CPT

## 2023-01-12 PROCEDURE — 36415 COLL VENOUS BLD VENIPUNCTURE: CPT

## 2023-01-12 PROCEDURE — 71275 CT ANGIOGRAPHY CHEST: CPT

## 2023-01-12 PROCEDURE — 83880 ASSAY OF NATRIURETIC PEPTIDE: CPT

## 2023-01-12 PROCEDURE — 99285 EMERGENCY DEPT VISIT HI MDM: CPT

## 2023-01-12 PROCEDURE — 94660 CPAP INITIATION&MGMT: CPT

## 2023-01-12 PROCEDURE — 96375 TX/PRO/DX INJ NEW DRUG ADDON: CPT

## 2023-01-12 PROCEDURE — 83735 ASSAY OF MAGNESIUM: CPT

## 2023-01-12 PROCEDURE — 85025 COMPLETE CBC W/AUTO DIFF WBC: CPT

## 2023-01-12 PROCEDURE — 6370000000 HC RX 637 (ALT 250 FOR IP): Performed by: EMERGENCY MEDICINE

## 2023-01-12 PROCEDURE — 96365 THER/PROPH/DIAG IV INF INIT: CPT

## 2023-01-12 PROCEDURE — 6360000004 HC RX CONTRAST MEDICATION: Performed by: RADIOLOGY

## 2023-01-12 PROCEDURE — 6370000000 HC RX 637 (ALT 250 FOR IP)

## 2023-01-12 PROCEDURE — 80053 COMPREHEN METABOLIC PANEL: CPT

## 2023-01-12 RX ORDER — MAGNESIUM SULFATE IN WATER 40 MG/ML
2000 INJECTION, SOLUTION INTRAVENOUS ONCE
Status: COMPLETED | OUTPATIENT
Start: 2023-01-12 | End: 2023-01-13

## 2023-01-12 RX ORDER — OSELTAMIVIR PHOSPHATE 75 MG/1
75 CAPSULE ORAL 2 TIMES DAILY
Status: DISCONTINUED | OUTPATIENT
Start: 2023-01-12 | End: 2023-01-13 | Stop reason: HOSPADM

## 2023-01-12 RX ORDER — KETOROLAC TROMETHAMINE 30 MG/ML
15 INJECTION, SOLUTION INTRAMUSCULAR; INTRAVENOUS ONCE
Status: COMPLETED | OUTPATIENT
Start: 2023-01-12 | End: 2023-01-12

## 2023-01-12 RX ORDER — ACETAMINOPHEN 500 MG
1000 TABLET ORAL ONCE
Status: COMPLETED | OUTPATIENT
Start: 2023-01-12 | End: 2023-01-12

## 2023-01-12 RX ORDER — IPRATROPIUM BROMIDE AND ALBUTEROL SULFATE 2.5; .5 MG/3ML; MG/3ML
3 SOLUTION RESPIRATORY (INHALATION) ONCE
Status: COMPLETED | OUTPATIENT
Start: 2023-01-12 | End: 2023-01-12

## 2023-01-12 RX ADMIN — ACETAMINOPHEN 1000 MG: 500 TABLET, FILM COATED ORAL at 20:49

## 2023-01-12 RX ADMIN — MAGNESIUM SULFATE HEPTAHYDRATE 2000 MG: 40 INJECTION, SOLUTION INTRAVENOUS at 22:50

## 2023-01-12 RX ADMIN — IOPAMIDOL 75 ML: 755 INJECTION, SOLUTION INTRAVENOUS at 21:46

## 2023-01-12 RX ADMIN — IPRATROPIUM BROMIDE AND ALBUTEROL SULFATE 3 AMPULE: 2.5; .5 SOLUTION RESPIRATORY (INHALATION) at 19:32

## 2023-01-12 RX ADMIN — KETOROLAC TROMETHAMINE 15 MG: 30 INJECTION, SOLUTION INTRAMUSCULAR; INTRAVENOUS at 20:49

## 2023-01-12 RX ADMIN — NITROGLYCERIN 0.5 INCH: 20 OINTMENT TOPICAL at 19:23

## 2023-01-12 ASSESSMENT — PAIN SCALES - GENERAL: PAINLEVEL_OUTOF10: 8

## 2023-01-12 ASSESSMENT — PAIN - FUNCTIONAL ASSESSMENT: PAIN_FUNCTIONAL_ASSESSMENT: 0-10

## 2023-01-12 ASSESSMENT — PAIN DESCRIPTION - LOCATION: LOCATION: CHEST

## 2023-01-12 NOTE — Clinical Note
Discharge Plan[de-identified] Other/[facility])   Telemetry/Cardiac Monitoring Required?: Yes

## 2023-01-12 NOTE — ED NOTES
Department of Emergency Medicine  FIRST PROVIDER TRIAGE NOTE             Independent MLP           1/12/23  5:28 PM EST    Date of Encounter: 1/12/23   MRN: [id number]      HPI: Mauricio Salgado is a 56 y.o. male who presents to the ED for No chief complaint on file. Pt presenting with sob, chest tightness, cough x 2 days    ROS: Negative for fever, vomiting, or diarrhea. PE: Gen Appearance/Constitutional: alert  HEENT: NC/NT. PERRLA,  Airway patent. Initial Plan of Care: All treatment areas with department are currently occupied. Plan to order/Initiate the following while awaiting opening in ED: labs, EKG, and imaging studies.   Initiate Treatment-Testing, Proceed toTreatment Area When Bed Available for ED Attending/MLP to Continue Care    Electronically signed by Dora Reinoso PA-C   DD: 1/12/23      Dora Reinoso PA-C  01/12/23 [id number]

## 2023-01-12 NOTE — ED PROVIDER NOTES
Name: Mer Barry    MRN: [id number]     Date / Time Roomed:  1/12/2023  5:40 PM  ED Bed Assignment:  37/37    ------------------ History of Present Illness --------------------  1/12/23, Time: 6:50 PM EST   Chief Complaint   Patient presents with    Shortness of Breath     Symptoms x 2 days      Cough      HPI    Mer Barry is a 56 y.o. male, with hx of coronary artery disease with stent placement, COPD, congestive heart failure, who presents to the ED today for shortness of breath, which began 2 days ago. 2 days ago patient had developed shortness of breath and cough. States that his shortness of breath and cough has become progressively worse notes that he does have chest pain when he does cough. He says at rest he does have some substernal chest pain however it is more noticeable when he coughs and takes a deep breath. Says he has chest tightness but not chest heaviness, states he does not feel like his prior cardiac events. Is noted that patient is hypoxic on room air with an oxygen saturation of 90. PCP: Malaika Ramírez MD.    -------------------- PMH --------------------  Past Medical History:  has a past medical history of Accelerated hypertension, Arthritis, CAD (coronary artery disease), Cerebral artery occlusion with cerebral infarction ([facility] [address] [facility]estive [facility] COPD (chronic obstructive pulmonary disease) [facility] Depression with anxiety, Fibromyalgia, GI bleeding, Hyperlipidemia, Hypertension, Myocardial infarct [facility]), Sleep apnea, and Urinary frequency. Past Surgical History:  has a past surgical history that includes Tonsillectomy; Colonoscopy (08/18/2017); Endoscopy, colon, diagnostic (08/18/2017); Cardiac catheterization (03/16/2017); Cardiac catheterization (04/12/2018); pr [facility] incl fluor gdnce dx w/cell washg spx (N/A, 10/22/2018); pr laryngoscopy w/biopsy microscope/telescope (N/A, 11/1/2018);  Coronary angioplasty with stent (09/13/2019); and Coronary angioplasty with stent (10/27/2021). Social History:  reports that he has been smoking cigarettes. He has a 16.50 pack-year smoking history. He has never used smokeless tobacco. He reports current alcohol use of about 6.0 standard drinks per week. He reports current drug use. Drug: Marijuana [place]). Family History: family history includes Heart Disease in his mother; No Known Problems in his brother, brother, sister, and sister; Other in his father. Allergies: Pcn [penicillins]    The patients past medical history has been reviewed.     -------------------- Current Meds --------------------  Meds:   Current Facility-Administered Medications:     amLODIPine (NORVASC) tablet 10 mg, 10 mg, Oral, Daily, aJtin Moise MD, 10 mg at 01/13/23 0818    atorvastatin (LIPITOR) tablet 40 mg, 40 mg, Oral, Daily, Jatin Moise MD, 40 mg at 01/13/23 0818    bumetanide (BUMEX) tablet 1 mg, 1 mg, Oral, Daily, Jatin Moise MD, 1 mg at 01/13/23 0818    losartan (COZAAR) tablet 100 mg, 100 mg, Oral, Daily, Jatin Moise MD, 100 mg at 01/13/23 0818    metoprolol succinate (TOPROL XL) extended release tablet 50 mg, 50 mg, Oral, BID, Jatin Moise MD, 50 mg at 01/13/23 4808    spironolactone (ALDACTONE) tablet 25 mg, 25 mg, Oral, Daily, Jatin Moise MD, 25 mg at 01/13/23 0819    traZODone (DESYREL) tablet 50 mg, 50 mg, Oral, Nightly, Jatin Moise MD    insulin lispro (HUMALOG) injection vial 0-8 Units, 0-8 Units, SubCUTAneous, TID WC, Jatin Moise MD    insulin lispro (HUMALOG) injection vial 0-4 Units, 0-4 Units, SubCUTAneous, Nightly, Jatin Moise MD    glucose chewable tablet 16 g, 4 tablet, Oral, PRN, Jatin Moise MD    dextrose bolus 10% 125 mL, 125 mL, IntraVENous, PRN **OR** dextrose bolus 10% 250 mL, 250 mL, IntraVENous, PRN, Jatin Moise MD    glucagon (rDNA) injection 1 mg, 1 mg, SubCUTAneous, PRN, Jatin Moise MD    dextrose 10 % infusion, , IntraVENous, Continuous PRN, Jatin Moise MD enoxaparin (LOVENOX) injection 40 mg, 40 mg, SubCUTAneous, BID, Lucina Leyva MD, 40 mg at 01/13/23 0820    acetaminophen (TYLENOL) tablet 500 mg, 500 mg, Oral, Q4H PRN, Lucina Leyva MD, 500 mg at 01/13/23 0818    ipratropium-albuterol (DUONEB) nebulizer solution 1 ampule, 1 ampule, Inhalation, 4x daily, Lucina Leyva MD, 1 ampule at 01/13/23 0730    melatonin tablet 3 mg, 3 mg, Oral, Nightly PRN, Lucina Leyva MD    budesonide (PULMICORT) nebulizer suspension 500 mcg, 0.5 mg, Nebulization, BID, 500 mcg at 01/13/23 0730 **AND** arformoterol tartrate (BROVANA) nebulizer solution 15 mcg, 15 mcg, Nebulization, BID, Lucina Leyva MD, 15 mcg at 01/13/23 0730    oseltamivir (TAMIFLU) capsule 75 mg, 75 mg, Oral, BID, Delma Wu MD, 75 mg at 01/13/23 0780    Current Outpatient Medications:     aspirin 81 MG EC tablet, Take 162 mg by mouth daily, Disp: , Rfl:     furosemide (LASIX) 40 MG tablet, Take 40 mg by mouth every evening, Disp: , Rfl:     melatonin 5 MG TABS tablet, Take 10 mg by mouth nightly, Disp: , Rfl:     insulin detemir (LEVEMIR FLEXTOUCH) 100 UNIT/ML injection pen, Inject 258 Units into the skin every morning, Disp: , Rfl:     furosemide (LASIX) 40 MG tablet, Take 80 mg by mouth every morning, Disp: , Rfl:     Semaglutide 7 MG TABS, Take 7 mg by mouth daily, Disp: , Rfl:     losartan (COZAAR) 100 MG tablet, Take 100 mg by mouth nightly, Disp: , Rfl:     traZODone (DESYREL) 150 MG tablet, Take 75 mg by mouth at bedtime, Disp: , Rfl:     spironolactone (ALDACTONE) 25 MG tablet, Take 1 tablet by mouth daily, Disp: 90 tablet, Rfl: 1    atorvastatin (LIPITOR) 80 MG tablet, Take 80 mg by mouth daily, Disp: , Rfl:     metFORMIN (GLUCOPHAGE) 1000 MG tablet, Take 1,000 mg by mouth 2 times daily (with meals), Disp: , Rfl:     albuterol sulfate HFA (VENTOLIN HFA) 108 (90 Base) MCG/ACT inhaler, Inhale 2 puffs into the lungs 4 times daily as needed for Wheezing, Disp: 1 Inhaler, Rfl: 0    QVAR REDIHALER 80 MCG/ACT AERB inhaler, Inhale 1 puff into the lungs 2 times daily, Disp: , Rfl:     amLODIPine (NORVASC) 10 MG tablet, Take 1 tablet by mouth daily, Disp: 90 tablet, Rfl: 5    budesonide-formoterol (SYMBICORT) 160-4.5 MCG/ACT AERO, Inhale 2 puffs into the lungs 2 times daily, Disp: 1 Inhaler, Rfl: 3     The patients home medications have been reviewed. -------------------- PE --------------------  Physical Exam  Constitutional:       Appearance: Normal appearance. Eyes:      Pupils: Pupils are equal, round, and reactive to light. Cardiovascular:      Rate and Rhythm: Normal rate and regular rhythm. Pulses: Normal pulses. Heart sounds: Normal heart sounds. No murmur heard. Pulmonary:      Effort: Pulmonary effort is normal.      Breath sounds: Normal breath sounds. No wheezing or rhonchi. Abdominal:      General: Abdomen is flat. There is no distension. Palpations: Abdomen is soft. Tenderness: There is no abdominal tenderness. Musculoskeletal:         General: Normal range of motion. Cervical back: Normal range of motion. Skin:     General: Skin is warm and dry. Capillary Refill: Capillary refill takes less than 2 seconds. Neurological:      General: No focal deficit present. Mental Status: He is alert and oriented to person, place, and time. Psychiatric:         Mood and Affect: Mood normal.         Behavior: Behavior normal.      -------------------- MDM --------------------    Patient presents from home for shortness of breath that has been occurring for the past 2 days. States that his shortness of breath is gradually become worse notes that he is starting to have chest pain diffusely in his chest appears to be more pleuritic notes that he has the greatest amount of pain in his sternum most notable when he takes a deep breath or coughs. States that the pain is worse with coughing, also notes that it gives him a headache when he does cough.   Patient is noted to be hypoxic on room air with an oxygen saturation of 90 however with 2 L nasal cannula this does come up to 95. Patient was noted not to be tachycardic and has no cardiac symptoms at this time. Given this CTA of the chest was ordered to rule out pulmonary embolism. We will awaiting imaging lab results have come back, troponin appears to be around baseline but we will be repeating it. It is noted that patient is flu a positive, he will be started on Tamiflu. Consult was placed to internal medicine for further evaluation of patient    /80   Pulse 93   Temp 98.9 °F (37.2 °C) (Temporal)   Resp 18   Wt (!) 365 lb (165.6 kg)   SpO2 95%   BMI 49.50 kg/m²     Exam remarkable for patient appearing uncomfortable and short of breath    Diagnoses considered, but not limited to, include influenza infection, PE, coronary event. EKG Interpretation  Interpreted by emergency department physician. 1/12/23  Time: 1743    Rate: 93  MA: 174  QRS: 84  Qtc: 455  Rhythm: Sinus rhythm  Clinical Impression: Sinus rhythm with some nonspecific EKG changes appears relatively stable compared to prior EKG  Comparison to old EKG: stable as compared to pt's most recent       Labwork ordered and interpretation by myself. Details below. Imaging ordered and interpretations by myself and radiologist. Details below.             Medications given include:    Medications   oseltamivir (TAMIFLU) capsule 75 mg (75 mg Oral Given 1/13/23 0819)   amLODIPine (NORVASC) tablet 10 mg (10 mg Oral Given 1/13/23 0818)   atorvastatin (LIPITOR) tablet 40 mg (40 mg Oral Given 1/13/23 0818)   bumetanide (BUMEX) tablet 1 mg (1 mg Oral Given 1/13/23 0818)   losartan (COZAAR) tablet 100 mg (100 mg Oral Given 1/13/23 0818)   metoprolol succinate (TOPROL XL) extended release tablet 50 mg (50 mg Oral Given 1/13/23 0819)   spironolactone (ALDACTONE) tablet 25 mg (25 mg Oral Given 1/13/23 0819)   traZODone (DESYREL) tablet 50 mg (has no administration in time range)   insulin lispro (HUMALOG) injection vial 0-8 Units (0 Units SubCUTAneous Not Given 1/13/23 0746)   insulin lispro (HUMALOG) injection vial 0-4 Units (has no administration in time range)   glucose chewable tablet 16 g (has no administration in time range)   dextrose bolus 10% 125 mL (has no administration in time range)     Or   dextrose bolus 10% 250 mL (has no administration in time range)   glucagon (rDNA) injection 1 mg (has no administration in time range)   dextrose 10 % infusion (has no administration in time range)   enoxaparin (LOVENOX) injection 40 mg (40 mg SubCUTAneous Given 1/13/23 0820)   acetaminophen (TYLENOL) tablet 500 mg (500 mg Oral Given 1/13/23 0818)   ipratropium-albuterol (DUONEB) nebulizer solution 1 ampule (1 ampule Inhalation Given 1/13/23 0730)   melatonin tablet 3 mg (has no administration in time range)   budesonide (PULMICORT) nebulizer suspension 500 mcg (500 mcg Nebulization Given 1/13/23 0730)     And   arformoterol tartrate (BROVANA) nebulizer solution 15 mcg (15 mcg Nebulization Given 1/13/23 0730)   ipratropium-albuterol (DUONEB) nebulizer solution 3 ampule (3 ampules Inhalation Given 1/12/23 1932)   nitroglycerin (NITRO-BID) 2 % ointment 0.5 inch (0.5 inches Topical Given 1/12/23 1923)   acetaminophen (TYLENOL) tablet 1,000 mg (1,000 mg Oral Given 1/12/23 2049)   ketorolac (TORADOL) injection 15 mg (15 mg IntraVENous Given 1/12/23 2049)   magnesium sulfate 2000 mg in 50 mL IVPB premix (0 mg IntraVENous Stopped 1/13/23 0121)   iopamidol (ISOVUE-370) 76 % injection 75 mL (75 mLs IntraVENous Given 1/12/23 2146)       -------------------- Consultations --------------------    Internal Medicine was consulted for admission.     -------------------- RESULTS --------------------    LABS:  Results for orders placed or performed during the hospital encounter of 01/12/23   RAPID INFLUENZA A/B ANTIGENS    Specimen: Nasopharyngeal   Result Value Ref Range    Influenza A by PCR DETECTED (A) Not Detected    Influenza B by PCR Not Detected Not Detected   COVID-19, Rapid    Specimen: Nasopharyngeal Swab   Result Value Ref Range    SARS-CoV-2, NAAT Not Detected Not Detected   CBC with Auto Differential   Result Value Ref Range    WBC 7.3 4.5 - 11.5 E9/L    RBC 4.45 3.80 - 5.80 E12/L    Hemoglobin 14.2 12.5 - 16.5 g/dL    Hematocrit 41.6 37.0 - 54.0 %    MCV 93.5 80.0 - 99.9 fL    MCH 31.9 26.0 - 35.0 pg    MCHC 34.1 32.0 - 34.5 %    RDW 13.5 11.5 - 15.0 fL    Platelets 101 986 - 251 E9/L    MPV 9.3 7.0 - 12.0 fL    Neutrophils % 81.9 (H) 43.0 - 80.0 %    Immature Granulocytes % 0.8 0.0 - 5.0 %    Lymphocytes % 8.8 (L) 20.0 - 42.0 %    Monocytes % 8.2 2.0 - 12.0 %    Eosinophils % 0.0 0.0 - 6.0 %    Basophils % 0.3 0.0 - 2.0 %    Neutrophils Absolute 5.99 1.80 - 7.30 E9/L    Immature Granulocytes # 0.06 E9/L    Lymphocytes Absolute 0.64 (L) 1.50 - 4.00 E9/L    Monocytes Absolute 0.60 0.10 - 0.95 E9/L    Eosinophils Absolute 0.00 (L) 0.05 - 0.50 E9/L    Basophils Absolute 0.02 0.00 - 0.20 E9/L   Comprehensive Metabolic Panel   Result Value Ref Range    Sodium 134 132 - 146 mmol/L    Potassium 3.5 3.5 - 5.0 mmol/L    Chloride 97 (L) 98 - 107 mmol/L    CO2 22 22 - 29 mmol/L    Anion Gap 15 7 - 16 mmol/L    Glucose 227 (H) 74 - 99 mg/dL    BUN 15 6 - 20 mg/dL    Creatinine 1.2 0.7 - 1.2 mg/dL    Est, Glom Filt Rate >60 >=60 mL/min/1.73    Calcium 9.1 8.6 - 10.2 mg/dL    Total Protein 7.3 6.4 - 8.3 g/dL    Albumin 4.1 3.5 - 5.2 g/dL    Total Bilirubin 0.6 0.0 - 1.2 mg/dL    Alkaline Phosphatase 85 40 - 129 U/L    ALT 36 0 - 40 U/L    AST 59 (H) 0 - 39 U/L   Troponin   Result Value Ref Range    Troponin, High Sensitivity 40 (H) 0 - 11 ng/L   Brain Natriuretic Peptide   Result Value Ref Range    Pro-BNP 1,218 (H) 0 - 125 pg/mL   Magnesium   Result Value Ref Range    Magnesium 1.5 (L) 1.6 - 2.6 mg/dL   Troponin   Result Value Ref Range    Troponin, High Sensitivity 42 (H) 0 - 11 ng/L Comprehensive Metabolic Panel   Result Value Ref Range    Sodium 132 132 - 146 mmol/L    Potassium 3.6 3.5 - 5.0 mmol/L    Chloride 96 (L) 98 - 107 mmol/L    CO2 21 (L) 22 - 29 mmol/L    Anion Gap 15 7 - 16 mmol/L    Glucose 186 (H) 74 - 99 mg/dL    BUN 16 6 - 20 mg/dL    Creatinine 1.1 0.7 - 1.2 mg/dL    Est, Glom Filt Rate >60 >=60 mL/min/1.73    Calcium 8.5 (L) 8.6 - 10.2 mg/dL    Total Protein 7.1 6.4 - 8.3 g/dL    Albumin 3.8 3.5 - 5.2 g/dL    Total Bilirubin 0.5 0.0 - 1.2 mg/dL    Alkaline Phosphatase 78 40 - 129 U/L    ALT 43 (H) 0 - 40 U/L    AST 79 (H) 0 - 39 U/L   POCT Glucose   Result Value Ref Range    Meter Glucose 133 (H) 74 - 99 mg/dL   EKG 12 Lead   Result Value Ref Range    Ventricular Rate 93 BPM    Atrial Rate 93 BPM    P-R Interval 174 ms    QRS Duration 84 ms    Q-T Interval 366 ms    QTc Calculation (Bazett) 455 ms    P Axis 54 degrees    R Axis -23 degrees    T Axis 100 degrees       RADIOLOGY:  CTA PULMONARY W CONTRAST   Final Result   No evidence of pulmonary embolism or acute pulmonary abnormality. RECOMMENDATIONS:   Unavailable         XR CHEST PORTABLE   Final Result   No pneumonia or pleural effusion.             -------------------- NURSING NOTES & VITALS --------------------    The nursing notes within the ED encounter and vital signs have been reviewed. Vitals:    01/13/23 0817   BP: 137/80   Pulse: 93   Resp: 18   Temp:    SpO2: 95%        Patient Vitals for the past 8 hrs:   BP Pulse Resp SpO2   01/13/23 0817 137/80 93 18 95 %   01/13/23 0758 -- -- 26 --   01/13/23 0309 -- -- 20 --   01/13/23 0300 (!) 155/80 86 20 100 %       Oxygen Saturation Interpretation: Normal      -------------------- PROGRESS NOTES --------------------  Counseling:  I have spoken with the patient and discussed todays results, in addition to providing specific details for the plan of care and counseling regarding the diagnosis and prognosis.   Their questions are answered at this time and they are agreeable with the plan of admission.    -------------------- ADDITIONAL PROVIDER NOTES --------------------    Admission Consultation:  Time: 2000. Spoke with Dr. Jacqueline Corrales. Discussed case. They will admit the patient. This patient's ED course included: a personal history and physicial examination, re-evaluation prior to disposition, IV medications, and continuous pulse oximetry    Diagnosis:  1. Acute respiratory failure with hypoxia ([address]    2. Influenza A        Disposition:  Patient's disposition: Admit to med/surg floor  Patient's condition is fair. Stefania Gray MD      *NOTE: This report was transcribed using voice recognition software. Every effort was made to ensure accuracy; however, inadvertent computerized transcription errors may be present.        Stefania Gray MD  Resident  01/13/23 4590

## 2023-01-13 VITALS
BODY MASS INDEX: 49.5 KG/M2 | RESPIRATION RATE: 20 BRPM | HEART RATE: 77 BPM | OXYGEN SATURATION: 93 % | TEMPERATURE: 98.9 F | SYSTOLIC BLOOD PRESSURE: 125 MMHG | DIASTOLIC BLOOD PRESSURE: 74 MMHG | WEIGHT: 315 LBS

## 2023-01-13 LAB
ALBUMIN SERPL-MCNC: 3.8 G/DL (ref 3.5–5.2)
ALP BLD-CCNC: 78 U/L (ref 40–129)
ALT SERPL-CCNC: 43 U/L (ref 0–40)
ANION GAP SERPL CALCULATED.3IONS-SCNC: 15 MMOL/L (ref 7–16)
AST SERPL-CCNC: 79 U/L (ref 0–39)
BILIRUB SERPL-MCNC: 0.5 MG/DL (ref 0–1.2)
BUN BLDV-MCNC: 16 MG/DL (ref 6–20)
CALCIUM SERPL-MCNC: 8.5 MG/DL (ref 8.6–10.2)
CHLORIDE BLD-SCNC: 96 MMOL/L (ref 98–107)
CO2: 21 MMOL/L (ref 22–29)
CREAT SERPL-MCNC: 1.1 MG/DL (ref 0.7–1.2)
EKG ATRIAL RATE: 93 BPM
EKG P AXIS: 54 DEGREES
EKG P-R INTERVAL: 174 MS
EKG Q-T INTERVAL: 366 MS
EKG QRS DURATION: 84 MS
EKG QTC CALCULATION (BAZETT): 455 MS
EKG R AXIS: -23 DEGREES
EKG T AXIS: 100 DEGREES
EKG VENTRICULAR RATE: 93 BPM
GFR SERPL CREATININE-BSD FRML MDRD: >60 ML/MIN/1.73
GLUCOSE BLD-MCNC: 186 MG/DL (ref 74–99)
METER GLUCOSE: 133 MG/DL (ref 74–99)
METER GLUCOSE: 220 MG/DL (ref 74–99)
POTASSIUM SERPL-SCNC: 3.6 MMOL/L (ref 3.5–5)
SODIUM BLD-SCNC: 132 MMOL/L (ref 132–146)
TOTAL PROTEIN: 7.1 G/DL (ref 6.4–8.3)

## 2023-01-13 PROCEDURE — 93010 ELECTROCARDIOGRAM REPORT: CPT | Performed by: INTERNAL MEDICINE

## 2023-01-13 PROCEDURE — 6360000002 HC RX W HCPCS: Performed by: INTERNAL MEDICINE

## 2023-01-13 PROCEDURE — 82962 GLUCOSE BLOOD TEST: CPT

## 2023-01-13 PROCEDURE — 6370000000 HC RX 637 (ALT 250 FOR IP)

## 2023-01-13 PROCEDURE — 80053 COMPREHEN METABOLIC PANEL: CPT

## 2023-01-13 PROCEDURE — 94660 CPAP INITIATION&MGMT: CPT

## 2023-01-13 PROCEDURE — 94640 AIRWAY INHALATION TREATMENT: CPT

## 2023-01-13 PROCEDURE — 6370000000 HC RX 637 (ALT 250 FOR IP): Performed by: INTERNAL MEDICINE

## 2023-01-13 RX ORDER — INSULIN DETEMIR 100 [IU]/ML
258 INJECTION, SOLUTION SUBCUTANEOUS EVERY MORNING
COMMUNITY

## 2023-01-13 RX ORDER — INSULIN LISPRO 100 [IU]/ML
0-4 INJECTION, SOLUTION INTRAVENOUS; SUBCUTANEOUS NIGHTLY
Status: DISCONTINUED | OUTPATIENT
Start: 2023-01-13 | End: 2023-01-13 | Stop reason: HOSPADM

## 2023-01-13 RX ORDER — OSELTAMIVIR PHOSPHATE 75 MG/1
75 CAPSULE ORAL 2 TIMES DAILY
Qty: 9 CAPSULE | Refills: 0 | Status: SHIPPED | OUTPATIENT
Start: 2023-01-13 | End: 2023-01-18

## 2023-01-13 RX ORDER — ATORVASTATIN CALCIUM 40 MG/1
40 TABLET, FILM COATED ORAL DAILY
Status: DISCONTINUED | OUTPATIENT
Start: 2023-01-13 | End: 2023-01-13 | Stop reason: HOSPADM

## 2023-01-13 RX ORDER — TRAZODONE HYDROCHLORIDE 50 MG/1
50 TABLET ORAL NIGHTLY
Status: DISCONTINUED | OUTPATIENT
Start: 2023-01-13 | End: 2023-01-13 | Stop reason: HOSPADM

## 2023-01-13 RX ORDER — CHOLECALCIFEROL (VITAMIN D3) 125 MCG
10 CAPSULE ORAL NIGHTLY
COMMUNITY

## 2023-01-13 RX ORDER — SPIRONOLACTONE 25 MG/1
25 TABLET ORAL DAILY
Status: DISCONTINUED | OUTPATIENT
Start: 2023-01-13 | End: 2023-01-13 | Stop reason: HOSPADM

## 2023-01-13 RX ORDER — METOPROLOL SUCCINATE 50 MG/1
50 TABLET, EXTENDED RELEASE ORAL 2 TIMES DAILY
Status: DISCONTINUED | OUTPATIENT
Start: 2023-01-13 | End: 2023-01-13 | Stop reason: HOSPADM

## 2023-01-13 RX ORDER — DEXTROSE MONOHYDRATE 100 MG/ML
INJECTION, SOLUTION INTRAVENOUS CONTINUOUS PRN
Status: DISCONTINUED | OUTPATIENT
Start: 2023-01-13 | End: 2023-01-13 | Stop reason: HOSPADM

## 2023-01-13 RX ORDER — LOSARTAN POTASSIUM 50 MG/1
100 TABLET ORAL DAILY
Status: DISCONTINUED | OUTPATIENT
Start: 2023-01-13 | End: 2023-01-13 | Stop reason: HOSPADM

## 2023-01-13 RX ORDER — ACETAMINOPHEN 500 MG
500 TABLET ORAL EVERY 4 HOURS PRN
Status: DISCONTINUED | OUTPATIENT
Start: 2023-01-13 | End: 2023-01-13 | Stop reason: HOSPADM

## 2023-01-13 RX ORDER — LANOLIN ALCOHOL/MO/W.PET/CERES
3 CREAM (GRAM) TOPICAL NIGHTLY PRN
Status: DISCONTINUED | OUTPATIENT
Start: 2023-01-13 | End: 2023-01-13 | Stop reason: HOSPADM

## 2023-01-13 RX ORDER — METOPROLOL SUCCINATE 50 MG/1
50 TABLET, EXTENDED RELEASE ORAL 2 TIMES DAILY
Qty: 30 TABLET | Refills: 3 | Status: SHIPPED | OUTPATIENT
Start: 2023-01-13

## 2023-01-13 RX ORDER — ENOXAPARIN SODIUM 100 MG/ML
40 INJECTION SUBCUTANEOUS 2 TIMES DAILY
Status: DISCONTINUED | OUTPATIENT
Start: 2023-01-13 | End: 2023-01-13 | Stop reason: HOSPADM

## 2023-01-13 RX ORDER — BUDESONIDE 0.5 MG/2ML
0.5 INHALANT ORAL 2 TIMES DAILY
Status: DISCONTINUED | OUTPATIENT
Start: 2023-01-13 | End: 2023-01-13 | Stop reason: HOSPADM

## 2023-01-13 RX ORDER — AMLODIPINE BESYLATE 10 MG/1
10 TABLET ORAL DAILY
Status: DISCONTINUED | OUTPATIENT
Start: 2023-01-13 | End: 2023-01-13 | Stop reason: HOSPADM

## 2023-01-13 RX ORDER — OSELTAMIVIR PHOSPHATE 75 MG/1
75 CAPSULE ORAL 2 TIMES DAILY
Status: DISCONTINUED | OUTPATIENT
Start: 2023-01-13 | End: 2023-01-13 | Stop reason: SDUPTHER

## 2023-01-13 RX ORDER — BUMETANIDE 1 MG/1
1 TABLET ORAL DAILY
Status: DISCONTINUED | OUTPATIENT
Start: 2023-01-13 | End: 2023-01-13 | Stop reason: HOSPADM

## 2023-01-13 RX ORDER — ATORVASTATIN CALCIUM 80 MG/1
40 TABLET, FILM COATED ORAL DAILY
Qty: 30 TABLET | Refills: 1 | Status: SHIPPED
Start: 2023-01-13

## 2023-01-13 RX ORDER — FUROSEMIDE 40 MG/1
40 TABLET ORAL EVERY EVENING
COMMUNITY

## 2023-01-13 RX ORDER — IPRATROPIUM BROMIDE AND ALBUTEROL SULFATE 2.5; .5 MG/3ML; MG/3ML
1 SOLUTION RESPIRATORY (INHALATION) 4 TIMES DAILY
Status: DISCONTINUED | OUTPATIENT
Start: 2023-01-13 | End: 2023-01-13 | Stop reason: HOSPADM

## 2023-01-13 RX ORDER — INSULIN LISPRO 100 [IU]/ML
0-8 INJECTION, SOLUTION INTRAVENOUS; SUBCUTANEOUS
Status: DISCONTINUED | OUTPATIENT
Start: 2023-01-13 | End: 2023-01-13 | Stop reason: HOSPADM

## 2023-01-13 RX ORDER — ARFORMOTEROL TARTRATE 15 UG/2ML
15 SOLUTION RESPIRATORY (INHALATION) 2 TIMES DAILY
Status: DISCONTINUED | OUTPATIENT
Start: 2023-01-13 | End: 2023-01-13 | Stop reason: HOSPADM

## 2023-01-13 RX ORDER — ASPIRIN 81 MG/1
162 TABLET ORAL DAILY
COMMUNITY

## 2023-01-13 RX ADMIN — IPRATROPIUM BROMIDE AND ALBUTEROL SULFATE 1 AMPULE: .5; 2.5 SOLUTION RESPIRATORY (INHALATION) at 07:30

## 2023-01-13 RX ADMIN — ENOXAPARIN SODIUM 40 MG: 100 INJECTION SUBCUTANEOUS at 08:20

## 2023-01-13 RX ADMIN — AMLODIPINE BESYLATE 10 MG: 10 TABLET ORAL at 08:18

## 2023-01-13 RX ADMIN — METOPROLOL SUCCINATE 50 MG: 50 TABLET, EXTENDED RELEASE ORAL at 08:19

## 2023-01-13 RX ADMIN — ATORVASTATIN CALCIUM 40 MG: 40 TABLET, FILM COATED ORAL at 08:18

## 2023-01-13 RX ADMIN — BUDESONIDE 500 MCG: 0.5 SUSPENSION RESPIRATORY (INHALATION) at 07:30

## 2023-01-13 RX ADMIN — LOSARTAN POTASSIUM 100 MG: 50 TABLET, FILM COATED ORAL at 08:18

## 2023-01-13 RX ADMIN — INSULIN LISPRO 2 UNITS: 100 INJECTION, SOLUTION INTRAVENOUS; SUBCUTANEOUS at 12:39

## 2023-01-13 RX ADMIN — ARFORMOTEROL TARTRATE 15 MCG: 15 SOLUTION RESPIRATORY (INHALATION) at 07:30

## 2023-01-13 RX ADMIN — ACETAMINOPHEN 500 MG: 500 TABLET, FILM COATED ORAL at 08:18

## 2023-01-13 RX ADMIN — SPIRONOLACTONE 25 MG: 25 TABLET ORAL at 08:19

## 2023-01-13 RX ADMIN — BUMETANIDE 1 MG: 1 TABLET ORAL at 08:18

## 2023-01-13 RX ADMIN — IPRATROPIUM BROMIDE AND ALBUTEROL SULFATE 1 AMPULE: .5; 2.5 SOLUTION RESPIRATORY (INHALATION) at 12:37

## 2023-01-13 RX ADMIN — OSELTAMIVIR PHOSPHATE 75 MG: 75 CAPSULE ORAL at 08:19

## 2023-01-13 NOTE — ED NOTES
Morning meds and breakfast tray given. Pt placed on 4L O2 via NC for while he is eating.       David Oropeza RN  01/13/23 0630

## 2023-01-13 NOTE — ED NOTES
Patient stated they want to leave AMA and not be admitted to hospital      [facility] JONATAN MEREDITH, RN  01/13/23 3817

## 2023-01-13 NOTE — H&P
Chris Barba MD FACP  Internal medicine   History and Physical      CHIEF COMPLAINT: Shortness of breath      HISTORY OF PRESENT ILLNESS:    1/13/2023  Patient was seen in the emergency room extension at the main campus of [facility] earlier this morning  Respiratory therapist at bedside  Data reviewed in detail  Spoke with the ER physician at the time of admission  45-year-old morbidly obese diabetic with known history of coronary artery disease and prior TIA  Uses CPAP at home for obstructive sleep apnea  Presents with 1 day history of generalized fatigue cough and shortness of breath  Cough resulting in chest wall pain  He tested positive for influenza  Denied fever and chills or any contact with sick individuals  Troponin is marginally elevated  Being admitted for further management of hypoxia    Past Medical History:    Past Medical History:   Diagnosis Date    Accelerated hypertension 5/5/2017    Arthritis     CAD (coronary artery disease)     Cerebral artery occlusion with cerebral infarction ([facility]     TIA    CHF (congestive heart failure) (H[facility])     COPD (chronic obstructive pulmonary disease) ([facility] [address]     Depression with anxiety     Fibromyalgia     GI bleeding     Hyperlipidemia     Hypertension     Myocardial infarct ([facility] [address]     in past, per testing, date unknown    Sleep apnea     cpap    Urinary frequency        Past Surgical History:    Past Surgical History:   Procedure Laterality Date    CARDIAC CATHETERIZATION  03/16/2017    Dr Giovana William  04/12/2018    Dr. Peace Valentin  08/18/2017    CORONARY ANGIOPLASTY WITH STENT PLACEMENT  09/13/2019    rca distal 4.5x 30    CORONARY ANGIOPLASTY WITH STENT PLACEMENT  10/27/2021    [facility] [place]  3.0 x 22 proximal LAD by DR Escamilla Her    ENDOSCOPY, COLON, DIAGNOSTIC  08/18/2017    [address] INCL FLUOR GDNCE DX W/CELL WASHG SPX N/A 10/22/2018    DIRECT LARYNGOSCOPY performed by Isabel Leon DO at [facility]  LARYNGOSCOPY W/BIOPSY MICROSCOPE/TELESCOPE N/A 11/1/2018    MICRO LARYNGOSCOPY WITH BIOPSY TRUE VOCAL CORD LESION performed by Brooklyn Acevedo DO at [facility]         Medications Prior to Admission:    Not in a hospital admission. Allergies:    Pcn [penicillins]    Social History:    reports that he has been smoking cigarettes. He has a 16.50 pack-year smoking history. He has never used smokeless tobacco. He reports current alcohol use of about 6.0 standard drinks per week. He reports current drug use. Drug: Marijuana [place]). Family History:   family history includes Heart Disease in his mother; No Known Problems in his brother, brother, sister, and sister; Other in his father. REVIEW OF SYSTEMS:  As above in the HPI, otherwise negative    PHYSICAL EXAM:    Vitals:  /80   Pulse 93   Temp 98.9 °F (37.2 °C) (Temporal)   Resp 18   Wt (!) 365 lb (165.6 kg)   SpO2 95%   BMI 49.50 kg/m²     General:  Awake, alert, oriented X 3. Morbidly obese  In moderate respiratory distress  HEENT:  Normocephalic, atraumatic. Pupils equal, round, reactive to light. No scleral icterus. No conjunctival injection. No nasal discharge. Neck:  Supple  Heart:  RRR, no murmurs, gallops, rubs  Lungs: Grossly diminished breath sounds without wheezes  Abdomen:   Bowel sounds present, soft, nontender, no masses, no organomegaly, no peritoneal signs  Extremities:  No clubbing, cyanosis,   Bilateral lower extremity edema is chronic and stable  Skin:  Warm and dry, no open lesions or rash  Neuro:  Cranial nerves 2-12 intact, no focal deficits  Breast: deferred  Rectal: deferred  Genitalia:  deferred    LABS:        ASSESSMENT:      Principal Problem:    Acute respiratory failure ([address]  Acute influenza tracheal bronchitis  Known history of coronary artery disease with slightly elevated troponin  Obstructive sleep apnea  Morbid obesity  Type 2 diabetes mellitus  Essential hypertension      PLAN:  Admit  Oxygen supplements/wean as tolerated  Inhaled bronchodilators  DVT prophylaxis with Lovenox  Patient is intolerant of steroids due to diabetes mellitus  Resume home medications  Cardiology consult  Questions answered to his satisfaction    Darion Almaguer MD  10:20 AM  1/13/2023

## 2023-01-13 NOTE — PROGRESS NOTES
01/13/23 0758   NIV Type   $NIV $Daily Charge   Skin Assessment Clean, dry, & intact   Skin Protection for O2 Device Yes   Orientation Middle   Location Nose   NIV Started/Stopped On   Equipment Type V60   Mode Bilevel   Mask Type Full face mask  (NON-VENTED)   Mask Size Large   Settings/Measurements   PIP Observed 13 cm H20   IPAP 12 cmH20   CPAP/EPAP 8 cmH2O   Vt (Measured) 820 mL   Rate Ordered 12   Resp 26   Insp Rise Time (%) 2 %   FiO2  35 %   I Time/ I Time % 0.9 s   Minute Volume (L/min) 19.7 Liters   Mask Leak (lpm) 39 lpm   Comfort Level Good   Using Accessory Muscles No   SpO2 99   Date: 1/13/2023    Time: 8:01 AM    Patient Placed On BIPAP/CPAP/ Non-Invasive Ventilation? Yes    If no must comment. Facial area red/color change? No           If YES are Blister/Lesion present? No   If yes must notify nursing staff  BIPAP/CPAP skin barrier?   Yes    Skin barrier type:mepilexlite       Comments:        Binta Perkins RCP

## 2023-01-13 NOTE — ED NOTES
Physician notified of patients desire to leave AMA. Admitting approved and placed discharge orders.       Parisa Zambrano RN  01/13/23 3460

## 2023-02-20 ENCOUNTER — HOSPITAL ENCOUNTER (EMERGENCY)
Age: 58
Discharge: LEFT BEFORE DISCHARGE | End: 2023-02-20
Payer: MEDICARE

## 2023-02-20 VITALS
HEART RATE: 82 BPM | SYSTOLIC BLOOD PRESSURE: 172 MMHG | RESPIRATION RATE: 22 BRPM | OXYGEN SATURATION: 96 % | TEMPERATURE: 97.4 F | DIASTOLIC BLOOD PRESSURE: 105 MMHG

## 2023-02-20 PROCEDURE — 99285 EMERGENCY DEPT VISIT HI MDM: CPT

## 2023-02-20 PROCEDURE — 99281 EMR DPT VST MAYX REQ PHY/QHP: CPT

## 2023-02-20 ASSESSMENT — PAIN - FUNCTIONAL ASSESSMENT
PAIN_FUNCTIONAL_ASSESSMENT: 0-10
PAIN_FUNCTIONAL_ASSESSMENT: 0-10

## 2023-02-20 ASSESSMENT — PAIN DESCRIPTION - PAIN TYPE: TYPE: ACUTE PAIN

## 2023-02-20 ASSESSMENT — PAIN DESCRIPTION - LOCATION: LOCATION: BACK;LEG

## 2023-02-20 ASSESSMENT — PAIN DESCRIPTION - DESCRIPTORS: DESCRIPTORS: ACHING;SHARP

## 2023-02-20 ASSESSMENT — PAIN DESCRIPTION - ORIENTATION: ORIENTATION: LEFT;RIGHT

## 2023-02-20 ASSESSMENT — PAIN SCALES - GENERAL
PAINLEVEL_OUTOF10: 8
PAINLEVEL_OUTOF10: 10

## 2023-02-20 NOTE — ED NOTES
Department of Emergency Medicine  FIRST PROVIDER TRIAGE NOTE             Independent MLP           2/20/23  3:37 PM EST    Date of Encounter: 2/20/23   MRN: [id number]      HPI: Kim Ness is a 66 y.o. male who presents to the ED for Back Pain (Lower back pain since Friday after bending to put sock on. Had fall 2 weeks ago. Denies loss of bowel or bladder. Bilateral upper leg pain that began Saturday. Denies injury. States he now has tightness in chest and bilateral arms.) and Rectal Bleeding (Bright red blood with bowel movement. States has been taking a lot of ibuprofen for pain. Denies thinners. )     Pt presenting with low back pain, left kidney pain, chest tightness, rectal bleeding     ROS: Negative for fever or cough. PE: Gen Appearance/Constitutional: alert  HEENT: NC/NT. PERRLA,  Airway patent. Initial Plan of Care: All treatment areas with department are currently occupied. Plan to order/Initiate the following while awaiting opening in ED: labs, EKG, and imaging studies.   Initiate Treatment-Testing, Proceed toTreatment Area When Bed Available for ED Attending/MLP to Continue Care    Electronically signed by Matt Hu PA-C   DD: 2/20/23      Matt Hu PA-C  02/20/23 [id number]

## 2023-02-20 NOTE — ED NOTES
Called patient for protocol, advised patient was outside. Will try again.       Dmitry [place], [place]  04/88/52 6056

## 2023-02-21 ENCOUNTER — APPOINTMENT (OUTPATIENT)
Dept: GENERAL RADIOLOGY | Age: 58
End: 2023-02-21
Payer: MEDICARE

## 2023-02-21 ENCOUNTER — HOSPITAL ENCOUNTER (EMERGENCY)
Age: 58
Discharge: LEFT BEFORE DISCHARGE | End: 2023-02-21
Payer: MEDICARE

## 2023-02-21 VITALS
OXYGEN SATURATION: 97 % | WEIGHT: 315 LBS | RESPIRATION RATE: 18 BRPM | HEART RATE: 81 BPM | SYSTOLIC BLOOD PRESSURE: 181 MMHG | BODY MASS INDEX: 49.5 KG/M2 | DIASTOLIC BLOOD PRESSURE: 115 MMHG | TEMPERATURE: 97.5 F

## 2023-02-21 LAB
ALBUMIN SERPL-MCNC: 3.9 G/DL (ref 3.5–5.2)
ALP BLD-CCNC: 91 U/L (ref 40–129)
ALT SERPL-CCNC: 23 U/L (ref 0–40)
ANION GAP SERPL CALCULATED.3IONS-SCNC: 9 MMOL/L (ref 7–16)
AST SERPL-CCNC: 20 U/L (ref 0–39)
BASOPHILS ABSOLUTE: 0.04 E9/L (ref 0–0.2)
BASOPHILS RELATIVE PERCENT: 0.6 % (ref 0–2)
BILIRUB SERPL-MCNC: 0.2 MG/DL (ref 0–1.2)
BILIRUBIN URINE: NEGATIVE
BLOOD, URINE: NEGATIVE
BUN BLDV-MCNC: 18 MG/DL (ref 6–20)
CALCIUM SERPL-MCNC: 9 MG/DL (ref 8.6–10.2)
CHLORIDE BLD-SCNC: 102 MMOL/L (ref 98–107)
CLARITY: CLEAR
CO2: 26 MMOL/L (ref 22–29)
COLOR: YELLOW
CREAT SERPL-MCNC: 1.1 MG/DL (ref 0.7–1.2)
EKG ATRIAL RATE: 74 BPM
EKG P AXIS: 25 DEGREES
EKG P-R INTERVAL: 168 MS
EKG Q-T INTERVAL: 398 MS
EKG QRS DURATION: 82 MS
EKG QTC CALCULATION (BAZETT): 441 MS
EKG R AXIS: -23 DEGREES
EKG T AXIS: 151 DEGREES
EKG VENTRICULAR RATE: 74 BPM
EOSINOPHILS ABSOLUTE: 0.11 E9/L (ref 0.05–0.5)
EOSINOPHILS RELATIVE PERCENT: 1.7 % (ref 0–6)
GFR SERPL CREATININE-BSD FRML MDRD: >60 ML/MIN/1.73
GLUCOSE BLD-MCNC: 319 MG/DL (ref 74–99)
GLUCOSE URINE: >=1000 MG/DL
HCT VFR BLD CALC: 41 % (ref 37–54)
HEMOGLOBIN: 14.2 G/DL (ref 12.5–16.5)
IMMATURE GRANULOCYTES #: 0.03 E9/L
IMMATURE GRANULOCYTES %: 0.5 % (ref 0–5)
KETONES, URINE: NEGATIVE MG/DL
LACTIC ACID: 1.7 MMOL/L (ref 0.5–2.2)
LEUKOCYTE ESTERASE, URINE: NEGATIVE
LIPASE: 54 U/L (ref 13–60)
LYMPHOCYTES ABSOLUTE: 1.68 E9/L (ref 1.5–4)
LYMPHOCYTES RELATIVE PERCENT: 26 % (ref 20–42)
MCH RBC QN AUTO: 31.6 PG (ref 26–35)
MCHC RBC AUTO-ENTMCNC: 34.6 % (ref 32–34.5)
MCV RBC AUTO: 91.1 FL (ref 80–99.9)
MONOCYTES ABSOLUTE: 0.56 E9/L (ref 0.1–0.95)
MONOCYTES RELATIVE PERCENT: 8.7 % (ref 2–12)
NEUTROPHILS ABSOLUTE: 4.05 E9/L (ref 1.8–7.3)
NEUTROPHILS RELATIVE PERCENT: 62.5 % (ref 43–80)
NITRITE, URINE: NEGATIVE
PDW BLD-RTO: 14.2 FL (ref 11.5–15)
PH UA: 7 (ref 5–9)
PLATELET # BLD: 160 E9/L (ref 130–450)
PMV BLD AUTO: 9.1 FL (ref 7–12)
POTASSIUM SERPL-SCNC: 4.4 MMOL/L (ref 3.5–5)
PRO-BNP: 598 PG/ML (ref 0–125)
PROTEIN UA: NEGATIVE MG/DL
RBC # BLD: 4.5 E12/L (ref 3.8–5.8)
SODIUM BLD-SCNC: 137 MMOL/L (ref 132–146)
SPECIFIC GRAVITY UA: 1.01 (ref 1–1.03)
TOTAL CK: 208 U/L (ref 20–200)
TOTAL PROTEIN: 6.8 G/DL (ref 6.4–8.3)
TROPONIN, HIGH SENSITIVITY: 31 NG/L (ref 0–11)
UROBILINOGEN, URINE: 0.2 E.U./DL
WBC # BLD: 6.5 E9/L (ref 4.5–11.5)

## 2023-02-21 PROCEDURE — 80053 COMPREHEN METABOLIC PANEL: CPT

## 2023-02-21 PROCEDURE — 83880 ASSAY OF NATRIURETIC PEPTIDE: CPT

## 2023-02-21 PROCEDURE — 85025 COMPLETE CBC W/AUTO DIFF WBC: CPT

## 2023-02-21 PROCEDURE — 93005 ELECTROCARDIOGRAM TRACING: CPT | Performed by: NURSE PRACTITIONER

## 2023-02-21 PROCEDURE — 93010 ELECTROCARDIOGRAM REPORT: CPT | Performed by: INTERNAL MEDICINE

## 2023-02-21 PROCEDURE — 84484 ASSAY OF TROPONIN QUANT: CPT

## 2023-02-21 PROCEDURE — 83605 ASSAY OF LACTIC ACID: CPT

## 2023-02-21 PROCEDURE — 83690 ASSAY OF LIPASE: CPT

## 2023-02-21 PROCEDURE — 81003 URINALYSIS AUTO W/O SCOPE: CPT

## 2023-02-21 PROCEDURE — 82550 ASSAY OF CK (CPK): CPT

## 2023-02-21 PROCEDURE — 71045 X-RAY EXAM CHEST 1 VIEW: CPT

## 2023-02-21 ASSESSMENT — LIFESTYLE VARIABLES
HOW OFTEN DO YOU HAVE A DRINK CONTAINING ALCOHOL: NEVER
HOW MANY STANDARD DRINKS CONTAINING ALCOHOL DO YOU HAVE ON A TYPICAL DAY: PATIENT DOES NOT DRINK

## 2023-02-21 NOTE — ED NOTES
Department of Emergency Medicine  FIRST PROVIDER ELOPEMENT NOTE                 Independent MLP          2/21/23  10:33 AM EST    MRN: [id number]    HPI: Jessica Bhatia is a 66 y.o. male who presents to the ED with the following complaint: Leg Pain (Here earlier, leg pain still there) and Rectal Bleeding (Episode of bright red blood in stool 2/20 @1000 and again at 1700)      (Please refer to [address] [place] [address] for pertinent ROS and PE documentation)  Labs:  Results for orders placed or performed during the hospital encounter of 02/21/23   Troponin   Result Value Ref Range    Troponin, High Sensitivity 31 (H) 0 - 11 ng/L   CBC with Auto Differential   Result Value Ref Range    WBC 6.5 4.5 - 11.5 E9/L    RBC 4.50 3.80 - 5.80 E12/L    Hemoglobin 14.2 12.5 - 16.5 g/dL    Hematocrit 41.0 37.0 - 54.0 %    MCV 91.1 80.0 - 99.9 fL    MCH 31.6 26.0 - 35.0 pg    MCHC 34.6 (H) 32.0 - 34.5 %    RDW 14.2 11.5 - 15.0 fL    Platelets 606 678 - 001 E9/L    MPV 9.1 7.0 - 12.0 fL    Neutrophils % 62.5 43.0 - 80.0 %    Immature Granulocytes % 0.5 0.0 - 5.0 %    Lymphocytes % 26.0 20.0 - 42.0 %    Monocytes % 8.7 2.0 - 12.0 %    Eosinophils % 1.7 0.0 - 6.0 %    Basophils % 0.6 0.0 - 2.0 %    Neutrophils Absolute 4.05 1.80 - 7.30 E9/L    Immature Granulocytes # 0.03 E9/L    Lymphocytes Absolute 1.68 1.50 - 4.00 E9/L    Monocytes Absolute 0.56 0.10 - 0.95 E9/L    Eosinophils Absolute 0.11 0.05 - 0.50 E9/L    Basophils Absolute 0.04 0.00 - 0.20 E9/L   Comprehensive Metabolic Panel   Result Value Ref Range    Sodium 137 132 - 146 mmol/L    Potassium 4.4 3.5 - 5.0 mmol/L    Chloride 102 98 - 107 mmol/L    CO2 26 22 - 29 mmol/L    Anion Gap 9 7 - 16 mmol/L    Glucose 319 (H) 74 - 99 mg/dL    BUN 18 6 - 20 mg/dL    Creatinine 1.1 0.7 - 1.2 mg/dL    Est, Glom Filt Rate >60 >=60 mL/min/1.73    Calcium 9.0 8.6 - 10.2 mg/dL    Total Protein 6.8 6.4 - 8.3 g/dL    Albumin 3.9 3.5 - 5.2 g/dL    Total Bilirubin 0.2 0.0 - 1.2 mg/dL Alkaline Phosphatase 91 40 - 129 U/L    ALT 23 0 - 40 U/L    AST 20 0 - 39 U/L   Lactic Acid   Result Value Ref Range    Lactic Acid 1.7 0.5 - 2.2 mmol/L   Lipase   Result Value Ref Range    Lipase 54 13 - 60 U/L   Urinalysis   Result Value Ref Range    Color, UA Yellow Straw/Yellow    Clarity, UA Clear Clear    Glucose, Ur >=1000 (A) Negative mg/dL    Bilirubin Urine Negative Negative    Ketones, Urine Negative Negative mg/dL    Specific Gravity, UA 1.010 1.005 - 1.030    Blood, Urine Negative Negative    pH, UA 7.0 5.0 - 9.0    Protein, UA Negative Negative mg/dL    Urobilinogen, Urine 0.2 <2.0 E.U./dL    Nitrite, Urine Negative Negative    Leukocyte Esterase, Urine Negative Negative   CK   Result Value Ref Range    Total  (H) 20 - 200 U/L   Brain Natriuretic Peptide   Result Value Ref Range    Pro- (H) 0 - 125 pg/mL   EKG 12 Lead   Result Value Ref Range    Ventricular Rate 74 BPM    Atrial Rate 74 BPM    P-R Interval 168 ms    QRS Duration 82 ms    Q-T Interval 398 ms    QTc Calculation (Bazett) 441 ms    P Axis 25 degrees    R Axis -23 degrees    T Axis 151 degrees     Imaging: All Radiology results interpreted by Radiologist unless otherwise noted. XR CHEST PORTABLE   Final Result   No acute process. ED Course    Medications - No data to display     -------------------------  Disposition    Patient ELOPED from the department prior to completion of evaluation after triage. Results of triage orders that were completed at time of elopement as indicated above were reviewed.     Diagnosis at Time of Elopement: (Based on presenting complaint/available test results):   Rectal bleed    Electronically signed by Deion Vidal PA-C   DD: 2/21/23       Deion Vidal PA-C  02/21/23 1034

## 2023-02-21 NOTE — ED NOTES
Department of Emergency Medicine  FIRST PROVIDER TRIAGE NOTE             Independent MLP           2/21/23  12:03 AM EST    Date of Encounter: 2/21/23   MRN: [id number]      HPI: Belkis Marin is a 66 y.o. male who presents to the ED for Leg Pain (Here earlier, leg pain still there) and Rectal Bleeding (Episode of bright red blood in stool 2/20 @1000 and again at 1700)       ROS: Negative for cp or sob. PE: HEENT: NC/NT. PERRLA,  Airway patent. Initial Plan of Care: All treatment areas with department are currently occupied. Plan to order/Initiate the following while awaiting opening in ED: labs, EKG, and imaging studies.   Initiate Treatment-Testing, Proceed toTreatment Area When Bed Available for ED Attending/MLP to Continue Care    Electronically signed by YUMIKO Yao CNP   DD: 2/21/23       YUMIKO Yao CNP  02/21/23 0003  ATTENDING PROVIDER ATTESTATION:     Supervising Physician, on-site, available for consultation, non-participatory in the evaluation or care of this patient       Alin Campos MD  02/21/23 5688

## 2023-08-06 ENCOUNTER — APPOINTMENT (OUTPATIENT)
Dept: GENERAL RADIOLOGY | Age: 58
End: 2023-08-06
Payer: COMMERCIAL

## 2023-08-06 ENCOUNTER — HOSPITAL ENCOUNTER (EMERGENCY)
Age: 58
Discharge: HOME | End: 2023-08-07
Attending: EMERGENCY MEDICINE
Payer: COMMERCIAL

## 2023-08-06 DIAGNOSIS — R73.9 HYPERGLYCEMIA: Primary | ICD-10-CM

## 2023-08-06 LAB — METER GLUCOSE: >500 MG/DL (ref 74–99)

## 2023-08-06 PROCEDURE — 82800 BLOOD PH: CPT

## 2023-08-06 PROCEDURE — 83880 ASSAY OF NATRIURETIC PEPTIDE: CPT

## 2023-08-06 PROCEDURE — 96374 THER/PROPH/DIAG INJ IV PUSH: CPT

## 2023-08-06 PROCEDURE — 93005 ELECTROCARDIOGRAM TRACING: CPT

## 2023-08-06 PROCEDURE — 82947 ASSAY GLUCOSE BLOOD QUANT: CPT

## 2023-08-06 PROCEDURE — 80053 COMPREHEN METABOLIC PANEL: CPT

## 2023-08-06 PROCEDURE — 81001 URINALYSIS AUTO W/SCOPE: CPT

## 2023-08-06 PROCEDURE — 82010 KETONE BODYS QUAN: CPT

## 2023-08-06 PROCEDURE — 99285 EMERGENCY DEPT VISIT HI MDM: CPT

## 2023-08-06 PROCEDURE — 83605 ASSAY OF LACTIC ACID: CPT

## 2023-08-06 PROCEDURE — 85025 COMPLETE CBC W/AUTO DIFF WBC: CPT

## 2023-08-06 PROCEDURE — 84484 ASSAY OF TROPONIN QUANT: CPT

## 2023-08-06 PROCEDURE — 2580000003 HC RX 258: Performed by: EMERGENCY MEDICINE

## 2023-08-06 PROCEDURE — 2580000003 HC RX 258

## 2023-08-06 RX ORDER — SODIUM CHLORIDE 9 MG/ML
INJECTION, SOLUTION INTRAVENOUS CONTINUOUS
Status: DISCONTINUED | OUTPATIENT
Start: 2023-08-06 | End: 2023-08-07 | Stop reason: HOSPADM

## 2023-08-06 RX ORDER — 0.9 % SODIUM CHLORIDE 0.9 %
1000 INTRAVENOUS SOLUTION INTRAVENOUS ONCE
Status: COMPLETED | OUTPATIENT
Start: 2023-08-06 | End: 2023-08-07

## 2023-08-06 RX ADMIN — SODIUM CHLORIDE 1000 ML: 9 INJECTION, SOLUTION INTRAVENOUS at 23:38

## 2023-08-06 RX ADMIN — SODIUM CHLORIDE: 9 INJECTION, SOLUTION INTRAVENOUS at 23:39

## 2023-08-06 ASSESSMENT — PAIN - FUNCTIONAL ASSESSMENT: PAIN_FUNCTIONAL_ASSESSMENT: NONE - DENIES PAIN

## 2023-08-07 ENCOUNTER — APPOINTMENT (OUTPATIENT)
Dept: GENERAL RADIOLOGY | Age: 58
End: 2023-08-07
Payer: COMMERCIAL

## 2023-08-07 VITALS
RESPIRATION RATE: 20 BRPM | TEMPERATURE: 97.8 F | OXYGEN SATURATION: 95 % | BODY MASS INDEX: 42.66 KG/M2 | SYSTOLIC BLOOD PRESSURE: 142 MMHG | DIASTOLIC BLOOD PRESSURE: 80 MMHG | HEART RATE: 61 BPM | WEIGHT: 315 LBS | HEIGHT: 72 IN

## 2023-08-07 LAB
ALBUMIN SERPL-MCNC: 4 G/DL (ref 3.5–5.2)
ALP SERPL-CCNC: 147 U/L (ref 40–129)
ALT SERPL-CCNC: 25 U/L (ref 0–40)
ANION GAP SERPL CALCULATED.3IONS-SCNC: 16 MMOL/L (ref 7–16)
AST SERPL-CCNC: 24 U/L (ref 0–39)
B-OH-BUTYR SERPL-MCNC: 0.17 MMOL/L (ref 0.02–0.27)
BASOPHILS # BLD: 0.03 K/UL (ref 0–0.2)
BASOPHILS NFR BLD: 0 % (ref 0–2)
BILIRUB SERPL-MCNC: 0.3 MG/DL (ref 0–1.2)
BILIRUB UR QL STRIP: NEGATIVE
BNP SERPL-MCNC: 500 PG/ML (ref 0–125)
BUN SERPL-MCNC: 25 MG/DL (ref 6–20)
CALCIUM SERPL-MCNC: 9.3 MG/DL (ref 8.6–10.2)
CHLORIDE SERPL-SCNC: 95 MMOL/L (ref 98–107)
CHP ED QC CHECK: NORMAL
CLARITY UR: CLEAR
CO2 SERPL-SCNC: 18 MMOL/L (ref 22–29)
COLOR UR: YELLOW
CREAT SERPL-MCNC: 1 MG/DL (ref 0.7–1.2)
EKG ATRIAL RATE: 71 BPM
EKG P AXIS: 36 DEGREES
EKG P-R INTERVAL: 168 MS
EKG Q-T INTERVAL: 388 MS
EKG QRS DURATION: 80 MS
EKG QTC CALCULATION (BAZETT): 421 MS
EKG R AXIS: -22 DEGREES
EKG T AXIS: -43 DEGREES
EKG VENTRICULAR RATE: 71 BPM
EOSINOPHIL # BLD: 0.09 K/UL (ref 0.05–0.5)
EOSINOPHILS RELATIVE PERCENT: 1 % (ref 0–6)
ERYTHROCYTE [DISTWIDTH] IN BLOOD BY AUTOMATED COUNT: 13.1 % (ref 11.5–15)
GFR SERPL CREATININE-BSD FRML MDRD: >60 ML/MIN/1.73M2
GLUCOSE BLD-MCNC: 386 MG/DL
GLUCOSE SERPL-MCNC: 611 MG/DL (ref 74–99)
GLUCOSE UR STRIP-MCNC: >=1000 MG/DL
HCT VFR BLD AUTO: 44.6 % (ref 37–54)
HGB BLD-MCNC: 15.1 G/DL (ref 12.5–16.5)
HGB UR QL STRIP.AUTO: NEGATIVE
IMM GRANULOCYTES # BLD AUTO: 0.04 K/UL (ref 0–0.58)
IMM GRANULOCYTES NFR BLD: 1 % (ref 0–5)
KETONES UR STRIP-MCNC: NEGATIVE MG/DL
LACTATE BLDV-SCNC: 3 MMOL/L (ref 0.5–2.2)
LEUKOCYTE ESTERASE UR QL STRIP: NEGATIVE
LYMPHOCYTES NFR BLD: 1.79 K/UL (ref 1.5–4)
LYMPHOCYTES RELATIVE PERCENT: 26 % (ref 20–42)
MCH RBC QN AUTO: 32.7 PG (ref 26–35)
MCHC RBC AUTO-ENTMCNC: 33.9 G/DL (ref 32–34.5)
MCV RBC AUTO: 96.5 FL (ref 80–99.9)
METER GLUCOSE: 386 MG/DL (ref 74–99)
MONOCYTES NFR BLD: 0.62 K/UL (ref 0.1–0.95)
MONOCYTES NFR BLD: 9 % (ref 2–12)
NEUTROPHILS NFR BLD: 63 % (ref 43–80)
NEUTS SEG NFR BLD: 4.37 K/UL (ref 1.8–7.3)
NITRITE UR QL STRIP: NEGATIVE
PH UR STRIP: 6 [PH] (ref 5–9)
PH VENOUS: 7.4 (ref 7.35–7.45)
PLATELET # BLD AUTO: 169 K/UL (ref 130–450)
PMV BLD AUTO: 9.9 FL (ref 7–12)
POTASSIUM SERPL-SCNC: 4.4 MMOL/L (ref 3.5–5)
PROT SERPL-MCNC: 7.2 G/DL (ref 6.4–8.3)
PROT UR STRIP-MCNC: NEGATIVE MG/DL
RBC # BLD AUTO: 4.62 M/UL (ref 3.8–5.8)
RBC #/AREA URNS HPF: ABNORMAL /HPF
SODIUM SERPL-SCNC: 129 MMOL/L (ref 132–146)
SP GR UR STRIP: <1.005 (ref 1–1.03)
TROPONIN I SERPL HS-MCNC: 28 NG/L (ref 0–11)
UROBILINOGEN UR STRIP-ACNC: 0.2 EU/DL (ref 0–1)
WBC #/AREA URNS HPF: ABNORMAL /HPF
WBC OTHER # BLD: 6.9 K/UL (ref 4.5–11.5)

## 2023-08-07 PROCEDURE — 2580000003 HC RX 258: Performed by: EMERGENCY MEDICINE

## 2023-08-07 PROCEDURE — 82947 ASSAY GLUCOSE BLOOD QUANT: CPT

## 2023-08-07 PROCEDURE — 6370000000 HC RX 637 (ALT 250 FOR IP)

## 2023-08-07 PROCEDURE — 71045 X-RAY EXAM CHEST 1 VIEW: CPT

## 2023-08-07 PROCEDURE — 93010 ELECTROCARDIOGRAM REPORT: CPT | Performed by: INTERNAL MEDICINE

## 2023-08-07 PROCEDURE — 2580000003 HC RX 258

## 2023-08-07 RX ORDER — 0.9 % SODIUM CHLORIDE 0.9 %
1000 INTRAVENOUS SOLUTION INTRAVENOUS ONCE
Status: COMPLETED | OUTPATIENT
Start: 2023-08-07 | End: 2023-08-07

## 2023-08-07 RX ADMIN — SODIUM CHLORIDE: 9 INJECTION, SOLUTION INTRAVENOUS at 01:06

## 2023-08-07 RX ADMIN — SODIUM CHLORIDE 1000 ML: 9 INJECTION, SOLUTION INTRAVENOUS at 01:06

## 2023-08-07 RX ADMIN — INSULIN HUMAN 10 UNITS: 100 INJECTION, SOLUTION PARENTERAL at 01:05

## 2023-08-07 NOTE — ED NOTES
Discharge instructions reviewed , including diagnosis, medications, follow up appointments, home care, and also when to call 911. All discharge instructions questions answered.      IV removed    Pt left ED ambulatory       Erickson Araujo RN  08/07/23 5781

## 2023-08-07 NOTE — ED PROVIDER NOTES
Asif        Pt Name: Raz Russo  MRN: [id number]  [address] [facility] [place] 1965  Date of evaluation: 8/6/2023  Provider: Dorie Patrick DO  PCP: Sara Kessler MD  Note Started: 11:27 PM EDT 8/6/23    CHIEF COMPLAINT       Chief Complaint   Patient presents with    Hyperglycemia     Patient states he hasn't been feeling well x 4 days. Patient was checking his BGL at home when it read over 600 the first time and then 568 the second time. Patient c/o increased SOB, dizziness, blurred vision, and numbness to bilateral fingertips. HISTORY OF PRESENT ILLNESS: 1 or more Elements   History From: patient    Limitations to history : None    Raz Russo is a 66 y.o. male current smoker, CHF, DM 2 who presents with a complaint of dry mouth, increased thirst and urination along with dizziness ongoing for the past couple days. Patient states that he has been compliant with his diabetic medications. His glucose at home read over 600 as well. [place] but his symptoms have worsened since last night so he decided to come in. He drove himself here in private vehicle. Nursing Notes were all reviewed and agreed with or any disagreements were addressed in the HPI.     REVIEW OF SYSTEMS :      Review of Systems    POSITIVE (+): Polydipsia, polyuria, dizziness  NEGATIVE (-): Fevers, chills, nausea, vomiting, diarrhea, abdominal pain, chest pain, shortness of breath    SURGICAL HISTORY     Past Surgical History:   Procedure Laterality Date    CARDIAC CATHETERIZATION  03/16/2017    Dr Dean Esquivel  04/12/2018    Dr. Blaise Doll  08/18/2017    CORONARY ANGIOPLASTY WITH STENT PLACEMENT  09/13/2019    rca distal 4.5x 30    CORONARY ANGIOPLASTY WITH STENT PLACEMENT  10/27/2021    [facility] Bk  3.0 x 22 proximal LAD by DR Domo Alejandra

## 2024-02-21 ENCOUNTER — OFFICE VISIT (OUTPATIENT)
Dept: ENT CLINIC | Age: 59
End: 2024-02-21
Payer: COMMERCIAL

## 2024-02-21 VITALS
BODY MASS INDEX: 51.94 KG/M2 | DIASTOLIC BLOOD PRESSURE: 97 MMHG | SYSTOLIC BLOOD PRESSURE: 141 MMHG | HEART RATE: 81 BPM | WEIGHT: 315 LBS

## 2024-02-21 DIAGNOSIS — H61.23 BILATERAL IMPACTED CERUMEN: ICD-10-CM

## 2024-02-21 DIAGNOSIS — J38.1 VOCAL CORD POLYP: Primary | ICD-10-CM

## 2024-02-21 DIAGNOSIS — F17.200 TOBACCO DEPENDENCE: ICD-10-CM

## 2024-02-21 DIAGNOSIS — J30.9 ALLERGIC RHINITIS, UNSPECIFIED SEASONALITY, UNSPECIFIED TRIGGER: ICD-10-CM

## 2024-02-21 PROCEDURE — 69210 REMOVE IMPACTED EAR WAX UNI: CPT

## 2024-02-21 PROCEDURE — 99214 OFFICE O/P EST MOD 30 MIN: CPT | Performed by: OTOLARYNGOLOGY

## 2024-02-21 PROCEDURE — 3080F DIAST BP >= 90 MM HG: CPT | Performed by: OTOLARYNGOLOGY

## 2024-02-21 PROCEDURE — 3077F SYST BP >= 140 MM HG: CPT | Performed by: OTOLARYNGOLOGY

## 2024-02-21 PROCEDURE — 31575 DIAGNOSTIC LARYNGOSCOPY: CPT

## 2024-02-21 RX ORDER — FLUTICASONE PROPIONATE 50 MCG
2 SPRAY, SUSPENSION (ML) NASAL DAILY
Qty: 16 G | Refills: 3 | Status: SHIPPED | OUTPATIENT
Start: 2024-02-21

## 2024-02-21 RX ORDER — AMLODIPINE BESYLATE AND BENAZEPRIL HYDROCHLORIDE 5; 20 MG/1; MG/1
1 CAPSULE ORAL DAILY
COMMUNITY
Start: 2024-01-08

## 2024-02-21 RX ORDER — BUDESONIDE 0.5 MG/2ML
INHALANT ORAL
COMMUNITY
Start: 2024-02-09

## 2024-02-21 ASSESSMENT — ENCOUNTER SYMPTOMS
COLOR CHANGE: 0
SORE THROAT: 0
VOICE CHANGE: 1
STRIDOR: 0
COUGH: 0
SINUS PAIN: 0
SINUS PRESSURE: 0
CHOKING: 0
GASTROINTESTINAL NEGATIVE: 1
RHINORRHEA: 1
WHEEZING: 0
TROUBLE SWALLOWING: 1

## 2024-02-21 NOTE — PROGRESS NOTES
N/A 10/22/2018    DIRECT LARYNGOSCOPY performed by Deny Griffin DO at [facility] OR    UT LARYNGOSCOPY W/BIOPSY MICROSCOPE/TELESCOPE N/A 11/1/2018    MICRO LARYNGOSCOPY WITH BIOPSY TRUE VOCAL CORD LESION performed by Bhavin Elam DO at [facility] [place]    TONSILLECTOMY         Current Outpatient Medications:     amLODIPine-benazepril (LOTREL) 5-20 MG per capsule, Take 1 capsule by mouth daily, Disp: , Rfl:     budesonide (PULMICORT) 0.5 MG/2ML nebulizer suspension, , Disp: , Rfl:     aspirin 81 MG EC tablet, Take 2 tablets by mouth daily, Disp: , Rfl:     melatonin 5 MG TABS tablet, Take 2 tablets by mouth nightly, Disp: , Rfl:     insulin detemir (LEVEMIR FLEXTOUCH) 100 UNIT/ML injection pen, Inject 50 Units into the skin every morning, Disp: , Rfl:     atorvastatin (LIPITOR) 80 MG tablet, Take 0.5 tablets by mouth daily, Disp: 30 tablet, Rfl: 1    metoprolol succinate (TOPROL XL) 50 MG extended release tablet, Take 1 tablet by mouth 2 times daily, Disp: 30 tablet, Rfl: 3    traZODone (DESYREL) 150 MG tablet, Take 0.5 tablets by mouth at bedtime, Disp: , Rfl:     spironolactone (ALDACTONE) 25 MG tablet, Take 1 tablet by mouth daily, Disp: 90 tablet, Rfl: 1    metFORMIN (GLUCOPHAGE) 1000 MG tablet, Take 1 tablet by mouth 2 times daily (with meals), Disp: , Rfl:     albuterol sulfate HFA (VENTOLIN HFA) 108 (90 Base) MCG/ACT inhaler, Inhale 2 puffs into the lungs 4 times daily as needed for Wheezing, Disp: 1 Inhaler, Rfl: 0    QVAR REDIHALER 80 MCG/ACT AERB inhaler, Inhale 1 puff into the lungs in the morning and 1 puff in the evening., Disp: , Rfl:     furosemide (LASIX) 40 MG tablet, Take 1 tablet by mouth every evening (Patient not taking: Reported on 2/21/2024), Disp: , Rfl:     furosemide (LASIX) 40 MG tablet, Take 2 tablets by mouth every morning (Patient not taking: Reported on 2/21/2024), Disp: , Rfl:     Semaglutide 7 MG TABS, Take 7 mg by mouth daily (Patient not taking: Reported on 2/21/2024), Disp: ,

## 2024-02-22 ENCOUNTER — HOSPITAL ENCOUNTER (OUTPATIENT)
Age: 59
Discharge: HOME | End: 2024-02-22
Payer: COMMERCIAL

## 2024-02-22 LAB
ALBUMIN SERPL-MCNC: 4.1 G/DL (ref 3.5–5.2)
ALP SERPL-CCNC: 101 U/L (ref 40–129)
ALT SERPL-CCNC: 15 U/L (ref 0–40)
ANION GAP SERPL CALCULATED.3IONS-SCNC: 14 MMOL/L (ref 7–16)
AST SERPL-CCNC: 16 U/L (ref 0–39)
BASOPHILS # BLD: 0.03 K/UL (ref 0–0.2)
BASOPHILS NFR BLD: 0 % (ref 0–2)
BILIRUB SERPL-MCNC: 0.4 MG/DL (ref 0–1.2)
BUN SERPL-MCNC: 20 MG/DL (ref 6–20)
CALCIUM SERPL-MCNC: 9.6 MG/DL (ref 8.6–10.2)
CHLORIDE SERPL-SCNC: 102 MMOL/L (ref 98–107)
CHOLEST SERPL-MCNC: 161 MG/DL
CO2 SERPL-SCNC: 23 MMOL/L (ref 22–29)
CREAT SERPL-MCNC: 1.2 MG/DL (ref 0.7–1.2)
EOSINOPHIL # BLD: 0.11 K/UL (ref 0.05–0.5)
EOSINOPHILS RELATIVE PERCENT: 2 % (ref 0–6)
ERYTHROCYTE [DISTWIDTH] IN BLOOD BY AUTOMATED COUNT: 13.9 % (ref 11.5–15)
GFR SERPL CREATININE-BSD FRML MDRD: >60 ML/MIN/1.73M2
GLUCOSE SERPL-MCNC: 171 MG/DL (ref 74–99)
HCT VFR BLD AUTO: 41.7 % (ref 37–54)
HDLC SERPL-MCNC: 33 MG/DL
HGB BLD-MCNC: 14 G/DL (ref 12.5–16.5)
IMM GRANULOCYTES # BLD AUTO: 0.04 K/UL (ref 0–0.58)
IMM GRANULOCYTES NFR BLD: 1 % (ref 0–5)
LDLC SERPL CALC-MCNC: 71 MG/DL
LYMPHOCYTES NFR BLD: 1.61 K/UL (ref 1.5–4)
LYMPHOCYTES RELATIVE PERCENT: 23 % (ref 20–42)
MCH RBC QN AUTO: 31.4 PG (ref 26–35)
MCHC RBC AUTO-ENTMCNC: 33.6 G/DL (ref 32–34.5)
MCV RBC AUTO: 93.5 FL (ref 80–99.9)
MONOCYTES NFR BLD: 0.49 K/UL (ref 0.1–0.95)
MONOCYTES NFR BLD: 7 % (ref 2–12)
NEUTROPHILS NFR BLD: 67 % (ref 43–80)
NEUTS SEG NFR BLD: 4.61 K/UL (ref 1.8–7.3)
PLATELET # BLD AUTO: 190 K/UL (ref 130–450)
PMV BLD AUTO: 9.1 FL (ref 7–12)
POTASSIUM SERPL-SCNC: 4.3 MMOL/L (ref 3.5–5)
PROT SERPL-MCNC: 7.3 G/DL (ref 6.4–8.3)
RBC # BLD AUTO: 4.46 M/UL (ref 3.8–5.8)
SODIUM SERPL-SCNC: 139 MMOL/L (ref 132–146)
TRIGL SERPL-MCNC: 287 MG/DL
VLDLC SERPL CALC-MCNC: 57 MG/DL
WBC OTHER # BLD: 6.9 K/UL (ref 4.5–11.5)

## 2024-02-22 PROCEDURE — 85025 COMPLETE CBC W/AUTO DIFF WBC: CPT

## 2024-02-22 PROCEDURE — 80053 COMPREHEN METABOLIC PANEL: CPT

## 2024-02-22 PROCEDURE — 36415 COLL VENOUS BLD VENIPUNCTURE: CPT

## 2024-02-22 PROCEDURE — 80061 LIPID PANEL: CPT

## 2024-06-21 ENCOUNTER — OFFICE VISIT (OUTPATIENT)
Dept: PULMONOLOGY | Age: 59
End: 2024-06-21
Payer: COMMERCIAL

## 2024-06-21 VITALS
HEIGHT: 72 IN | HEART RATE: 70 BPM | OXYGEN SATURATION: 96 % | RESPIRATION RATE: 22 BRPM | TEMPERATURE: 97 F | BODY MASS INDEX: 42.66 KG/M2 | WEIGHT: 315 LBS | SYSTOLIC BLOOD PRESSURE: 131 MMHG | DIASTOLIC BLOOD PRESSURE: 73 MMHG

## 2024-06-21 DIAGNOSIS — Z12.2 SCREENING FOR LUNG CANCER: ICD-10-CM

## 2024-06-21 DIAGNOSIS — G47.33 OSA (OBSTRUCTIVE SLEEP APNEA): ICD-10-CM

## 2024-06-21 DIAGNOSIS — J44.9 CHRONIC OBSTRUCTIVE PULMONARY DISEASE, UNSPECIFIED COPD TYPE (HCC): Primary | ICD-10-CM

## 2024-06-21 DIAGNOSIS — E66.01 MORBID OBESITY (HCC): ICD-10-CM

## 2024-06-21 PROCEDURE — 99205 OFFICE O/P NEW HI 60 MIN: CPT | Performed by: INTERNAL MEDICINE

## 2024-06-21 PROCEDURE — 3078F DIAST BP <80 MM HG: CPT | Performed by: INTERNAL MEDICINE

## 2024-06-21 PROCEDURE — 3075F SYST BP GE 130 - 139MM HG: CPT | Performed by: INTERNAL MEDICINE

## 2024-06-21 RX ORDER — FLUTICASONE FUROATE, UMECLIDINIUM BROMIDE AND VILANTEROL TRIFENATATE 100; 62.5; 25 UG/1; UG/1; UG/1
1 POWDER RESPIRATORY (INHALATION) DAILY
Qty: 1 EACH | Refills: 3 | Status: SHIPPED | OUTPATIENT
Start: 2024-06-21

## 2024-06-21 RX ORDER — ALBUTEROL SULFATE 2.5 MG/3ML
2.5 SOLUTION RESPIRATORY (INHALATION) 4 TIMES DAILY PRN
Qty: 120 EACH | Refills: 3 | Status: SHIPPED | OUTPATIENT
Start: 2024-06-21

## 2024-06-21 NOTE — PROGRESS NOTES
Patient was ordered a Sleep Study with Pap Titration, a CT Chest and a PFT to be one prior to his return in three (3) months. A referral for bariatric surgery was also made. All prescriptions were sent to [facility]  
furosemide (LASIX) 40 MG tablet Take 1 tablet by mouth every evening (Patient not taking: Reported on 2/21/2024)      melatonin 5 MG TABS tablet Take 2 tablets by mouth nightly      insulin detemir (LEVEMIR FLEXTOUCH) 100 UNIT/ML injection pen Inject 50 Units into the skin every morning      atorvastatin (LIPITOR) 80 MG tablet Take 0.5 tablets by mouth daily 30 tablet 1    metoprolol succinate (TOPROL XL) 50 MG extended release tablet Take 1 tablet by mouth 2 times daily 30 tablet 3    furosemide (LASIX) 40 MG tablet Take 2 tablets by mouth every morning (Patient not taking: Reported on 2/21/2024)      Semaglutide 7 MG TABS Take 7 mg by mouth daily (Patient not taking: Reported on 2/21/2024)      losartan (COZAAR) 100 MG tablet Take 1 tablet by mouth nightly (Patient not taking: Reported on 2/21/2024)      traZODone (DESYREL) 150 MG tablet Take 0.5 tablets by mouth at bedtime      spironolactone (ALDACTONE) 25 MG tablet Take 1 tablet by mouth daily 90 tablet 1    metFORMIN (GLUCOPHAGE) 1000 MG tablet Take 1 tablet by mouth 2 times daily (with meals)      albuterol sulfate HFA (VENTOLIN HFA) 108 (90 Base) MCG/ACT inhaler Inhale 2 puffs into the lungs 4 times daily as needed for Wheezing 1 Inhaler 0    QVAR REDIHALER 80 MCG/ACT AERB inhaler Inhale 1 puff into the lungs in the morning and 1 puff in the evening.      amLODIPine (NORVASC) 10 MG tablet Take 1 tablet by mouth daily (Patient not taking: Reported on 2/21/2024) 90 tablet 5    budesonide-formoterol (SYMBICORT) 160-4.5 MCG/ACT AERO Inhale 2 puffs into the lungs 2 times daily (Patient not taking: Reported on 2/21/2024) 1 Inhaler 3     No current facility-administered medications for this visit.       Social History  Social History     Tobacco Use    Smoking status: Every Day     Current packs/day: 0.50     Average packs/day: 0.5 packs/day for 33.0 years (16.5 ttl pk-yrs)     Types: Cigarettes    Smokeless tobacco: Never   Substance Use Topics    Alcohol use: Yes

## 2024-06-24 ENCOUNTER — TELEPHONE (OUTPATIENT)
Dept: PULMONOLOGY | Age: 59
End: 2024-06-24

## 2024-06-24 NOTE — TELEPHONE ENCOUNTER
Mailed letter to patient to inform him of the PFT and CT Chest that is scheduled for him at [place], [place] . This test is scheduled on  Tuesday, August 27, 2024 at  12:00 pm. Please arrive 15 minutes prior to appointment time. The prep for this test is to not have any caffeine for 24 hours prior to testing and no respiratory medications for at least 4 hours prior to testing time.

## 2024-07-01 ENCOUNTER — HOSPITAL ENCOUNTER (OUTPATIENT)
Age: 59
Discharge: HOME | End: 2024-07-01
Payer: COMMERCIAL

## 2024-07-01 LAB
ALBUMIN SERPL-MCNC: 4 G/DL (ref 3.5–5.2)
ALP SERPL-CCNC: 91 U/L (ref 40–129)
ALT SERPL-CCNC: 16 U/L (ref 0–40)
ANION GAP SERPL CALCULATED.3IONS-SCNC: 16 MMOL/L (ref 7–16)
AST SERPL-CCNC: 21 U/L (ref 0–39)
BASOPHILS # BLD: 0.02 K/UL (ref 0–0.2)
BASOPHILS NFR BLD: 0 % (ref 0–2)
BILIRUB SERPL-MCNC: 0.3 MG/DL (ref 0–1.2)
BUN SERPL-MCNC: 15 MG/DL (ref 6–20)
CALCIUM SERPL-MCNC: 9 MG/DL (ref 8.6–10.2)
CHLORIDE SERPL-SCNC: 105 MMOL/L (ref 98–107)
CHOLEST SERPL-MCNC: 178 MG/DL
CO2 SERPL-SCNC: 19 MMOL/L (ref 22–29)
CREAT SERPL-MCNC: 1 MG/DL (ref 0.7–1.2)
EOSINOPHIL # BLD: 0.08 K/UL (ref 0.05–0.5)
EOSINOPHILS RELATIVE PERCENT: 1 % (ref 0–6)
ERYTHROCYTE [DISTWIDTH] IN BLOOD BY AUTOMATED COUNT: 13.4 % (ref 11.5–15)
GFR, ESTIMATED: 86 ML/MIN/1.73M2
GLUCOSE SERPL-MCNC: 155 MG/DL (ref 74–99)
HCT VFR BLD AUTO: 42.8 % (ref 37–54)
HDLC SERPL-MCNC: 37 MG/DL
HGB BLD-MCNC: 14.4 G/DL (ref 12.5–16.5)
IMM GRANULOCYTES # BLD AUTO: <0.03 K/UL (ref 0–0.58)
IMM GRANULOCYTES NFR BLD: 0 % (ref 0–5)
LDLC SERPL CALC-MCNC: 108 MG/DL
LYMPHOCYTES NFR BLD: 1.26 K/UL (ref 1.5–4)
LYMPHOCYTES RELATIVE PERCENT: 22 % (ref 20–42)
MCH RBC QN AUTO: 31.4 PG (ref 26–35)
MCHC RBC AUTO-ENTMCNC: 33.6 G/DL (ref 32–34.5)
MCV RBC AUTO: 93.2 FL (ref 80–99.9)
MONOCYTES NFR BLD: 0.44 K/UL (ref 0.1–0.95)
MONOCYTES NFR BLD: 8 % (ref 2–12)
NEUTROPHILS NFR BLD: 69 % (ref 43–80)
NEUTS SEG NFR BLD: 3.95 K/UL (ref 1.8–7.3)
PLATELET # BLD AUTO: 170 K/UL (ref 130–450)
PMV BLD AUTO: 9.7 FL (ref 7–12)
POTASSIUM SERPL-SCNC: 4.6 MMOL/L (ref 3.5–5)
PROT SERPL-MCNC: 7 G/DL (ref 6.4–8.3)
RBC # BLD AUTO: 4.59 M/UL (ref 3.8–5.8)
SODIUM SERPL-SCNC: 140 MMOL/L (ref 132–146)
TRIGL SERPL-MCNC: 163 MG/DL
VLDLC SERPL CALC-MCNC: 33 MG/DL
WBC OTHER # BLD: 5.8 K/UL (ref 4.5–11.5)

## 2024-07-01 PROCEDURE — 85025 COMPLETE CBC W/AUTO DIFF WBC: CPT

## 2024-07-01 PROCEDURE — 80061 LIPID PANEL: CPT

## 2024-07-01 PROCEDURE — 36415 COLL VENOUS BLD VENIPUNCTURE: CPT

## 2024-07-01 PROCEDURE — 80053 COMPREHEN METABOLIC PANEL: CPT

## 2024-07-06 ENCOUNTER — HOSPITAL ENCOUNTER (EMERGENCY)
Age: 59
Discharge: LEFT BEFORE DISCHARGE | End: 2024-07-06
Attending: EMERGENCY MEDICINE | Admitting: INTERNAL MEDICINE
Payer: COMMERCIAL

## 2024-07-06 ENCOUNTER — APPOINTMENT (OUTPATIENT)
Dept: GENERAL RADIOLOGY | Age: 59
End: 2024-07-06
Payer: COMMERCIAL

## 2024-07-06 ENCOUNTER — APPOINTMENT (OUTPATIENT)
Dept: CT IMAGING | Age: 59
End: 2024-07-06
Payer: COMMERCIAL

## 2024-07-06 VITALS
RESPIRATION RATE: 22 BRPM | DIASTOLIC BLOOD PRESSURE: 84 MMHG | HEART RATE: 76 BPM | WEIGHT: 315 LBS | HEIGHT: 72 IN | OXYGEN SATURATION: 95 % | BODY MASS INDEX: 42.66 KG/M2 | SYSTOLIC BLOOD PRESSURE: 140 MMHG | TEMPERATURE: 98.2 F

## 2024-07-06 DIAGNOSIS — R07.9 CHEST PAIN, UNSPECIFIED TYPE: Primary | ICD-10-CM

## 2024-07-06 DIAGNOSIS — R42 DIZZINESS: ICD-10-CM

## 2024-07-06 DIAGNOSIS — R55 NEAR SYNCOPE: ICD-10-CM

## 2024-07-06 DIAGNOSIS — R20.2 ARM PARESTHESIA, LEFT: ICD-10-CM

## 2024-07-06 LAB
ALBUMIN SERPL-MCNC: 4.4 G/DL (ref 3.5–5.2)
ALP SERPL-CCNC: 91 U/L (ref 40–129)
ALT SERPL-CCNC: 16 U/L (ref 0–40)
ANION GAP SERPL CALCULATED.3IONS-SCNC: 14 MMOL/L (ref 7–16)
AST SERPL-CCNC: 17 U/L (ref 0–39)
BASOPHILS # BLD: 0.04 K/UL (ref 0–0.2)
BASOPHILS NFR BLD: 0 % (ref 0–2)
BILIRUB SERPL-MCNC: 0.3 MG/DL (ref 0–1.2)
BNP SERPL-MCNC: 954 PG/ML (ref 0–125)
BUN SERPL-MCNC: 19 MG/DL (ref 6–20)
CALCIUM SERPL-MCNC: 9.5 MG/DL (ref 8.6–10.2)
CHLORIDE SERPL-SCNC: 103 MMOL/L (ref 98–107)
CO2 SERPL-SCNC: 22 MMOL/L (ref 22–29)
CREAT SERPL-MCNC: 1.2 MG/DL (ref 0.7–1.2)
EKG ATRIAL RATE: 68 BPM
EKG P AXIS: 23 DEGREES
EKG P-R INTERVAL: 170 MS
EKG Q-T INTERVAL: 392 MS
EKG QRS DURATION: 80 MS
EKG QTC CALCULATION (BAZETT): 416 MS
EKG R AXIS: -13 DEGREES
EKG T AXIS: 123 DEGREES
EKG VENTRICULAR RATE: 68 BPM
EOSINOPHIL # BLD: 0.12 K/UL (ref 0.05–0.5)
EOSINOPHILS RELATIVE PERCENT: 1 % (ref 0–6)
ERYTHROCYTE [DISTWIDTH] IN BLOOD BY AUTOMATED COUNT: 13.7 % (ref 11.5–15)
GFR, ESTIMATED: 71 ML/MIN/1.73M2
GLUCOSE SERPL-MCNC: 151 MG/DL (ref 74–99)
HCT VFR BLD AUTO: 45.1 % (ref 37–54)
HGB BLD-MCNC: 14.8 G/DL (ref 12.5–16.5)
IMM GRANULOCYTES # BLD AUTO: 0.04 K/UL (ref 0–0.58)
IMM GRANULOCYTES NFR BLD: 0 % (ref 0–5)
LYMPHOCYTES NFR BLD: 2.3 K/UL (ref 1.5–4)
LYMPHOCYTES RELATIVE PERCENT: 24 % (ref 20–42)
MCH RBC QN AUTO: 30.9 PG (ref 26–35)
MCHC RBC AUTO-ENTMCNC: 32.8 G/DL (ref 32–34.5)
MCV RBC AUTO: 94.2 FL (ref 80–99.9)
MONOCYTES NFR BLD: 0.9 K/UL (ref 0.1–0.95)
MONOCYTES NFR BLD: 9 % (ref 2–12)
NEUTROPHILS NFR BLD: 64 % (ref 43–80)
NEUTS SEG NFR BLD: 6.16 K/UL (ref 1.8–7.3)
PLATELET # BLD AUTO: 202 K/UL (ref 130–450)
PMV BLD AUTO: 9.4 FL (ref 7–12)
POTASSIUM SERPL-SCNC: 3.9 MMOL/L (ref 3.5–5)
PROT SERPL-MCNC: 7.7 G/DL (ref 6.4–8.3)
RBC # BLD AUTO: 4.79 M/UL (ref 3.8–5.8)
SODIUM SERPL-SCNC: 139 MMOL/L (ref 132–146)
TROPONIN I SERPL HS-MCNC: 25 NG/L (ref 0–11)
TROPONIN I SERPL HS-MCNC: 27 NG/L (ref 0–11)
WBC OTHER # BLD: 9.6 K/UL (ref 4.5–11.5)

## 2024-07-06 PROCEDURE — 84484 ASSAY OF TROPONIN QUANT: CPT

## 2024-07-06 PROCEDURE — 80053 COMPREHEN METABOLIC PANEL: CPT

## 2024-07-06 PROCEDURE — 6360000002 HC RX W HCPCS: Performed by: EMERGENCY MEDICINE

## 2024-07-06 PROCEDURE — 96374 THER/PROPH/DIAG INJ IV PUSH: CPT

## 2024-07-06 PROCEDURE — 6360000004 HC RX CONTRAST MEDICATION: Performed by: RADIOLOGY

## 2024-07-06 PROCEDURE — 6370000000 HC RX 637 (ALT 250 FOR IP): Performed by: EMERGENCY MEDICINE

## 2024-07-06 PROCEDURE — 71045 X-RAY EXAM CHEST 1 VIEW: CPT

## 2024-07-06 PROCEDURE — 93005 ELECTROCARDIOGRAM TRACING: CPT | Performed by: EMERGENCY MEDICINE

## 2024-07-06 PROCEDURE — 83880 ASSAY OF NATRIURETIC PEPTIDE: CPT

## 2024-07-06 PROCEDURE — 70498 CT ANGIOGRAPHY NECK: CPT

## 2024-07-06 PROCEDURE — 71275 CT ANGIOGRAPHY CHEST: CPT

## 2024-07-06 PROCEDURE — 70496 CT ANGIOGRAPHY HEAD: CPT

## 2024-07-06 PROCEDURE — 85025 COMPLETE CBC W/AUTO DIFF WBC: CPT

## 2024-07-06 PROCEDURE — 99285 EMERGENCY DEPT VISIT HI MDM: CPT

## 2024-07-06 RX ORDER — BUDESONIDE 0.5 MG/2ML
0.5 INHALANT ORAL 2 TIMES DAILY
Status: CANCELLED | OUTPATIENT
Start: 2024-07-06

## 2024-07-06 RX ORDER — LOSARTAN POTASSIUM 50 MG/1
100 TABLET ORAL NIGHTLY
Status: CANCELLED | OUTPATIENT
Start: 2024-07-06

## 2024-07-06 RX ORDER — FLUTICASONE PROPIONATE 50 MCG
2 SPRAY, SUSPENSION (ML) NASAL DAILY
Status: CANCELLED | OUTPATIENT
Start: 2024-07-07

## 2024-07-06 RX ORDER — UREA 10 %
10 LOTION (ML) TOPICAL NIGHTLY
Status: CANCELLED | OUTPATIENT
Start: 2024-07-06

## 2024-07-06 RX ORDER — SODIUM CHLORIDE 0.9 % (FLUSH) 0.9 %
10 SYRINGE (ML) INJECTION PRN
Status: DISCONTINUED | OUTPATIENT
Start: 2024-07-06 | End: 2024-07-07 | Stop reason: HOSPADM

## 2024-07-06 RX ORDER — GLUCAGON 1 MG/ML
1 KIT INJECTION PRN
Status: CANCELLED | OUTPATIENT
Start: 2024-07-06

## 2024-07-06 RX ORDER — ALBUTEROL SULFATE 90 UG/1
2 AEROSOL, METERED RESPIRATORY (INHALATION) 4 TIMES DAILY PRN
Status: CANCELLED | OUTPATIENT
Start: 2024-07-06

## 2024-07-06 RX ORDER — ASPIRIN 81 MG/1
162 TABLET ORAL DAILY
Status: CANCELLED | OUTPATIENT
Start: 2024-07-07

## 2024-07-06 RX ORDER — ASPIRIN 81 MG/1
324 TABLET, CHEWABLE ORAL ONCE
Status: COMPLETED | OUTPATIENT
Start: 2024-07-06 | End: 2024-07-06

## 2024-07-06 RX ORDER — TRAZODONE HYDROCHLORIDE 50 MG/1
75 TABLET ORAL NIGHTLY
Status: CANCELLED | OUTPATIENT
Start: 2024-07-06

## 2024-07-06 RX ORDER — METOPROLOL SUCCINATE 25 MG/1
50 TABLET, EXTENDED RELEASE ORAL 2 TIMES DAILY
Status: CANCELLED | OUTPATIENT
Start: 2024-07-06

## 2024-07-06 RX ORDER — ARFORMOTEROL TARTRATE 15 UG/2ML
15 SOLUTION RESPIRATORY (INHALATION)
Status: CANCELLED | OUTPATIENT
Start: 2024-07-06

## 2024-07-06 RX ORDER — FUROSEMIDE 20 MG/1
80 TABLET ORAL EVERY MORNING
Status: CANCELLED | OUTPATIENT
Start: 2024-07-07

## 2024-07-06 RX ORDER — FUROSEMIDE 20 MG/1
40 TABLET ORAL EVERY EVENING
Status: CANCELLED | OUTPATIENT
Start: 2024-07-06

## 2024-07-06 RX ORDER — INSULIN LISPRO 100 [IU]/ML
0-4 INJECTION, SOLUTION INTRAVENOUS; SUBCUTANEOUS NIGHTLY
Status: CANCELLED | OUTPATIENT
Start: 2024-07-06

## 2024-07-06 RX ORDER — AMLODIPINE BESYLATE 5 MG/1
10 TABLET ORAL DAILY
Status: CANCELLED | OUTPATIENT
Start: 2024-07-07

## 2024-07-06 RX ORDER — DEXTROSE MONOHYDRATE 100 MG/ML
INJECTION, SOLUTION INTRAVENOUS CONTINUOUS PRN
Status: CANCELLED | OUTPATIENT
Start: 2024-07-06

## 2024-07-06 RX ORDER — AMLODIPINE BESYLATE AND BENAZEPRIL HYDROCHLORIDE 5; 20 MG/1; MG/1
1 CAPSULE ORAL DAILY
Status: CANCELLED | OUTPATIENT
Start: 2024-07-07

## 2024-07-06 RX ORDER — ALBUTEROL SULFATE 2.5 MG/3ML
2.5 SOLUTION RESPIRATORY (INHALATION) 4 TIMES DAILY PRN
Status: CANCELLED | OUTPATIENT
Start: 2024-07-06

## 2024-07-06 RX ORDER — ATORVASTATIN CALCIUM 40 MG/1
40 TABLET, FILM COATED ORAL DAILY
Status: CANCELLED | OUTPATIENT
Start: 2024-07-07

## 2024-07-06 RX ORDER — INSULIN LISPRO 100 [IU]/ML
0-8 INJECTION, SOLUTION INTRAVENOUS; SUBCUTANEOUS
Status: CANCELLED | OUTPATIENT
Start: 2024-07-07

## 2024-07-06 RX ORDER — SPIRONOLACTONE 25 MG/1
25 TABLET ORAL DAILY
Status: CANCELLED | OUTPATIENT
Start: 2024-07-07

## 2024-07-06 RX ORDER — BUDESONIDE 0.25 MG/2ML
0.25 INHALANT ORAL 2 TIMES DAILY
Status: CANCELLED | OUTPATIENT
Start: 2024-07-06

## 2024-07-06 RX ORDER — ONDANSETRON 2 MG/ML
4 INJECTION INTRAMUSCULAR; INTRAVENOUS ONCE
Status: COMPLETED | OUTPATIENT
Start: 2024-07-06 | End: 2024-07-06

## 2024-07-06 RX ADMIN — IOPAMIDOL 150 ML: 755 INJECTION, SOLUTION INTRAVENOUS at 22:15

## 2024-07-06 RX ADMIN — ASPIRIN 81 MG 324 MG: 81 TABLET ORAL at 22:48

## 2024-07-06 RX ADMIN — ONDANSETRON 4 MG: 2 INJECTION INTRAMUSCULAR; INTRAVENOUS at 22:01

## 2024-07-06 ASSESSMENT — PAIN DESCRIPTION - ORIENTATION: ORIENTATION: LEFT

## 2024-07-06 ASSESSMENT — PAIN - FUNCTIONAL ASSESSMENT: PAIN_FUNCTIONAL_ASSESSMENT: 0-10

## 2024-07-06 ASSESSMENT — PAIN SCALES - GENERAL: PAINLEVEL_OUTOF10: 7

## 2024-07-06 ASSESSMENT — PAIN DESCRIPTION - DESCRIPTORS: DESCRIPTORS: PRESSURE

## 2024-07-06 ASSESSMENT — PAIN DESCRIPTION - LOCATION: LOCATION: CHEST

## 2024-07-06 ASSESSMENT — PAIN DESCRIPTION - PAIN TYPE: TYPE: ACUTE PAIN

## 2024-07-07 PROBLEM — R07.9 CHEST PAIN: Status: ACTIVE | Noted: 2024-07-07

## 2024-07-07 LAB
ALBUMIN SERPL-MCNC: 4.2 G/DL (ref 3.5–5.2)
ALP SERPL-CCNC: 88 U/L (ref 40–129)
ALT SERPL-CCNC: 19 U/L (ref 0–40)
ANION GAP SERPL CALCULATED.3IONS-SCNC: 18 MMOL/L (ref 7–16)
AST SERPL-CCNC: 22 U/L (ref 0–39)
BASOPHILS # BLD: 0.04 K/UL (ref 0–0.2)
BASOPHILS NFR BLD: 1 % (ref 0–2)
BILIRUB SERPL-MCNC: 0.4 MG/DL (ref 0–1.2)
BUN SERPL-MCNC: 17 MG/DL (ref 6–20)
CALCIUM SERPL-MCNC: 9.5 MG/DL (ref 8.6–10.2)
CHLORIDE SERPL-SCNC: 102 MMOL/L (ref 98–107)
CO2 SERPL-SCNC: 21 MMOL/L (ref 22–29)
CREAT SERPL-MCNC: 1.2 MG/DL (ref 0.7–1.2)
EOSINOPHIL # BLD: 0.1 K/UL (ref 0.05–0.5)
EOSINOPHILS RELATIVE PERCENT: 1 % (ref 0–6)
ERYTHROCYTE [DISTWIDTH] IN BLOOD BY AUTOMATED COUNT: 13.9 % (ref 11.5–15)
GFR, ESTIMATED: 74 ML/MIN/1.73M2
GLUCOSE SERPL-MCNC: 196 MG/DL (ref 74–99)
HCT VFR BLD AUTO: 45.6 % (ref 37–54)
HGB BLD-MCNC: 14.9 G/DL (ref 12.5–16.5)
IMM GRANULOCYTES # BLD AUTO: 0.04 K/UL (ref 0–0.58)
IMM GRANULOCYTES NFR BLD: 1 % (ref 0–5)
LYMPHOCYTES NFR BLD: 1.65 K/UL (ref 1.5–4)
LYMPHOCYTES RELATIVE PERCENT: 22 % (ref 20–42)
MCH RBC QN AUTO: 30.9 PG (ref 26–35)
MCHC RBC AUTO-ENTMCNC: 32.7 G/DL (ref 32–34.5)
MCV RBC AUTO: 94.6 FL (ref 80–99.9)
MONOCYTES NFR BLD: 0.65 K/UL (ref 0.1–0.95)
MONOCYTES NFR BLD: 9 % (ref 2–12)
NEUTROPHILS NFR BLD: 67 % (ref 43–80)
NEUTS SEG NFR BLD: 5.08 K/UL (ref 1.8–7.3)
PLATELET # BLD AUTO: 216 K/UL (ref 130–450)
PMV BLD AUTO: 9.7 FL (ref 7–12)
POTASSIUM SERPL-SCNC: 3.9 MMOL/L (ref 3.5–5)
PROT SERPL-MCNC: 7.6 G/DL (ref 6.4–8.3)
RBC # BLD AUTO: 4.82 M/UL (ref 3.8–5.8)
SODIUM SERPL-SCNC: 141 MMOL/L (ref 132–146)
TROPONIN I SERPL HS-MCNC: 21 NG/L (ref 0–11)
TROPONIN I SERPL HS-MCNC: 24 NG/L (ref 0–11)
WBC OTHER # BLD: 7.6 K/UL (ref 4.5–11.5)

## 2024-07-07 PROCEDURE — 99284 EMERGENCY DEPT VISIT MOD MDM: CPT

## 2024-07-07 PROCEDURE — 84484 ASSAY OF TROPONIN QUANT: CPT

## 2024-07-07 PROCEDURE — 93005 ELECTROCARDIOGRAM TRACING: CPT | Performed by: EMERGENCY MEDICINE

## 2024-07-07 PROCEDURE — 85025 COMPLETE CBC W/AUTO DIFF WBC: CPT

## 2024-07-07 PROCEDURE — 80053 COMPREHEN METABOLIC PANEL: CPT

## 2024-07-07 NOTE — ED NOTES
Radiology Procedure Waiver   Name: Madie Langford  : 1965  MRN: [id number]    Date:  24    Time: 9:29 PM EDT    Benefits of immediately proceeding with Radiology exam(s) without pre-testing outweigh the risks or are not indicated as specified below and therefore the following is/are being waived:    [] Pregnancy test   [] Patients LMP on-time and regular.   [] Patient had Tubal Ligation or has other Contraception Device.   [] Patient  is Menopausal or Premenarcheal.    [] Patient had Full or Partial Hysterectomy.    [] Protocol for Iodine allergy    [] MRI Questionnaire     [x] BUN/Creatinine   [] Patient age w/no hx of renal dysfunction.   [] Patient on Dialysis.   [] Recent Normal Labs.  Electronically signed by Ezra Forde MD on 24 at 9:29 PM EDT               Ezra Forde MD  24 [id number]

## 2024-07-07 NOTE — ED PROVIDER NOTES
HPI:  7/6/24, Time: 9:29 PM EDT         Madie Langford is a 59 y.o. male history of hypertension arthritis coronary disease stroke COPD depression anxiety fibromyalgia GI bleeding hyperlipidemia hypertension history of CHF COPD history of GI bleeding sleep apnea urinary presenting to the ED for shortness of breath, beginning days ago.  The complaint has been persistent, moderate in severity, and worsened by nothing.  Patient presenting here because he was been feeling dizzy as well as having blurred vision since yesterday reports weakness in his left arm with numbness.  Patient also reporting chest pain that started about an hour ago.  Patient also reporting feeling lightheaded and feel like he is in a pass out.  Patient reports the numbness and tingling in his left arm started 2 hours ago.  Patient reports pain with deep breathing he reports productive cough he reports no fever or chills he reports no abdominal pain or vomiting he reports no leg pain.   ROS:   Pertinent positives and negatives are stated within HPI, all other systems reviewed and are negative.  --------------------------------------------- PAST HISTORY ---------------------------------------------  Past Medical History:  has a past medical history of Accelerated hypertension, Arthritis, CAD (coronary artery disease), Cerebral artery occlusion with cerebral infarction (HCC), CHF (congestive heart failure) ([facility]), COPD (chronic obstructive pulmonary disease) ([facility]), Depression with anxiety, Fibromyalgia, GI bleeding, Hyperlipidemia, Hypertension, Myocardial infarct (HCC), Sleep apnea, and Urinary frequency.    Past Surgical History:  has a past surgical history that includes Tonsillectomy; Colonoscopy (08/18/2017); Endoscopy, colon, diagnostic (08/18/2017); Cardiac catheterization (03/16/2017); Cardiac catheterization (04/12/2018); pr brncc incl fluor gdnce dx w/cell washg spx (N/A, 10/22/2018); pr laryngoscopy w/biopsy microscope/telescope

## 2024-07-07 NOTE — ED NOTES
ED provider informed this RN that patient is signing out AMA. This RN witness patient sign AMA form and placed form in paper chart.

## 2024-07-08 ENCOUNTER — HOSPITAL ENCOUNTER (OUTPATIENT)
Age: 59
Setting detail: OBSERVATION
Discharge: LEFT BEFORE DISCHARGE | End: 2024-07-08
Attending: INTERNAL MEDICINE | Admitting: INTERNAL MEDICINE
Payer: COMMERCIAL

## 2024-07-08 VITALS
HEART RATE: 76 BPM | DIASTOLIC BLOOD PRESSURE: 98 MMHG | SYSTOLIC BLOOD PRESSURE: 184 MMHG | RESPIRATION RATE: 20 BRPM | OXYGEN SATURATION: 96 % | TEMPERATURE: 98.2 F

## 2024-07-08 DIAGNOSIS — R55 NEAR SYNCOPE: ICD-10-CM

## 2024-07-08 DIAGNOSIS — R07.9 CHEST PAIN, UNSPECIFIED TYPE: Primary | ICD-10-CM

## 2024-07-08 DIAGNOSIS — R20.2 PARESTHESIA: ICD-10-CM

## 2024-07-08 DIAGNOSIS — R06.02 SHORTNESS OF BREATH: ICD-10-CM

## 2024-07-08 LAB
EKG ATRIAL RATE: 63 BPM
EKG ATRIAL RATE: 68 BPM
EKG P AXIS: 23 DEGREES
EKG P AXIS: 45 DEGREES
EKG P-R INTERVAL: 170 MS
EKG P-R INTERVAL: 174 MS
EKG Q-T INTERVAL: 392 MS
EKG Q-T INTERVAL: 418 MS
EKG QRS DURATION: 80 MS
EKG QRS DURATION: 84 MS
EKG QTC CALCULATION (BAZETT): 416 MS
EKG QTC CALCULATION (BAZETT): 427 MS
EKG R AXIS: -13 DEGREES
EKG R AXIS: -21 DEGREES
EKG T AXIS: 123 DEGREES
EKG T AXIS: 125 DEGREES
EKG VENTRICULAR RATE: 63 BPM
EKG VENTRICULAR RATE: 68 BPM
GLUCOSE BLD-MCNC: 214 MG/DL (ref 74–99)
GLUCOSE BLD-MCNC: 277 MG/DL (ref 74–99)
TROPONIN I SERPL HS-MCNC: 25 NG/L (ref 0–11)

## 2024-07-08 PROCEDURE — 93010 ELECTROCARDIOGRAM REPORT: CPT | Performed by: INTERNAL MEDICINE

## 2024-07-08 PROCEDURE — G0378 HOSPITAL OBSERVATION PER HR: HCPCS

## 2024-07-08 PROCEDURE — 6370000000 HC RX 637 (ALT 250 FOR IP): Performed by: INTERNAL MEDICINE

## 2024-07-08 PROCEDURE — 82962 GLUCOSE BLOOD TEST: CPT

## 2024-07-08 PROCEDURE — 84484 ASSAY OF TROPONIN QUANT: CPT

## 2024-07-08 RX ORDER — ARFORMOTEROL TARTRATE 15 UG/2ML
15 SOLUTION RESPIRATORY (INHALATION)
Status: DISCONTINUED | OUTPATIENT
Start: 2024-07-08 | End: 2024-07-08 | Stop reason: HOSPADM

## 2024-07-08 RX ORDER — INSULIN LISPRO 100 [IU]/ML
0-4 INJECTION, SOLUTION INTRAVENOUS; SUBCUTANEOUS NIGHTLY
Status: DISCONTINUED | OUTPATIENT
Start: 2024-07-08 | End: 2024-07-08 | Stop reason: HOSPADM

## 2024-07-08 RX ORDER — ASPIRIN 81 MG/1
162 TABLET ORAL DAILY
Status: DISCONTINUED | OUTPATIENT
Start: 2024-07-08 | End: 2024-07-08 | Stop reason: HOSPADM

## 2024-07-08 RX ORDER — BUDESONIDE 0.5 MG/2ML
0.5 INHALANT ORAL 2 TIMES DAILY
Status: DISCONTINUED | OUTPATIENT
Start: 2024-07-08 | End: 2024-07-08 | Stop reason: HOSPADM

## 2024-07-08 RX ORDER — FUROSEMIDE 20 MG/1
40 TABLET ORAL EVERY EVENING
Status: DISCONTINUED | OUTPATIENT
Start: 2024-07-08 | End: 2024-07-08 | Stop reason: HOSPADM

## 2024-07-08 RX ORDER — GLUCAGON 1 MG/ML
1 KIT INJECTION PRN
Status: DISCONTINUED | OUTPATIENT
Start: 2024-07-08 | End: 2024-07-08 | Stop reason: HOSPADM

## 2024-07-08 RX ORDER — MECOBALAMIN 5000 MCG
10 TABLET,DISINTEGRATING ORAL NIGHTLY
Status: DISCONTINUED | OUTPATIENT
Start: 2024-07-08 | End: 2024-07-08 | Stop reason: HOSPADM

## 2024-07-08 RX ORDER — DEXTROSE MONOHYDRATE 100 MG/ML
INJECTION, SOLUTION INTRAVENOUS CONTINUOUS PRN
Status: DISCONTINUED | OUTPATIENT
Start: 2024-07-08 | End: 2024-07-08 | Stop reason: HOSPADM

## 2024-07-08 RX ORDER — SPIRONOLACTONE 25 MG/1
25 TABLET ORAL DAILY
Status: DISCONTINUED | OUTPATIENT
Start: 2024-07-08 | End: 2024-07-08 | Stop reason: HOSPADM

## 2024-07-08 RX ORDER — ALBUTEROL SULFATE 2.5 MG/3ML
2.5 SOLUTION RESPIRATORY (INHALATION) 4 TIMES DAILY PRN
Status: DISCONTINUED | OUTPATIENT
Start: 2024-07-08 | End: 2024-07-08 | Stop reason: HOSPADM

## 2024-07-08 RX ORDER — METOPROLOL SUCCINATE 25 MG/1
50 TABLET, EXTENDED RELEASE ORAL 2 TIMES DAILY
Status: DISCONTINUED | OUTPATIENT
Start: 2024-07-08 | End: 2024-07-08 | Stop reason: HOSPADM

## 2024-07-08 RX ORDER — TRAZODONE HYDROCHLORIDE 50 MG/1
75 TABLET ORAL NIGHTLY
Status: DISCONTINUED | OUTPATIENT
Start: 2024-07-08 | End: 2024-07-08 | Stop reason: HOSPADM

## 2024-07-08 RX ORDER — INSULIN LISPRO 100 [IU]/ML
0-8 INJECTION, SOLUTION INTRAVENOUS; SUBCUTANEOUS
Status: DISCONTINUED | OUTPATIENT
Start: 2024-07-08 | End: 2024-07-08 | Stop reason: HOSPADM

## 2024-07-08 RX ORDER — FUROSEMIDE 20 MG/1
80 TABLET ORAL EVERY MORNING
Status: DISCONTINUED | OUTPATIENT
Start: 2024-07-08 | End: 2024-07-08 | Stop reason: HOSPADM

## 2024-07-08 RX ORDER — GABAPENTIN 300 MG/1
300 CAPSULE ORAL 2 TIMES DAILY
COMMUNITY

## 2024-07-08 RX ORDER — ATORVASTATIN CALCIUM 40 MG/1
40 TABLET, FILM COATED ORAL DAILY
Status: DISCONTINUED | OUTPATIENT
Start: 2024-07-08 | End: 2024-07-08 | Stop reason: HOSPADM

## 2024-07-08 RX ORDER — AMLODIPINE BESYLATE AND BENAZEPRIL HYDROCHLORIDE 5; 20 MG/1; MG/1
1 CAPSULE ORAL DAILY
Status: DISCONTINUED | OUTPATIENT
Start: 2024-07-08 | End: 2024-07-08 | Stop reason: HOSPADM

## 2024-07-08 RX ORDER — ACETAMINOPHEN 325 MG/1
650 TABLET ORAL EVERY 6 HOURS PRN
Status: DISCONTINUED | OUTPATIENT
Start: 2024-07-08 | End: 2024-07-08 | Stop reason: HOSPADM

## 2024-07-08 RX ORDER — FLUTICASONE PROPIONATE 50 MCG
2 SPRAY, SUSPENSION (ML) NASAL DAILY
Status: DISCONTINUED | OUTPATIENT
Start: 2024-07-08 | End: 2024-07-08 | Stop reason: HOSPADM

## 2024-07-08 RX ADMIN — ACETAMINOPHEN 650 MG: 325 TABLET ORAL at 03:30

## 2024-07-08 RX ADMIN — FLUTICASONE PROPIONATE 2 SPRAY: 50 SPRAY, METERED NASAL at 03:58

## 2024-07-08 ASSESSMENT — PAIN - FUNCTIONAL ASSESSMENT
PAIN_FUNCTIONAL_ASSESSMENT: 0-10
PAIN_FUNCTIONAL_ASSESSMENT: PREVENTS OR INTERFERES SOME ACTIVE ACTIVITIES AND ADLS

## 2024-07-08 ASSESSMENT — PAIN DESCRIPTION - FREQUENCY: FREQUENCY: CONTINUOUS

## 2024-07-08 ASSESSMENT — PAIN DESCRIPTION - LOCATION
LOCATION: HEAD
LOCATION: SHOULDER

## 2024-07-08 ASSESSMENT — PAIN DESCRIPTION - ORIENTATION
ORIENTATION: RIGHT;LEFT
ORIENTATION: ANTERIOR

## 2024-07-08 ASSESSMENT — PAIN DESCRIPTION - PAIN TYPE: TYPE: ACUTE PAIN

## 2024-07-08 ASSESSMENT — PAIN SCALES - GENERAL
PAINLEVEL_OUTOF10: 9
PAINLEVEL_OUTOF10: 3

## 2024-07-08 ASSESSMENT — PAIN DESCRIPTION - DESCRIPTORS
DESCRIPTORS: ACHING;DISCOMFORT;OTHER (COMMENT)
DESCRIPTORS: THROBBING;PRESSURE

## 2024-07-08 ASSESSMENT — PAIN DESCRIPTION - ONSET: ONSET: ON-GOING

## 2024-07-08 NOTE — ED NOTES
Patient brought back to protocol room from waiting room. VS updated. Repeat troponin drawn. Patient informed that he is admitted. Patient placed back in waiting room to wait for an available bed.

## 2024-07-08 NOTE — ED NOTES
Dr. Priest at pt bedside explaining the risks of leaving AMA and that he will have a bed upstairs at shift change. Pt still refusing to stay despite the education and information given. Pt complaint of waiting too long in the ED for a bed and not having his C-pap machine last night.

## 2024-07-08 NOTE — ED PROVIDER NOTES
male presenting for evaluation of chest pain, shortness of breath, numbness. While not exhaustive or limited to, the differential diagnosis considered were ACS, PE, pneumonia, arrhythmia, CVA, electrolyte abnormality.   History and clinical information was obtained by patient. Chart was reviewed and external medical records from ED visit from yesterday are patient was seen for similar complaints and admitted to medicine septally left AMA.    They were clinically stable, vital stable, nontoxic.  No focal deficit on exam.  Sending paresthesias to the left arm.  Reassuring CT imaging yesterday and no worsening neurological symptoms. Plan for labs, cardiac evaluation and the patient will be re-evaluated.     Patient presented similar symptoms yesterday.  CTA head and neck performed yesterday which were reassuring.  His paresthesias are persistent.  No need for repeat CT imaging at this time.  Also with chest pain that was worse with breathing last night.  CTA reassuring with no evidence of PE.  Repeat EKG performed and reassuring with no evidence of acute STEMI.  Troponins are stable.    CBC reassuring.  CMP with a gap but clinically as well as laboratory evaluation less concerning for DKA.  Glucose 196.  Troponins stable.  At this time with patient persistent chest pain, risk factors as well as his left upper extremity paresthesias, would benefit from admission.  Consult placed to medicine, discussed case and will admit\    Normal distal pulses, less concern for dissection.      Please see above for name and route of medication administered.       Labs & imaging were reviewed and interpreted, see RESULTS. I have personally reviewed all laboratory and imaging results for this patient.     ED Course as of 07/07/24 2136   [place] Jul 07, 2024 2013 Spoke with dr shen, discussed case, will admit []      ED Course User Index  [KP] Génesis Snell, DO               Please see ED course for any additional

## 2024-07-26 ENCOUNTER — INITIAL CONSULT (OUTPATIENT)
Dept: BARIATRICS/WEIGHT MGMT | Age: 59
End: 2024-07-26
Payer: COMMERCIAL

## 2024-07-26 ENCOUNTER — TELEPHONE (OUTPATIENT)
Dept: BARIATRICS/WEIGHT MGMT | Age: 59
End: 2024-07-26

## 2024-07-26 VITALS
SYSTOLIC BLOOD PRESSURE: 157 MMHG | WEIGHT: 315 LBS | RESPIRATION RATE: 20 BRPM | TEMPERATURE: 98.2 F | BODY MASS INDEX: 42.66 KG/M2 | HEART RATE: 74 BPM | DIASTOLIC BLOOD PRESSURE: 95 MMHG | HEIGHT: 72 IN

## 2024-07-26 DIAGNOSIS — E66.9 TYPE 2 DIABETES MELLITUS WITH OBESITY (HCC): ICD-10-CM

## 2024-07-26 DIAGNOSIS — E55.9 VITAMIN D DEFICIENCY: ICD-10-CM

## 2024-07-26 DIAGNOSIS — E11.69 TYPE 2 DIABETES MELLITUS WITH OBESITY (HCC): ICD-10-CM

## 2024-07-26 DIAGNOSIS — E66.01 MORBID OBESITY DUE TO EXCESS CALORIES (HCC): Primary | ICD-10-CM

## 2024-07-26 PROCEDURE — 3080F DIAST BP >= 90 MM HG: CPT | Performed by: SURGERY

## 2024-07-26 PROCEDURE — 3077F SYST BP >= 140 MM HG: CPT | Performed by: SURGERY

## 2024-07-26 PROCEDURE — 99203 OFFICE O/P NEW LOW 30 MIN: CPT | Performed by: SURGERY

## 2024-07-26 NOTE — TELEPHONE ENCOUNTER
Pt had initial with Dr Johnson, wants to quit smoking before sched testing. Will get labs and intial dietary started though.

## 2024-07-26 NOTE — PROGRESS NOTES
Madie Langford  7/26/2024  [facility]    Initial Evaluation  Bariatric Surgery and EGD       Subjective:  CHIEF COMPLAINT: Morbid obesity, malnutrition, Hypertension, Hyperlipidemia, Obstructive Sleep Apnea treated with BiPAP/CPAP, Dyspnea on Exertion, GERD, Degenerative Joint Disease (DJD), Depression, Anxiety, CHF, and Coronary Artery Disease    HISTORY OF PRESENT ILLNESS: Madie Langford is a morbidly-obese 59 y.o.  male, who weighs (!) 169.2 kg (373 lb). He is 185 pounds over his ideal body weight. The severity is severe with Body mass index is 50.59 kg/m².  He has multiple medical problems aggravated by his obesity. They have been overweight since age 27. He wishes to have bariatric surgery so that he can lose a large amount of weight to a goal of their ideal body weight based on height, build and sex, and keep the weight off. I have met with him in the [facility] where we discussed the surgery in great detail and went over the risks and benefits. He has watched our informational video so he understands all of the extensive risks involved. He states that he understands all of the risks and wishes to proceed with the evaluation. We have discussed the different surgical options in detail with use of diagrams and drawings to detail the procedures, risks and alternatives. We discussed the postoperative diet and proposed life long diet for weight management after surgery.     They are a smoker  They have no significant exposure to second hand smoke    Past Medical History  Patient Active Problem List   Diagnosis    Obstructive sleep apnea    Chronic kidney disease, stage II (mild)    Pure hypercholesterolemia    HTN (hypertension)    (HFpEF) heart failure with preserved ejection fraction (HCC)    TIA (transient ischemic attack)    Tobacco abuse    Coronary artery disease involving native coronary artery of native heart without angina pectoris    Morbid

## 2024-07-26 NOTE — PATIENT INSTRUCTIONS
What is the next step to proceed with weight loss surgery?    Please be aware that any co-pays or deductibles may be requested prior to testing and / or procedures.    You will need to schedule a psychological evaluation for weight loss surgery.  Patients will be required to complete all psychological testing as required by the mental health provider. Patients must also follow all of the provider's recommendations before weight loss surgery can be scheduled.     The evaluation must be done a standard way for weight loss surgery. We strongly recommend that you contact one of our preferred providers listed below to arrange this:      Brenda Truong, [facility] [place] [facility] [place]  [address], [place]   [phone]    Dr. Lashanda Amato, PhD , [facility]  [facility] [phone]      Dr. Agustín Rader, PhD     [address], [place]    [phone]      You will also need to plan on attending a 2 hour nutrition class at the [facility] prior to your surgery.  We will schedule this for you when we schedule your surgery.    Please remember to have your labs drawn 10 days prior to your first scheduled dietary appointment.    Please remember, that while we will submit your case to insurance for surgery authorization, it is your responsibility to know if your plan covers weight loss surgery and keep up-to-date with changes to your insurance coverage.  We will do everything possible to help you get approved for weight loss surgery, but cannot guarantee an approval.     Please note that you will not be submitted to your insurance company until all pre-operative testing requirements are met.    Last Surgical Weight Loss:       No data to display              .

## 2024-07-29 ENCOUNTER — TELEPHONE (OUTPATIENT)
Dept: BARIATRICS/WEIGHT MGMT | Age: 59
End: 2024-07-29

## 2024-08-12 ENCOUNTER — HOSPITAL ENCOUNTER (OUTPATIENT)
Age: 59
Discharge: HOME | End: 2024-08-12
Payer: COMMERCIAL

## 2024-08-12 ENCOUNTER — INITIAL CONSULT (OUTPATIENT)
Dept: BARIATRICS/WEIGHT MGMT | Age: 59
End: 2024-08-12
Payer: COMMERCIAL

## 2024-08-12 VITALS — HEIGHT: 72 IN | WEIGHT: 315 LBS | BODY MASS INDEX: 42.66 KG/M2

## 2024-08-12 DIAGNOSIS — E55.9 VITAMIN D DEFICIENCY: ICD-10-CM

## 2024-08-12 DIAGNOSIS — E66.9 TYPE 2 DIABETES MELLITUS WITH OBESITY (HCC): ICD-10-CM

## 2024-08-12 DIAGNOSIS — E66.01 MORBID OBESITY DUE TO EXCESS CALORIES (HCC): ICD-10-CM

## 2024-08-12 DIAGNOSIS — E11.69 TYPE 2 DIABETES MELLITUS WITH OBESITY (HCC): ICD-10-CM

## 2024-08-12 DIAGNOSIS — E66.01 MORBID OBESITY DUE TO EXCESS CALORIES (HCC): Primary | ICD-10-CM

## 2024-08-12 DIAGNOSIS — F50.9 COMPULSIVE EATING PATTERNS: ICD-10-CM

## 2024-08-12 DIAGNOSIS — Z71.3 DIETARY COUNSELING: ICD-10-CM

## 2024-08-12 DIAGNOSIS — Z71.89 ENCOUNTER FOR PSYCHOLOGICAL ASSESSMENT PRIOR TO BARIATRIC SURGERY: Primary | ICD-10-CM

## 2024-08-12 LAB
25(OH)D3 SERPL-MCNC: 27.7 NG/ML (ref 30–100)
ALBUMIN SERPL-MCNC: 4.4 G/DL (ref 3.5–5.2)
ALP SERPL-CCNC: 94 U/L (ref 40–129)
ALT SERPL-CCNC: 17 U/L (ref 0–40)
ANION GAP SERPL CALCULATED.3IONS-SCNC: 15 MMOL/L (ref 7–16)
AST SERPL-CCNC: 17 U/L (ref 0–39)
BILIRUB SERPL-MCNC: 0.5 MG/DL (ref 0–1.2)
BUN SERPL-MCNC: 16 MG/DL (ref 6–20)
CALCIUM SERPL-MCNC: 9.7 MG/DL (ref 8.6–10.2)
CHLORIDE SERPL-SCNC: 102 MMOL/L (ref 98–107)
CHOLEST SERPL-MCNC: 144 MG/DL
CO2 SERPL-SCNC: 21 MMOL/L (ref 22–29)
CREAT SERPL-MCNC: 1.1 MG/DL (ref 0.7–1.2)
ERYTHROCYTE [DISTWIDTH] IN BLOOD BY AUTOMATED COUNT: 13.6 % (ref 11.5–15)
FERRITIN SERPL-MCNC: 84 NG/ML
FOLATE SERPL-MCNC: 7.6 NG/ML (ref 4.8–24.2)
GFR, ESTIMATED: 81 ML/MIN/1.73M2
GLUCOSE SERPL-MCNC: 160 MG/DL (ref 74–99)
HBA1C MFR BLD: 7.7 % (ref 4–5.6)
HCT VFR BLD AUTO: 46.1 % (ref 37–54)
HDLC SERPL-MCNC: 35 MG/DL
HGB BLD-MCNC: 15.2 G/DL (ref 12.5–16.5)
LDLC SERPL CALC-MCNC: 74 MG/DL
MCH RBC QN AUTO: 30.6 PG (ref 26–35)
MCHC RBC AUTO-ENTMCNC: 33 G/DL (ref 32–34.5)
MCV RBC AUTO: 92.9 FL (ref 80–99.9)
PLATELET # BLD AUTO: 194 K/UL (ref 130–450)
PMV BLD AUTO: 9.4 FL (ref 7–12)
POTASSIUM SERPL-SCNC: 4.3 MMOL/L (ref 3.5–5)
PREALB SERPL-MCNC: 24 MG/DL (ref 20–40)
PROT SERPL-MCNC: 7.5 G/DL (ref 6.4–8.3)
RBC # BLD AUTO: 4.96 M/UL (ref 3.8–5.8)
SODIUM SERPL-SCNC: 138 MMOL/L (ref 132–146)
TRIGL SERPL-MCNC: 175 MG/DL
VIT B12 SERPL-MCNC: 415 PG/ML (ref 211–946)
VLDLC SERPL CALC-MCNC: 35 MG/DL
WBC OTHER # BLD: 7.5 K/UL (ref 4.5–11.5)

## 2024-08-12 PROCEDURE — 80061 LIPID PANEL: CPT

## 2024-08-12 PROCEDURE — 84425 ASSAY OF VITAMIN B-1: CPT

## 2024-08-12 PROCEDURE — 82525 ASSAY OF COPPER: CPT

## 2024-08-12 PROCEDURE — 97802 MEDICAL NUTRITION INDIV IN: CPT | Performed by: DIETITIAN, REGISTERED

## 2024-08-12 PROCEDURE — 82607 VITAMIN B-12: CPT

## 2024-08-12 PROCEDURE — 82306 VITAMIN D 25 HYDROXY: CPT

## 2024-08-12 PROCEDURE — 85027 COMPLETE CBC AUTOMATED: CPT

## 2024-08-12 PROCEDURE — 80053 COMPREHEN METABOLIC PANEL: CPT

## 2024-08-12 PROCEDURE — 82746 ASSAY OF FOLIC ACID SERUM: CPT

## 2024-08-12 PROCEDURE — 84134 ASSAY OF PREALBUMIN: CPT

## 2024-08-12 PROCEDURE — 82728 ASSAY OF FERRITIN: CPT

## 2024-08-12 PROCEDURE — 84590 ASSAY OF VITAMIN A: CPT

## 2024-08-12 PROCEDURE — 83036 HEMOGLOBIN GLYCOSYLATED A1C: CPT

## 2024-08-12 PROCEDURE — 90791 PSYCH DIAGNOSTIC EVALUATION: CPT | Performed by: SOCIAL WORKER

## 2024-08-12 PROCEDURE — 36415 COLL VENOUS BLD VENIPUNCTURE: CPT

## 2024-08-12 PROCEDURE — 84630 ASSAY OF ZINC: CPT

## 2024-08-12 NOTE — PROGRESS NOTES
Diagnostic Evaluation without Medical Services.  Madie Langford is a 59 y.o.  in a long term relationship ,   male, referred by  pulmonary  doctor   for evaluation and treatment.  Patient identify verified by Name and .       Those attending session : patient      Chief Complaint   Patient presents with    Consultation     Evaluation for bariatric surgery.          Motivation for surgery: Motivated for surgery by medical problems CHF sleep apnea COPD, heart disease     Understanding of procedure: The patient has researched the procedure and understands the possibility of potential weight gain., The patient  has talked with other people who have undergone the procedure., and The patient  has not attended a gastric bypas surgery group.    Reported Current weight 373.    Wt Readings from Last 3 Encounters:   24 (!) 169.2 kg (373 lb)   24 (!) 174.6 kg (385 lb)   24 (!) 170.7 kg (376 lb 6.4 oz)       Expectations:   The patient expects to loose 100 lbs following surgery over 12 months.  Goal weight post surgery: 200 lbs.  Other expectations: Improvement in health and Decreased need for medication    HISTORY OF PRESENT ILLNESS       EAT/WEIGHT HISTORY    How old were you when you first became concerned with your weight? 52  Most successful diet in the past? none  Weight lost on the diet listed above? na  Patient stated he / she maintained his / her weight for the following time? na  How much control over your eating do you feel you Have? Many problems with control      Cravings:                  For what types of food: snack foods                  Strategies used to deal with cravings: distracting himself with other things.       Eating habits:  PT reported that he snacks all throughput the day, has dinner and then snacks during the evening as well. He is snacking on snack cakes chips and dip and soda.       Emotional eating: Boredom  \"I think about food a lot\"       BINGE

## 2024-08-12 NOTE — PROGRESS NOTES
healthy - whole wheat bread. Moderate to high intake of empty-calorie foods (sweets, salty / fatty foods).    Yes - Past medically assisted weight loss history reviewed.  Yes - Provided education regarding the basic role of nutrition in junction with Bariatric Surgery.   Yes - Provided overview of required dietary and behavioral changes pre and post operatively.  Yes - Stated / reinforced that changes in dietary behaviors are critical to successful, long term weight Loss.    Patient is aware that in order to proceed with bariatric surgery he / she will need to follow a low-fat diet prior to surgery.  Patient is aware that bariatric surgery is a lifetime change that will require the patient to follow a low-fat, low-calorie, low-sugar, portion controlled diet with at least 30 minutes of physical activity daily.  Patient is aware that certain foods may not be tolerated after bariatric surgery and certain foods will need avoided all together. Pt is aware supplementation of certain micro nutrients is lifelong along with the use of tracking both macro and micro nutrient intake daily after weight loss surgery. Pt is part of a comprehensive surgical weight loss program that is educating the patient on proper food choices, meal planning, portion control and label reading for use both before and after weight loss surgery. Pt is able to set measurable attainable nutrition goals that reinforce desired post-operative eating patterns when it comes to family, home based, or work settings. Mindful eating skills are being developed by the patient and how to identify the patients sense of hunger and fullness are being addressed.      RD / LD feels that this patient knowledge / readiness to make appropriate diet / lifestyle changes assessed to be? - Good    RD / LD feels this patients expected adherence for post surgical diet? - Good    Patient was instructed and signed consents on a low-fat diet and required supplementation at

## 2024-08-12 NOTE — PATIENT INSTRUCTIONS
received your psychological evaluation.       Please be aware that any co-pays or deductibles may be requested prior to testing and / or procedures.     You will need to schedule a psychological evaluation for weight loss surgery.  Patients will be required to complete all psychological testing as required by the psychologist. Patients must follow all of the psychologist's recommendations before weight loss surgery can be scheduled.      The evaluation must be done a standard way for weight loss surgery. We strongly recommend that you contact one of our preferred providers listed below to arrange this:    TOBIN Wiley                          [facility]                                                              [address], [place]                                                                                      [phone]     [facility]         [address], [place]                                                                                                [phone]        Dr. Agustín Rader, PhD                         [address] [place], [place]                                                                                              [phone]     You will also need to plan on attending a 2 hour nutrition class at [facility] prior to your surgery.  We will schedule this for you when we schedule your surgery.     Please remember to have your labs drawn prior to your scheduled appointment to review the results of your testing.     Please remember, that while we will submit your case to insurance for surgery authorization, it is your responsibility to know if your plan covers weight loss surgery and keep up-to-date with changes to your insurance coverage.  We will do everything possible to help you get approved for weight loss surgery, but cannot guarantee an approval.      Please note that you will not be

## 2024-08-13 ENCOUNTER — HOSPITAL ENCOUNTER (OUTPATIENT)
Dept: SLEEP CENTER | Age: 59
Discharge: HOME | End: 2024-08-13
Payer: COMMERCIAL

## 2024-08-13 DIAGNOSIS — G47.33 OSA (OBSTRUCTIVE SLEEP APNEA): ICD-10-CM

## 2024-08-13 LAB
COPPER SERPL-MCNC: 97.3 UG/DL (ref 70–140)
ZINC SERPL-MCNC: 67.5 UG/DL (ref 60–120)

## 2024-08-13 PROCEDURE — 95811 POLYSOM 6/>YRS CPAP 4/> PARM: CPT

## 2024-08-14 VITALS
SYSTOLIC BLOOD PRESSURE: 151 MMHG | DIASTOLIC BLOOD PRESSURE: 94 MMHG | HEIGHT: 72 IN | BODY MASS INDEX: 42.66 KG/M2 | OXYGEN SATURATION: 94 % | WEIGHT: 315 LBS | HEART RATE: 66 BPM

## 2024-08-14 ASSESSMENT — SLEEP AND FATIGUE QUESTIONNAIRES
HOW LIKELY ARE YOU TO NOD OFF OR FALL ASLEEP WHILE SITTING INACTIVE IN A PUBLIC PLACE: SLIGHT CHANCE OF DOZING
HOW LIKELY ARE YOU TO NOD OFF OR FALL ASLEEP WHEN YOU ARE A PASSENGER IN A CAR FOR AN HOUR WITHOUT A BREAK: SLIGHT CHANCE OF DOZING
ESS TOTAL SCORE: 13
HOW LIKELY ARE YOU TO NOD OFF OR FALL ASLEEP IN A CAR, WHILE STOPPED FOR A FEW MINUTES IN TRAFFIC: SLIGHT CHANCE OF DOZING
HOW LIKELY ARE YOU TO NOD OFF OR FALL ASLEEP WHILE SITTING AND TALKING TO SOMEONE: SLIGHT CHANCE OF DOZING
HOW LIKELY ARE YOU TO NOD OFF OR FALL ASLEEP WHILE SITTING AND READING: HIGH CHANCE OF DOZING
HOW LIKELY ARE YOU TO NOD OFF OR FALL ASLEEP WHILE LYING DOWN TO REST IN THE AFTERNOON WHEN CIRCUMSTANCES PERMIT: MODERATE CHANCE OF DOZING
HOW LIKELY ARE YOU TO NOD OFF OR FALL ASLEEP WHILE WATCHING TV: HIGH CHANCE OF DOZING
HOW LIKELY ARE YOU TO NOD OFF OR FALL ASLEEP WHILE SITTING QUIETLY AFTER LUNCH WITHOUT ALCOHOL: SLIGHT CHANCE OF DOZING

## 2024-08-15 LAB
RETINYL PALMITATE: <0.02 MG/L (ref 0–0.1)
VITAMIN A LEVEL: 0.74 MG/L (ref 0.3–1.2)
VITAMIN A, INTERP: NORMAL

## 2024-08-16 LAB — VIT B1 PYROPHOSHATE BLD-SCNC: 144 NMOL/L (ref 70–180)

## 2024-08-27 ENCOUNTER — HOSPITAL ENCOUNTER (OUTPATIENT)
Dept: PULMONOLOGY | Age: 59
Discharge: HOME | End: 2024-08-27
Attending: INTERNAL MEDICINE
Payer: COMMERCIAL

## 2024-08-27 ENCOUNTER — HOSPITAL ENCOUNTER (OUTPATIENT)
Dept: CT IMAGING | Age: 59
Discharge: HOME | End: 2024-08-29
Attending: INTERNAL MEDICINE
Payer: COMMERCIAL

## 2024-08-27 DIAGNOSIS — J44.9 CHRONIC OBSTRUCTIVE PULMONARY DISEASE, UNSPECIFIED COPD TYPE (HCC): ICD-10-CM

## 2024-08-27 DIAGNOSIS — Z12.2 SCREENING FOR LUNG CANCER: ICD-10-CM

## 2024-08-27 PROCEDURE — 94726 PLETHYSMOGRAPHY LUNG VOLUMES: CPT

## 2024-08-27 PROCEDURE — 94060 EVALUATION OF WHEEZING: CPT

## 2024-08-27 PROCEDURE — 94729 DIFFUSING CAPACITY: CPT

## 2024-08-27 NOTE — PROGRESS NOTES
Pt not eligible for Lung Screen at this time.  Pt had a CTA Chest on 7/6/24, so can have the lung screen on 7/6/25 or after.  Office notified.

## 2024-09-16 ENCOUNTER — TELEPHONE (OUTPATIENT)
Dept: BARIATRICS/WEIGHT MGMT | Age: 59
End: 2024-09-16

## 2024-10-14 ENCOUNTER — TELEPHONE (OUTPATIENT)
Dept: BARIATRICS/WEIGHT MGMT | Age: 59
End: 2024-10-14

## 2024-10-14 NOTE — TELEPHONE ENCOUNTER
SW called PT regarding  psych eval appointment on 10-21 but PT was unavailable.  Left v-mail explaining that I will be working out of the office and would like to change appointment to a VV MyChart visit, requested that the PT call to agreed to VV or to reschedule the appointment.     PHANI Truong

## 2024-10-16 ENCOUNTER — TELEPHONE (OUTPATIENT)
Dept: BARIATRICS/WEIGHT MGMT | Age: 59
End: 2024-10-16

## 2024-10-16 NOTE — TELEPHONE ENCOUNTER
SW called pt and informed him that his appointment has been canceled ad will need to be reschedule as SW will be working out of office that day.  Requested that PT call back to reschedule.     PHANI Truong

## 2024-11-12 ENCOUNTER — TELEPHONE (OUTPATIENT)
Dept: BARIATRICS/WEIGHT MGMT | Age: 59
End: 2024-11-12

## 2024-11-12 NOTE — TELEPHONE ENCOUNTER
SW called PT regarding missed psych eval appointment but PT was unavailable.  Left v-mail requesting that the PT call to reschedule the appointment.     PHANI Truong

## 2024-11-13 ENCOUNTER — HOSPITAL ENCOUNTER (EMERGENCY)
Age: 59
Discharge: HOME | End: 2024-11-14
Attending: EMERGENCY MEDICINE
Payer: COMMERCIAL

## 2024-11-13 DIAGNOSIS — S39.012A STRAIN OF LUMBAR REGION, INITIAL ENCOUNTER: Primary | ICD-10-CM

## 2024-11-13 PROCEDURE — 6360000002 HC RX W HCPCS

## 2024-11-13 PROCEDURE — 99284 EMERGENCY DEPT VISIT MOD MDM: CPT

## 2024-11-13 PROCEDURE — 96375 TX/PRO/DX INJ NEW DRUG ADDON: CPT

## 2024-11-13 PROCEDURE — 6370000000 HC RX 637 (ALT 250 FOR IP)

## 2024-11-13 PROCEDURE — 96374 THER/PROPH/DIAG INJ IV PUSH: CPT

## 2024-11-13 RX ORDER — KETOROLAC TROMETHAMINE 30 MG/ML
15 INJECTION, SOLUTION INTRAMUSCULAR; INTRAVENOUS ONCE
Status: COMPLETED | OUTPATIENT
Start: 2024-11-13 | End: 2024-11-13

## 2024-11-13 RX ORDER — ORPHENADRINE CITRATE 30 MG/ML
60 INJECTION INTRAMUSCULAR; INTRAVENOUS ONCE
Status: COMPLETED | OUTPATIENT
Start: 2024-11-13 | End: 2024-11-13

## 2024-11-13 RX ORDER — DEXAMETHASONE SODIUM PHOSPHATE 10 MG/ML
10 INJECTION INTRAMUSCULAR; INTRAVENOUS ONCE
Status: COMPLETED | OUTPATIENT
Start: 2024-11-13 | End: 2024-11-13

## 2024-11-13 RX ORDER — LIDOCAINE 4 G/G
1 PATCH TOPICAL DAILY
Status: DISCONTINUED | OUTPATIENT
Start: 2024-11-13 | End: 2024-11-14 | Stop reason: HOSPADM

## 2024-11-13 RX ADMIN — ORPHENADRINE CITRATE 60 MG: 30 INJECTION, SOLUTION INTRAMUSCULAR; INTRAVENOUS at 23:39

## 2024-11-13 RX ADMIN — DEXAMETHASONE SODIUM PHOSPHATE 10 MG: 10 INJECTION INTRAMUSCULAR; INTRAVENOUS at 23:39

## 2024-11-13 RX ADMIN — KETOROLAC TROMETHAMINE 15 MG: 30 INJECTION, SOLUTION INTRAMUSCULAR at 23:39

## 2024-11-13 ASSESSMENT — PAIN SCALES - GENERAL
PAINLEVEL_OUTOF10: 10
PAINLEVEL_OUTOF10: 10

## 2024-11-13 ASSESSMENT — PAIN DESCRIPTION - LOCATION
LOCATION: BACK
LOCATION: BACK;LEG

## 2024-11-13 ASSESSMENT — PAIN - FUNCTIONAL ASSESSMENT
PAIN_FUNCTIONAL_ASSESSMENT: PREVENTS OR INTERFERES WITH MANY ACTIVE NOT PASSIVE ACTIVITIES
PAIN_FUNCTIONAL_ASSESSMENT: 0-10

## 2024-11-13 ASSESSMENT — PAIN DESCRIPTION - ORIENTATION: ORIENTATION: MID;RIGHT

## 2024-11-14 ENCOUNTER — TELEPHONE (OUTPATIENT)
Dept: BARIATRICS/WEIGHT MGMT | Age: 59
End: 2024-11-14

## 2024-11-14 ENCOUNTER — APPOINTMENT (OUTPATIENT)
Dept: CT IMAGING | Age: 59
End: 2024-11-14
Payer: COMMERCIAL

## 2024-11-14 VITALS
RESPIRATION RATE: 16 BRPM | SYSTOLIC BLOOD PRESSURE: 150 MMHG | OXYGEN SATURATION: 94 % | DIASTOLIC BLOOD PRESSURE: 92 MMHG | HEART RATE: 75 BPM | TEMPERATURE: 98.1 F

## 2024-11-14 PROCEDURE — 96375 TX/PRO/DX INJ NEW DRUG ADDON: CPT

## 2024-11-14 PROCEDURE — 6360000002 HC RX W HCPCS

## 2024-11-14 PROCEDURE — 72131 CT LUMBAR SPINE W/O DYE: CPT

## 2024-11-14 RX ORDER — MORPHINE SULFATE 4 MG/ML
8 INJECTION, SOLUTION INTRAMUSCULAR; INTRAVENOUS ONCE
Status: COMPLETED | OUTPATIENT
Start: 2024-11-14 | End: 2024-11-14

## 2024-11-14 RX ORDER — CYCLOBENZAPRINE HCL 10 MG
10 TABLET ORAL 3 TIMES DAILY PRN
Qty: 21 TABLET | Refills: 0 | Status: SHIPPED | OUTPATIENT
Start: 2024-11-14 | End: 2024-11-24

## 2024-11-14 RX ORDER — FENTANYL CITRATE 50 UG/ML
50 INJECTION, SOLUTION INTRAMUSCULAR; INTRAVENOUS ONCE
Status: COMPLETED | OUTPATIENT
Start: 2024-11-14 | End: 2024-11-14

## 2024-11-14 RX ADMIN — MORPHINE SULFATE 8 MG: 4 INJECTION, SOLUTION INTRAMUSCULAR; INTRAVENOUS at 03:31

## 2024-11-14 RX ADMIN — FENTANYL CITRATE 50 MCG: 50 INJECTION INTRAMUSCULAR; INTRAVENOUS at 01:22

## 2024-11-14 ASSESSMENT — PAIN DESCRIPTION - DESCRIPTORS
DESCRIPTORS: ACHING;DULL;SHARP
DESCRIPTORS: ACHING;SHARP;DULL
DESCRIPTORS: SHARP;DULL;ACHING

## 2024-11-14 ASSESSMENT — PAIN - FUNCTIONAL ASSESSMENT
PAIN_FUNCTIONAL_ASSESSMENT: PREVENTS OR INTERFERES WITH MANY ACTIVE NOT PASSIVE ACTIVITIES
PAIN_FUNCTIONAL_ASSESSMENT: PREVENTS OR INTERFERES WITH ALL ACTIVE AND SOME PASSIVE ACTIVITIES
PAIN_FUNCTIONAL_ASSESSMENT: PREVENTS OR INTERFERES WITH MANY ACTIVE NOT PASSIVE ACTIVITIES

## 2024-11-14 ASSESSMENT — PAIN DESCRIPTION - LOCATION
LOCATION: BACK;LEG

## 2024-11-14 ASSESSMENT — PAIN SCALES - GENERAL
PAINLEVEL_OUTOF10: 8
PAINLEVEL_OUTOF10: 7
PAINLEVEL_OUTOF10: 7

## 2024-11-14 ASSESSMENT — PAIN DESCRIPTION - PAIN TYPE: TYPE: ACUTE PAIN

## 2024-11-14 ASSESSMENT — PAIN DESCRIPTION - ORIENTATION
ORIENTATION: MID;RIGHT

## 2024-11-14 ASSESSMENT — PAIN DESCRIPTION - ONSET: ONSET: ON-GOING

## 2024-11-14 ASSESSMENT — PAIN DESCRIPTION - FREQUENCY: FREQUENCY: CONTINUOUS

## 2024-11-14 NOTE — TELEPHONE ENCOUNTER
SW called PT regarding  psych eval appointment but PT was unavailable.  Left v-mail requesting that the PT call to reschedule the appointment.     PHANI Truong

## 2024-11-14 NOTE — DISCHARGE INSTRUCTIONS
Please call and schedule appoint with your primary care doctor.  Please return to the ER if her symptoms should worsen.

## 2024-11-14 NOTE — ED PROVIDER NOTES
improvement.  He was subsequently given morphine with improvement of his pain.  I reviewed the patient's labs and imaging results with him.  He was able to ambulate.  I believe the patient is appropriate for discharge with follow-up with his primary care doctor.  Told to return to the ER if his symptoms should worsen or if he should develop any red flag symptoms including urinary incontinence, saddle anesthesia, fevers.  He expressed understanding was agreeable with this plan.  I we will prescribe him a muscle relaxer.      FINAL IMPRESSION      1. Strain of lumbar region, initial encounter          DISPOSITION/PLAN     DISPOSITION Decision To Discharge 11/14/2024 04:32:15 AM    DISPOSITION  Disposition: Discharge to home  Patient condition is good    11/13/24, 11:08 PM EST.    Jeanmarie Morales DO  Emergency Medicine    PATIENT REFERRED TO:  Josse Pompa MD  [address] [id number] [phone]    Schedule an appointment as soon as possible for a visit         DISCHARGE MEDICATIONS:  New Prescriptions    CYCLOBENZAPRINE (FLEXERIL) 10 MG TABLET    Take 1 tablet by mouth 3 times daily as needed for Muscle spasms       DISCONTINUED MEDICATIONS:  Discontinued Medications    No medications on file              (Please note that portions of this note were completed with a voice recognition program.  Efforts were made to edit the dictations but occasionally words are mis-transcribed.)    Jeanmarie Morales DO (electronically signed)

## 2024-11-19 ENCOUNTER — SCHEDULED TELEPHONE ENCOUNTER (OUTPATIENT)
Dept: PULMONOLOGY | Age: 59
End: 2024-11-19

## 2024-11-19 DIAGNOSIS — J42 CHRONIC BRONCHITIS, UNSPECIFIED CHRONIC BRONCHITIS TYPE (HCC): Primary | ICD-10-CM

## 2024-11-19 RX ORDER — ARFORMOTEROL TARTRATE 15 UG/2ML
1 SOLUTION RESPIRATORY (INHALATION)
Qty: 120 ML | Refills: 12 | Status: SHIPPED | OUTPATIENT
Start: 2024-11-19 | End: 2024-12-19

## 2024-11-19 RX ORDER — BUDESONIDE 0.5 MG/2ML
500 INHALANT ORAL 2 TIMES DAILY
Qty: 60 EACH | Refills: 3 | Status: SHIPPED | OUTPATIENT
Start: 2024-11-19

## 2024-11-19 NOTE — PROGRESS NOTES
PULMONARY  MEDICINE OFFICE FOLLOW UP       Patient was seen today via Telehealth by agreement and consent. I used the following Telehealth technology: Audio capability only. This patient encounter is appropriate and reasonable under the circumstances given the patient's particular presentation at this time. The patient has been advised of the potential risks and limitations of this mode of treatment (including but not limited to the absence of in-person examination) and has agreed to be treated in a remote fashion in spite of them. Any and all of the patient's/patient's family's questions on this issue have been answered and I have made no promises or guarantees to the patient. The patient has also been advised to contact this office for worsening conditions or problems, and seek emergency medical treatment and/or call 911 if the patient deems either necessary.     Patient identification was verified at the start of the visit: Yes     Total time spent on this encounter: 22 minutes        Madie Langford is a 59 y.o. with PMH of heart failure, type 2 diabetes, insulin requiring, HTN, COPD (no abnormal PFT on file), CAD s/p stent placement, CVA x 3 without residual deficit who is being followed here for -         Chronic  Hypercapnic and hypoxic respiratory insufficiency  - this is secondary to  multifactorial etiologies-   OHS/ JARRET with pickwickian physiology + Cor pulmonale + chronic Pulmonary vascular congestion , HFpEF - grade II + probable  Pulmonary HTN( PA systolic in ECHO- ? WHO Group II + III, NYHA class II) + mediastinal lipomatosis causing compressive atelectasis.     Since the last visit he hurt his back during a dear hunting event . He reported to ED , he was told that his sciatica flared up and he is still getting back to baseline from it . He has outpatient follow up for the same.     Has bariatric surgery coming up . Following with the [facility]     His baseline symptomatology have not changed -

## 2024-11-19 NOTE — PROGRESS NOTES
Documentation:  I communicated with the patient and/or health care decision maker about Follow up.   Details of this discussion including any medical advice provided:     Total Time: minutes: 11-20 minutes    Madie Langford was evaluated through a synchronous (real-time) audio encounter. Patient identification was verified at the start of the visit. He (or guardian if applicable) is aware that this is a billable service, which includes applicable co-pays. This visit was conducted with the patient's (and/or legal guardian's) verbal consent. He has not had a related appointment within my department in the past 7 days or scheduled within the next 24 hours.   The patient was located at Home: [address] [place] [address].  The provider was located at Facility (Appt Dept): [address] [place] [address].  Confirm you are appropriately licensed, registered, or certified to deliver care in the state where the patient is located as indicated above. If you are not or unsure, please re-schedule the visit: Yes, I confirm.     Note: not billable if this call serves to triage the patient into an appointment for the relevant concern    Madie Langford is a 59 y.o. male evaluated via telephone on 11/19/2024 for No chief complaint on file.  .        Jacqueline Marie RN

## 2025-06-01 ENCOUNTER — APPOINTMENT (OUTPATIENT)
Dept: GENERAL RADIOLOGY | Age: 60
End: 2025-06-01
Payer: COMMERCIAL

## 2025-06-01 ENCOUNTER — HOSPITAL ENCOUNTER (EMERGENCY)
Age: 60
Discharge: LEFT BEFORE DISCHARGE | End: 2025-06-01
Attending: STUDENT IN AN ORGANIZED HEALTH CARE EDUCATION/TRAINING PROGRAM
Payer: COMMERCIAL

## 2025-06-01 VITALS
WEIGHT: 315 LBS | RESPIRATION RATE: 20 BRPM | OXYGEN SATURATION: 95 % | TEMPERATURE: 98.7 F | HEIGHT: 72 IN | HEART RATE: 90 BPM | DIASTOLIC BLOOD PRESSURE: 88 MMHG | BODY MASS INDEX: 42.66 KG/M2 | SYSTOLIC BLOOD PRESSURE: 127 MMHG

## 2025-06-01 DIAGNOSIS — J44.1 COPD EXACERBATION (HCC): ICD-10-CM

## 2025-06-01 DIAGNOSIS — B34.8 RHINOVIRUS INFECTION: ICD-10-CM

## 2025-06-01 DIAGNOSIS — R06.09 DYSPNEA ON EXERTION: Primary | ICD-10-CM

## 2025-06-01 LAB
ALBUMIN SERPL-MCNC: 4 G/DL (ref 3.5–5.2)
ALP SERPL-CCNC: 83 U/L (ref 40–129)
ALT SERPL-CCNC: 13 U/L (ref 0–50)
ANION GAP SERPL CALCULATED.3IONS-SCNC: 14 MMOL/L (ref 7–16)
AST SERPL-CCNC: 31 U/L (ref 0–50)
B PARAP IS1001 DNA NPH QL NAA+NON-PROBE: NOT DETECTED
B PERT DNA SPEC QL NAA+PROBE: NOT DETECTED
BASOPHILS # BLD: 0.04 K/UL (ref 0–0.2)
BASOPHILS NFR BLD: 1 % (ref 0–2)
BILIRUB SERPL-MCNC: 0.5 MG/DL (ref 0–1.2)
BNP SERPL-MCNC: 903 PG/ML (ref 0–125)
BUN SERPL-MCNC: 17 MG/DL (ref 6–20)
C PNEUM DNA NPH QL NAA+NON-PROBE: NOT DETECTED
CALCIUM SERPL-MCNC: 9.3 MG/DL (ref 8.6–10)
CHLORIDE SERPL-SCNC: 102 MMOL/L (ref 98–107)
CO2 SERPL-SCNC: 22 MMOL/L (ref 22–29)
CREAT SERPL-MCNC: 1.2 MG/DL (ref 0.7–1.2)
D-DIMER QUANTITATIVE: <200 NG/ML DDU (ref 0–230)
EKG ATRIAL RATE: 90 BPM
EKG P AXIS: 33 DEGREES
EKG P-R INTERVAL: 180 MS
EKG Q-T INTERVAL: 370 MS
EKG QRS DURATION: 90 MS
EKG QTC CALCULATION (BAZETT): 452 MS
EKG R AXIS: -25 DEGREES
EKG T AXIS: 122 DEGREES
EKG VENTRICULAR RATE: 90 BPM
EOSINOPHIL # BLD: 0.11 K/UL (ref 0.05–0.5)
EOSINOPHILS RELATIVE PERCENT: 1 % (ref 0–6)
ERYTHROCYTE [DISTWIDTH] IN BLOOD BY AUTOMATED COUNT: 13.8 % (ref 11.5–15)
FLUAV RNA NPH QL NAA+NON-PROBE: NOT DETECTED
FLUBV RNA NPH QL NAA+NON-PROBE: NOT DETECTED
GFR, ESTIMATED: 72 ML/MIN/1.73M2
GLUCOSE SERPL-MCNC: 170 MG/DL (ref 74–99)
HADV DNA NPH QL NAA+NON-PROBE: NOT DETECTED
HCOV 229E RNA NPH QL NAA+NON-PROBE: NOT DETECTED
HCOV HKU1 RNA NPH QL NAA+NON-PROBE: NOT DETECTED
HCOV NL63 RNA NPH QL NAA+NON-PROBE: NOT DETECTED
HCOV OC43 RNA NPH QL NAA+NON-PROBE: NOT DETECTED
HCT VFR BLD AUTO: 44.6 % (ref 37–54)
HGB BLD-MCNC: 14.7 G/DL (ref 12.5–16.5)
HMPV RNA NPH QL NAA+NON-PROBE: NOT DETECTED
HPIV1 RNA NPH QL NAA+NON-PROBE: NOT DETECTED
HPIV2 RNA NPH QL NAA+NON-PROBE: NOT DETECTED
HPIV3 RNA NPH QL NAA+NON-PROBE: NOT DETECTED
HPIV4 RNA NPH QL NAA+NON-PROBE: NOT DETECTED
IMM GRANULOCYTES # BLD AUTO: 0.03 K/UL (ref 0–0.58)
IMM GRANULOCYTES NFR BLD: 0 % (ref 0–5)
LYMPHOCYTES NFR BLD: 0.73 K/UL (ref 1.5–4)
LYMPHOCYTES RELATIVE PERCENT: 8 % (ref 20–42)
M PNEUMO DNA NPH QL NAA+NON-PROBE: NOT DETECTED
MCH RBC QN AUTO: 31.1 PG (ref 26–35)
MCHC RBC AUTO-ENTMCNC: 33 G/DL (ref 32–34.5)
MCV RBC AUTO: 94.5 FL (ref 80–99.9)
MONOCYTES NFR BLD: 0.53 K/UL (ref 0.1–0.95)
MONOCYTES NFR BLD: 6 % (ref 2–12)
NEUTROPHILS NFR BLD: 83 % (ref 43–80)
NEUTS SEG NFR BLD: 7.2 K/UL (ref 1.8–7.3)
PLATELET # BLD AUTO: 175 K/UL (ref 130–450)
PMV BLD AUTO: 9.3 FL (ref 7–12)
POTASSIUM SERPL-SCNC: 4.3 MMOL/L (ref 3.5–5.1)
PROT SERPL-MCNC: 7.2 G/DL (ref 6.4–8.3)
RBC # BLD AUTO: 4.72 M/UL (ref 3.8–5.8)
RSV RNA NPH QL NAA+NON-PROBE: NOT DETECTED
RV+EV RNA NPH QL NAA+NON-PROBE: DETECTED
SARS-COV-2 RNA NPH QL NAA+NON-PROBE: NOT DETECTED
SODIUM SERPL-SCNC: 138 MMOL/L (ref 136–145)
SPECIMEN DESCRIPTION: ABNORMAL
TROPONIN I SERPL HS-MCNC: 37 NG/L (ref 0–22)
TROPONIN I SERPL HS-MCNC: 38 NG/L (ref 0–22)
WBC OTHER # BLD: 8.6 K/UL (ref 4.5–11.5)

## 2025-06-01 PROCEDURE — 6370000000 HC RX 637 (ALT 250 FOR IP): Performed by: STUDENT IN AN ORGANIZED HEALTH CARE EDUCATION/TRAINING PROGRAM

## 2025-06-01 PROCEDURE — 85379 FIBRIN DEGRADATION QUANT: CPT

## 2025-06-01 PROCEDURE — 93010 ELECTROCARDIOGRAM REPORT: CPT | Performed by: INTERNAL MEDICINE

## 2025-06-01 PROCEDURE — 85025 COMPLETE CBC W/AUTO DIFF WBC: CPT

## 2025-06-01 PROCEDURE — 80053 COMPREHEN METABOLIC PANEL: CPT

## 2025-06-01 PROCEDURE — 99285 EMERGENCY DEPT VISIT HI MDM: CPT

## 2025-06-01 PROCEDURE — 94640 AIRWAY INHALATION TREATMENT: CPT

## 2025-06-01 PROCEDURE — 83880 ASSAY OF NATRIURETIC PEPTIDE: CPT

## 2025-06-01 PROCEDURE — 93005 ELECTROCARDIOGRAM TRACING: CPT

## 2025-06-01 PROCEDURE — 36415 COLL VENOUS BLD VENIPUNCTURE: CPT

## 2025-06-01 PROCEDURE — 0202U NFCT DS 22 TRGT SARS-COV-2: CPT

## 2025-06-01 PROCEDURE — 84484 ASSAY OF TROPONIN QUANT: CPT

## 2025-06-01 PROCEDURE — 71045 X-RAY EXAM CHEST 1 VIEW: CPT

## 2025-06-01 PROCEDURE — 6370000000 HC RX 637 (ALT 250 FOR IP)

## 2025-06-01 RX ORDER — ACETAMINOPHEN 500 MG
1000 TABLET ORAL ONCE
Status: COMPLETED | OUTPATIENT
Start: 2025-06-01 | End: 2025-06-01

## 2025-06-01 RX ORDER — HYDROCODONE BITARTRATE AND ACETAMINOPHEN 5; 325 MG/1; MG/1
1 TABLET ORAL ONCE
Status: COMPLETED | OUTPATIENT
Start: 2025-06-01 | End: 2025-06-01

## 2025-06-01 RX ORDER — IPRATROPIUM BROMIDE AND ALBUTEROL SULFATE 2.5; .5 MG/3ML; MG/3ML
1 SOLUTION RESPIRATORY (INHALATION) ONCE
Status: COMPLETED | OUTPATIENT
Start: 2025-06-01 | End: 2025-06-01

## 2025-06-01 RX ADMIN — HYDROCODONE BITARTRATE AND ACETAMINOPHEN 1 TABLET: 5; 325 TABLET ORAL at 10:18

## 2025-06-01 RX ADMIN — IPRATROPIUM BROMIDE AND ALBUTEROL SULFATE 1 DOSE: 2.5; .5 SOLUTION RESPIRATORY (INHALATION) at 05:20

## 2025-06-01 RX ADMIN — ACETAMINOPHEN 1000 MG: 500 TABLET ORAL at 05:05

## 2025-06-01 ASSESSMENT — PAIN DESCRIPTION - PAIN TYPE: TYPE: ACUTE PAIN

## 2025-06-01 ASSESSMENT — PAIN DESCRIPTION - FREQUENCY: FREQUENCY: CONTINUOUS

## 2025-06-01 ASSESSMENT — PAIN - FUNCTIONAL ASSESSMENT
PAIN_FUNCTIONAL_ASSESSMENT: 0-10
PAIN_FUNCTIONAL_ASSESSMENT: ACTIVITIES ARE NOT PREVENTED

## 2025-06-01 ASSESSMENT — LIFESTYLE VARIABLES
HOW MANY STANDARD DRINKS CONTAINING ALCOHOL DO YOU HAVE ON A TYPICAL DAY: PATIENT DOES NOT DRINK
HOW OFTEN DO YOU HAVE A DRINK CONTAINING ALCOHOL: NEVER

## 2025-06-01 ASSESSMENT — PAIN SCALES - GENERAL
PAINLEVEL_OUTOF10: 6
PAINLEVEL_OUTOF10: 9

## 2025-06-01 ASSESSMENT — PAIN DESCRIPTION - DESCRIPTORS: DESCRIPTORS: ACHING;BURNING;PRESSURE

## 2025-06-01 ASSESSMENT — PAIN DESCRIPTION - LOCATION: LOCATION: CHEST

## 2025-06-01 ASSESSMENT — PAIN DESCRIPTION - ORIENTATION: ORIENTATION: MID

## 2025-06-01 NOTE — ED NOTES
Pt A&O x 4. Pt requesting to leave AMA. Dr. Man notified. AMA form signed by Pt and witnessed by this RN. Risks in leaving hospital and Benefits with staying within the hospital reviewed.

## 2025-06-01 NOTE — ED PROVIDER NOTES
Vitals:    06/01/25 1000 06/01/25 1015 06/01/25 1030 06/01/25 1100   BP: 136/88   127/88   Pulse: 83 82 82 90   Resp: 27 19 17 20   Temp:       TempSrc:       SpO2: 94% 96% 92% 95%   Weight:       Height:           Patient was given the following medications:  Medications   acetaminophen (TYLENOL) tablet 1,000 mg (1,000 mg Oral Given 6/1/25 0505)   ipratropium 0.5 mg-albuterol 2.5 mg (DUONEB) nebulizer solution 1 Dose (1 Dose Inhalation Given 6/1/25 0520)   HYDROcodone-acetaminophen (NORCO) 5-325 MG per tablet 1 tablet (1 tablet Oral Given 6/1/25 1018)         No   Exclusion criteria - the patient is NOT to be included for SEP-1 Core Measure due to:  Viral etiology found or highly suspected (including COVID-19) without concomitant bacterial infection        Medical Decision Making/Differential Diagnosis:    CC/HPI Summary, Social Determinants of health, Records Reviewed, DDx, testing done/not done, ED Course, Reassessment, disposition considerations/shared decision making with patient, consults, disposition:      ED Course as of 06/02/25 0737   Sun Jun 01, 2025   0432 EKG shows 90 bpm.  No acute ST elevations or depressions.  QTc normal.  Interpreted by me.  Similar to prior. [FG]   1100 Dr. Cotter will admit the patient. [BG]      ED Course User Index  [BG] Taqueria Brown MD  [FG] Jah Solis DO      He is a 60-year-old male with a history of HTN, HLD, MI with 3 stents, COPD, CHF, CVA presenting for shortness of breath and dyspnea on exertion starting yesterday worsening this morning with associated chest pressure that is midsternal and radiating bilaterally as well as nausea.  He is not on oxygen at baseline was but was satting 89 to 90% on room air and was placed on oxygen by EMS with improvement.  Differentials include but not limited to ACS, pneumonia, PE, COPD exacerbation, CHF exacerbation, viral illness, electrolyte abnormalities pericardial effusion, pleural effusion.  Patient's initial

## (undated) DEVICE — GUARD TEETH AD PR NYL

## (undated) DEVICE — SET SINUS SCOPE

## (undated) DEVICE — GLOVE SURG SZ 75 L12IN FNGR THK94MIL TRNSLUC YEL LTX

## (undated) DEVICE — SOLUTION IV IRRIG POUR BRL 0.9% SODIUM CHL 2F7124

## (undated) DEVICE — SURGICAL PROCEDURE PACK EENT CUST

## (undated) DEVICE — GENERAL SCOPES AND LIGHT CORD 5 AND 10MM

## (undated) DEVICE — HOLDERS JAKO

## (undated) DEVICE — MARKER,SKIN,WI/RULER AND LABELS: Brand: MEDLINE

## (undated) DEVICE — TOWEL,OR,DSP,ST,BLUE,STD,6/PK,12PK/CS: Brand: MEDLINE

## (undated) DEVICE — 4-PORT MANIFOLD: Brand: NEPTUNE 2

## (undated) DEVICE — BASIC SINGLE BASIN 1-LF: Brand: MEDLINE INDUSTRIES, INC.

## (undated) DEVICE — SET FORCEP MICROFRANCE LARYNGEAL

## (undated) DEVICE — STANDARD HYPODERMIC NEEDLE,ALUMINUM HUB: Brand: MONOJECT

## (undated) DEVICE — 1.5L THIN WALL CAN: Brand: CRD

## (undated) DEVICE — KIT,ANTI FOG,W/SPONGE & FLUID,SOFT PACK: Brand: MEDLINE

## (undated) DEVICE — CAMERA STRYKER 1488 HD GEN

## (undated) DEVICE — DOUBLE BASIN SET: Brand: MEDLINE INDUSTRIES, INC.

## (undated) DEVICE — STANDARD HYPODERMIC NEEDLE,POLYPROPYLENE HUB: Brand: MONOJECT

## (undated) DEVICE — ADAPTER TBNG DIA15MM SWVL FBROPT BRONCHSCP TERM 2 AXIS PEEP

## (undated) DEVICE — CODMAN® SURGICAL PATTIES 1/2" X 1/2" (1.27CM X 1.27CM): Brand: CODMAN®

## (undated) DEVICE — GOWN,SIRUS,FABRNF,L,20/CS: Brand: MEDLINE

## (undated) DEVICE — FILTER PAPER CASSETTE BIOP PLSTC PNK HNGD